# Patient Record
Sex: MALE | Race: BLACK OR AFRICAN AMERICAN | NOT HISPANIC OR LATINO | Employment: OTHER | URBAN - NONMETROPOLITAN AREA
[De-identification: names, ages, dates, MRNs, and addresses within clinical notes are randomized per-mention and may not be internally consistent; named-entity substitution may affect disease eponyms.]

---

## 2018-02-01 ENCOUNTER — TELEPHONE (OUTPATIENT)
Dept: NEUROLOGY | Facility: CLINIC | Age: 63
End: 2018-02-01

## 2018-02-01 NOTE — TELEPHONE ENCOUNTER
----- Message from Moises Estevez LPN sent at 2/1/2018 12:50 PM CST -----  Regarding: RE: MRI Denied  No, no phone number.   ----- Message -----     From: Cydney Hahn MA     Sent: 2/1/2018  12:40 PM       To: Moises Estevez LPN  Subject: RE: MRI Denied                                   DID HE LEAVE A PHONE NUMBER? ONES IN HIS CHART ARE NOT WORKING AND HE HAS NOT BEEN SEEN HERE BEFORE.  ----- Message -----     From: Moises Estevez LPN     Sent: 2/1/2018  11:44 AM       To: Cydney Hahn MA  Subject: MRI Denied                                       His insurance denied his MRI. What does he do next? Please call him.

## 2020-09-01 ENCOUNTER — TRANSCRIBE ORDERS (OUTPATIENT)
Dept: ORTHOPEDIC SURGERY | Facility: CLINIC | Age: 65
End: 2020-09-01

## 2020-09-01 DIAGNOSIS — M25.562 ACUTE BILATERAL KNEE PAIN: Primary | ICD-10-CM

## 2020-09-01 DIAGNOSIS — M25.561 ACUTE BILATERAL KNEE PAIN: Primary | ICD-10-CM

## 2020-09-09 DIAGNOSIS — M25.562 ACUTE PAIN OF BOTH KNEES: Primary | ICD-10-CM

## 2020-09-09 DIAGNOSIS — M25.561 ACUTE PAIN OF BOTH KNEES: Primary | ICD-10-CM

## 2020-09-10 ENCOUNTER — OFFICE VISIT (OUTPATIENT)
Dept: ORTHOPEDIC SURGERY | Facility: CLINIC | Age: 65
End: 2020-09-10

## 2020-09-10 VITALS — HEIGHT: 72 IN | BODY MASS INDEX: 35.76 KG/M2 | WEIGHT: 264 LBS

## 2020-09-10 DIAGNOSIS — E11.9 TYPE 2 DIABETES MELLITUS WITHOUT COMPLICATION, WITHOUT LONG-TERM CURRENT USE OF INSULIN (HCC): ICD-10-CM

## 2020-09-10 DIAGNOSIS — M23.92 INTERNAL DERANGEMENT OF LEFT KNEE: Primary | ICD-10-CM

## 2020-09-10 DIAGNOSIS — M25.561 ACUTE PAIN OF BOTH KNEES: ICD-10-CM

## 2020-09-10 DIAGNOSIS — Z96.651 S/P TKR (TOTAL KNEE REPLACEMENT), RIGHT: ICD-10-CM

## 2020-09-10 DIAGNOSIS — M25.562 ACUTE PAIN OF BOTH KNEES: ICD-10-CM

## 2020-09-10 PROCEDURE — 99203 OFFICE O/P NEW LOW 30 MIN: CPT | Performed by: ORTHOPAEDIC SURGERY

## 2020-09-10 RX ORDER — TAMSULOSIN HYDROCHLORIDE 0.4 MG/1
0.4 CAPSULE ORAL DAILY
Status: ON HOLD | COMMUNITY
Start: 2020-08-27 | End: 2022-01-29 | Stop reason: SDUPTHER

## 2020-09-10 RX ORDER — FUROSEMIDE 20 MG/1
20 TABLET ORAL EVERY OTHER DAY
COMMUNITY
Start: 2020-08-27 | End: 2022-01-29 | Stop reason: HOSPADM

## 2020-09-10 RX ORDER — LOVASTATIN 10 MG/1
TABLET ORAL
COMMUNITY
Start: 2020-08-27 | End: 2021-08-10 | Stop reason: DRUGHIGH

## 2020-09-10 RX ORDER — GUAIFENESIN 600 MG/1
1200 TABLET, EXTENDED RELEASE ORAL 2 TIMES DAILY
COMMUNITY
End: 2021-08-10 | Stop reason: DRUGHIGH

## 2020-09-10 RX ORDER — ASPIRIN 81 MG/1
81 TABLET, CHEWABLE ORAL DAILY
Status: ON HOLD | COMMUNITY
End: 2022-01-29 | Stop reason: SDUPTHER

## 2020-09-10 NOTE — PROGRESS NOTES
Scott Scanlon is a 64 y.o. male   Primary provider:  Shefali Rowland MD       Chief Complaint   Patient presents with   • Right Knee - Pain   • Left Knee - Pain       HISTORY OF PRESENT ILLNESS: Patient presents here today for bilateral knee pain.   No new injury in either knee.  Falling regularly due to pain in both knees.  Has gotten a hand hold to use when getting out of bed and going to the bathroom.  Describes severe pain in right knee at times.  No giving way.  No numbness or tingling  No fever or chills.    Occasional sharp pain in left, occasional popping.   Cannot pivot or twist due to pain.  Difficulty getting up from seated position.    He had arthroscopy of the left knee in 2014 and a total knee arthroplasty on the right knee in 2013.    Knee Pain    The incident occurred more than 1 week ago (3 years). The pain is present in the left knee and right knee. The quality of the pain is described as aching. The pain is severe. The pain has been constant since onset. The symptoms are aggravated by movement and weight bearing. The treatment provided no relief.        CONCURRENT MEDICAL HISTORY:    History reviewed. No pertinent past medical history.    Allergies   Allergen Reactions   • Celecoxib Confusion   • Penicillins Confusion         Current Outpatient Medications:   •  aspirin 81 MG chewable tablet, Chew 81 mg Daily., Disp: , Rfl:   •  Cholecalciferol (Dialyvite Vitamin D3 Max) 1.25 MG (65401 UT) tablet, Take 1 tablet by mouth., Disp: , Rfl:   •  furosemide (LASIX) 20 MG tablet, Take 1 tablet every other day., Disp: , Rfl:   •  guaiFENesin (MUCINEX) 600 MG 12 hr tablet, Take 1,200 mg by mouth 2 (Two) Times a Day., Disp: , Rfl:   •  lovastatin (MEVACOR) 10 MG tablet, , Disp: , Rfl:   •  metFORMIN (GLUCOPHAGE) 500 MG tablet, , Disp: , Rfl:   •  tamsulosin (FLOMAX) 0.4 MG capsule 24 hr capsule, Take 0.4 mg by mouth Daily., Disp: , Rfl:     Past Surgical History:   Procedure Laterality Date  "  • KNEE SURGERY         Family History   Problem Relation Age of Onset   • Cancer Mother    • Hypertension Mother    • Hypertension Father        Social History     Socioeconomic History   • Marital status:      Spouse name: Not on file   • Number of children: Not on file   • Years of education: Not on file   • Highest education level: Not on file   Tobacco Use   • Smoking status: Current Some Day Smoker   • Smokeless tobacco: Never Used   Substance and Sexual Activity   • Alcohol use: Yes   • Drug use: Never   • Sexual activity: Defer        Review of Systems   Constitutional: Negative for chills and fever.   Respiratory: Negative.    Cardiovascular: Negative.    Musculoskeletal: Positive for joint swelling.        Joint pain   Stiffness    All other systems reviewed and are negative.      PHYSICAL EXAMINATION:       Ht 182.9 cm (72\")   Wt 120 kg (264 lb)   BMI 35.80 kg/m²     Physical Exam   Constitutional: He is oriented to person, place, and time. He appears well-developed and well-nourished.   Neurological: He is alert and oriented to person, place, and time.   Psychiatric: He has a normal mood and affect. His behavior is normal. Judgment and thought content normal.       GAIT:     []  Normal  [x]  Antalgic    Assistive device: [x]  None  []  Walker     []  Crutches  []  Cane     []  Wheelchair  []  Stretcher    Right Knee Exam     Muscle Strength   The patient has normal right knee strength.    Tenderness   Right knee tenderness location: mild diffuse tenderness.    Range of Motion   Extension: 0   Flexion: 110     Tests   Varus: negative Valgus: negative  Drawer:  Anterior - negative        Other   Erythema: absent  Scars: present  Sensation: normal  Pulse: present  Swelling: mild      Left Knee Exam     Muscle Strength   The patient has normal left knee strength.    Tenderness   The patient is experiencing tenderness in the medial joint line and medial retinaculum.    Range of Motion   Extension: " 0   Flexion: 120     Tests   Ezra:  Medial - positive   Varus: negative Valgus: negative  Drawer:  Anterior - negative         Other   Erythema: absent  Sensation: normal  Pulse: present  Swelling: mild                  Xr Knee Bilateral Ap Standing    Result Date: 9/10/2020  Narrative: Ordering Provider:  Ben Cooper MD Ordering Diagnosis/Indication:  Acute pain of both knees Procedure:  XR KNEE BILATERAL AP STANDING Exam Date:  9/10/20 COMPARISON:  Todays X-rays were compared to previous images dated October 15, 2013.     Impression:  AP bilateral standing of the knees with lateral of both knees show acceptable position and alignment of a right total knee arthroplasty with no sign of implant loosening or failure.  Appropriate alignment is noted on both views.  Propria sizing is noted on both views as well.  There are some calcific changes in the patella, around the patella, and mild posterior osteophyte noted in comparison to prior x-ray.  The left knee shows mild to moderate arthritic changes with irregularity of the joint surface but maintenance of joint spacing.  The lateral view shows moderate to severe arthritic change in the patellofemoral compartment.  No acute findings noted. Ben Cooper MD 9/10/20     Xr Knee 1 Or 2 View Bilateral    Result Date: 9/10/2020  Narrative: Ordering Provider:  Ben Cooper MD Ordering Diagnosis/Indication:  Acute pain of both knees Procedure:  XR KNEE 1 OR 2 VW BILATERAL Exam Date:  9/10/20 COMPARISON:  Todays X-rays were compared to previous images dated October 15, 2013.     Impression:  AP bilateral standing of the knees with lateral of both knees show acceptable position and alignment of a right total knee arthroplasty with no sign of implant loosening or failure.  Appropriate alignment is noted on both views.  Propria sizing is noted on both views as well.  There are some calcific changes in the patella, around the patella, and mild  posterior osteophyte noted in comparison to prior x-ray.  The left knee shows mild to moderate arthritic changes with irregularity of the joint surface but maintenance of joint spacing.  The lateral view shows moderate to severe arthritic change in the patellofemoral compartment.  No acute findings noted. Ben Cooper MD 9/10/20           ASSESSMENT:    Diagnoses and all orders for this visit:    Internal derangement of left knee  -     Ambulatory Referral to Physical Therapy    Acute pain of both knees  -     Ambulatory Referral to Physical Therapy    S/P TKR (total knee replacement), right  -     Ambulatory Referral to Physical Therapy    Type 2 diabetes mellitus without complication, without long-term current use of insulin (CMS/[facility])  -     Ambulatory Referral to Physical Therapy    Other orders  -     metFORMIN (GLUCOPHAGE) 500 MG tablet  -     tamsulosin (FLOMAX) 0.4 MG capsule 24 hr capsule; Take 0.4 mg by mouth Daily.  -     Cholecalciferol (Dialyvite Vitamin D3 Max) 1.25 MG (50641 UT) tablet; Take 1 tablet by mouth.  -     lovastatin (MEVACOR) 10 MG tablet  -     furosemide (LASIX) 20 MG tablet; Take 1 tablet every other day.  -     aspirin 81 MG chewable tablet; Chew 81 mg Daily.  -     guaiFENesin (MUCINEX) 600 MG 12 hr tablet; Take 1,200 mg by mouth 2 (Two) Times a Day.          PLAN    PT:  Rom,STR bilateral knees  S/p TKA on right  Pain in both knees  Advance as tolerated.  Further workup on either or both depending on changes.    We discussed possible CT or bone scan on his right knee depending on how he responds to physical therapy.    We also discussed possible MRI of his left knee depending on how he responds to physical therapy and how his symptoms change.    Patient's Body mass index is 35.8 kg/m². BMI is above normal parameters. Recommendations include: exercise counseling and nutrition counseling.    Return in about 6 weeks (around 10/22/2020) for recheck.    Ben Cooper  MD

## 2020-09-16 ENCOUNTER — APPOINTMENT (OUTPATIENT)
Dept: PHYSICAL THERAPY | Facility: HOSPITAL | Age: 65
End: 2020-09-16

## 2020-10-29 ENCOUNTER — OFFICE VISIT (OUTPATIENT)
Dept: ORTHOPEDIC SURGERY | Facility: CLINIC | Age: 65
End: 2020-10-29

## 2020-10-29 VITALS — WEIGHT: 268.8 LBS | HEIGHT: 72 IN | BODY MASS INDEX: 36.41 KG/M2

## 2020-10-29 DIAGNOSIS — M25.562 ACUTE PAIN OF BOTH KNEES: Primary | ICD-10-CM

## 2020-10-29 DIAGNOSIS — M23.92 INTERNAL DERANGEMENT OF LEFT KNEE: ICD-10-CM

## 2020-10-29 DIAGNOSIS — E11.9 TYPE 2 DIABETES MELLITUS WITHOUT COMPLICATION, WITHOUT LONG-TERM CURRENT USE OF INSULIN (HCC): ICD-10-CM

## 2020-10-29 DIAGNOSIS — Z96.651 HISTORY OF ARTHROPLASTY OF RIGHT KNEE: ICD-10-CM

## 2020-10-29 DIAGNOSIS — Z96.651 S/P TKR (TOTAL KNEE REPLACEMENT), RIGHT: ICD-10-CM

## 2020-10-29 DIAGNOSIS — M25.561 ACUTE PAIN OF BOTH KNEES: Primary | ICD-10-CM

## 2020-10-29 PROCEDURE — 99213 OFFICE O/P EST LOW 20 MIN: CPT | Performed by: ORTHOPAEDIC SURGERY

## 2021-08-10 ENCOUNTER — OFFICE VISIT (OUTPATIENT)
Dept: SURGERY | Facility: CLINIC | Age: 66
End: 2021-08-10

## 2021-08-10 VITALS
TEMPERATURE: 96.9 F | BODY MASS INDEX: 34.67 KG/M2 | HEIGHT: 72 IN | SYSTOLIC BLOOD PRESSURE: 142 MMHG | DIASTOLIC BLOOD PRESSURE: 80 MMHG | WEIGHT: 256 LBS | HEART RATE: 75 BPM

## 2021-08-10 DIAGNOSIS — K80.10 CALCULUS OF GALLBLADDER WITH CHOLECYSTITIS WITHOUT BILIARY OBSTRUCTION, UNSPECIFIED CHOLECYSTITIS ACUITY: Primary | ICD-10-CM

## 2021-08-10 PROBLEM — M19.90 ARTHRITIS: Status: ACTIVE | Noted: 2021-08-10

## 2021-08-10 PROBLEM — M17.12 PRIMARY OSTEOARTHRITIS OF LEFT KNEE: Status: ACTIVE | Noted: 2017-07-17

## 2021-08-10 PROBLEM — T78.40XA ALLERGY: Status: ACTIVE | Noted: 2021-08-10

## 2021-08-10 PROBLEM — Z96.651 STATUS POST TOTAL RIGHT KNEE REPLACEMENT: Status: ACTIVE | Noted: 2017-07-17

## 2021-08-10 PROBLEM — E78.00 HYPERCHOLESTEROLEMIA: Status: ACTIVE | Noted: 2021-08-10

## 2021-08-10 PROBLEM — R33.9 URINARY RETENTION: Status: ACTIVE | Noted: 2021-08-10

## 2021-08-10 PROCEDURE — 99204 OFFICE O/P NEW MOD 45 MIN: CPT | Performed by: SURGERY

## 2021-08-10 RX ORDER — LOVASTATIN 40 MG/1
40 TABLET ORAL NIGHTLY
COMMUNITY
Start: 2021-07-16 | End: 2022-10-11 | Stop reason: SDUPTHER

## 2021-08-10 NOTE — PROGRESS NOTES
Subjective   Scott Scanlon is a 65 y.o. male     Chief Complaint: Cholelithiasis on CT scan    History of Present Illness recently underwent a screening CAT scan of his lungs and was noted to have small gallstones on study done at [facility].  Disc was provided but unable to really appreciate stones myself.  Limited views the upper abdomen were appreciated.  Patient has pain in his left lower back.  This pain seems to be associated with certain types of movement.  Pain is pretty much constant but can be improved with certain movements or can be made worse with certain movements.  No GI complaints no history of nausea or vomiting no history of jaundice.  LFTs do suggest that he is got a mildly elevated alkaline phosphatase and transaminases with normal bilirubin he specifically denies any abdominal pain or any problems with eating.  No foods that he avoids.    Review of Systems   Constitutional: Negative.    HENT: Negative.    Eyes: Negative.    Respiratory: Positive for cough.    Cardiovascular: Negative.    Gastrointestinal: Negative.    Endocrine: Negative.    Genitourinary: Positive for frequency.   Musculoskeletal: Positive for arthralgias and back pain.        Lt.Flank Pain   Skin: Negative.    Allergic/Immunologic: Negative.    Neurological: Negative.    Hematological: Negative.    Psychiatric/Behavioral: Negative.      History reviewed. No pertinent past medical history.  Past Surgical History:   Procedure Laterality Date   • KNEE SURGERY       Family History   Problem Relation Age of Onset   • Cancer Mother    • Hypertension Mother    • Hypertension Father      Social History     Socioeconomic History   • Marital status:      Spouse name: Not on file   • Number of children: Not on file   • Years of education: Not on file   • Highest education level: Not on file   Tobacco Use   • Smoking status: Current Some Day Smoker     Types: Cigarettes   • Smokeless tobacco: Never Used   • Tobacco comment:  one pack last 1.5 weeks   Substance and Sexual Activity   • Alcohol use: Yes     Comment: occassional    • Drug use: Never   • Sexual activity: Defer     Allergies   Allergen Reactions   • Celecoxib Confusion   • Corticosteroids Other (See Comments)     Pt states caused convulsions   • Penicillins Confusion     Vitals:    08/10/21 0831   BP: 142/80   Pulse: 75   Temp: 96.9 °F (36.1 °C)       Home Medications:  Prior to Admission medications    Medication Sig Start Date End Date Taking? Authorizing Provider   aspirin 81 MG chewable tablet Chew 81 mg Daily.   Yes Glory Ramos MD   Cholecalciferol (Dialyvite Vitamin D3 Max) 1.25 MG (00732 UT) tablet Take 1 tablet by mouth. 8/27/20  Yes Glory Ramos MD   furosemide (LASIX) 20 MG tablet Take 1 tablet every other day. 8/27/20  Yes Glory Ramos MD   lovastatin (MEVACOR) 40 MG tablet  7/16/21  Yes Glory Ramos MD   metFORMIN (GLUCOPHAGE) 1000 MG tablet Take 1,000 mg by mouth. 7/29/21  Yes Glory Ramos MD   tamsulosin (FLOMAX) 0.4 MG capsule 24 hr capsule Take 0.4 mg by mouth Daily. 8/27/20  Yes Glory Ramos MD   guaiFENesin (MUCINEX) 600 MG 12 hr tablet Take 1,200 mg by mouth 2 (Two) Times a Day.  8/10/21  Glory Ramos MD   lovastatin (MEVACOR) 10 MG tablet  8/27/20 8/10/21  Glory Ramos MD   metFORMIN (GLUCOPHAGE) 500 MG tablet  8/27/20 8/10/21  Glory Ramos MD       Objective   Physical Exam  Constitutional:       General: He is not in acute distress.     Appearance: He is well-developed.   HENT:      Head: Normocephalic and atraumatic.   Eyes:      General: No scleral icterus.     Conjunctiva/sclera: Conjunctivae normal.   Neck:      Thyroid: No thyromegaly.      Trachea: No tracheal deviation.   Cardiovascular:      Rate and Rhythm: Normal rate and regular rhythm.      Heart sounds: Normal heart sounds. No murmur heard.   No friction rub. No gallop.    Pulmonary:      Effort: Pulmonary  effort is normal. No respiratory distress.      Breath sounds: Normal breath sounds. No stridor. No wheezing or rales.   Chest:      Chest wall: No tenderness.   Abdominal:      General: Bowel sounds are normal. There is no distension.      Palpations: Abdomen is soft. There is no mass.      Tenderness: There is no abdominal tenderness. There is no guarding or rebound.      Hernia: No hernia is present.   Musculoskeletal:         General: No deformity. Normal range of motion.      Cervical back: Normal range of motion and neck supple.   Lymphadenopathy:      Cervical: No cervical adenopathy.   Skin:     General: Skin is warm and dry.      Coloration: Skin is not pale.      Findings: No erythema or rash.      Nails: There is no clubbing.   Neurological:      Mental Status: He is alert and oriented to person, place, and time.   Psychiatric:         Behavior: Behavior normal.         Thought Content: Thought content normal.         Assessment/Plan Cholelithiasis noted on screening study with elevated LFTs with a normal total bilirubin.  May be passing small stones causing LFTs to be elevated.  Discussed that with him.  Symptoms clearly do not correlate with any symptoms related to his biliary tree.  Am concerned about the elevated LFTs however will recommend that he undergo an ultrasound to better characterize his gallbladder and biliary tree.  Patient is tentatively set up to see Dr. Nicolas from GI but does not see him to the end of September.  Recommend patient go get and get ultrasound and follow-up with me after that and we can discuss further whether to do cholecystectomy on him or not.  He understands to follow-up sooner if he has any other concerns or questions      The encounter diagnosis was Calculus of gallbladder with cholecystitis without biliary obstruction, unspecified cholecystitis acuity.                     This document has been electronically signed by Campbell Flood MD on August 10, 2021 09:33  CDT

## 2021-08-16 ENCOUNTER — TELEPHONE (OUTPATIENT)
Dept: SURGERY | Facility: CLINIC | Age: 66
End: 2021-08-16

## 2021-08-16 ENCOUNTER — HOSPITAL ENCOUNTER (OUTPATIENT)
Dept: ULTRASOUND IMAGING | Facility: HOSPITAL | Age: 66
Discharge: HOME | End: 2021-08-16
Admitting: SURGERY

## 2021-08-16 DIAGNOSIS — K80.10 CALCULUS OF GALLBLADDER WITH CHOLECYSTITIS WITHOUT BILIARY OBSTRUCTION, UNSPECIFIED CHOLECYSTITIS ACUITY: ICD-10-CM

## 2021-08-16 PROCEDURE — 76705 ECHO EXAM OF ABDOMEN: CPT

## 2021-08-16 NOTE — TELEPHONE ENCOUNTER
Hospital lab called needing to see if patient is needing labs. He arrived there this morning.  did not see any lab orders.

## 2021-08-18 ENCOUNTER — OFFICE VISIT (OUTPATIENT)
Dept: SURGERY | Facility: CLINIC | Age: 66
End: 2021-08-18

## 2021-08-18 VITALS
SYSTOLIC BLOOD PRESSURE: 118 MMHG | HEART RATE: 71 BPM | HEIGHT: 72 IN | BODY MASS INDEX: 35.16 KG/M2 | TEMPERATURE: 97.5 F | DIASTOLIC BLOOD PRESSURE: 78 MMHG | WEIGHT: 259.6 LBS

## 2021-08-18 DIAGNOSIS — K80.20 CALCULUS OF GALLBLADDER WITHOUT CHOLECYSTITIS WITHOUT OBSTRUCTION: Primary | ICD-10-CM

## 2021-08-18 PROCEDURE — 99213 OFFICE O/P EST LOW 20 MIN: CPT | Performed by: SURGERY

## 2021-08-18 NOTE — PATIENT INSTRUCTIONS
"BMI for Adults  What is BMI?  Body mass index (BMI) is a number that is calculated from a person's weight and height. BMI can help estimate how much of a person's weight is composed of fat. BMI does not measure body fat directly. Rather, it is an alternative to procedures that directly measure body fat, which can be difficult and expensive.  BMI can help identify people who may be at higher risk for certain medical problems.  What are BMI measurements used for?  BMI is used as a screening tool to identify possible weight problems. It helps determine whether a person is obese, overweight, a healthy weight, or underweight.  BMI is useful for:  · Identifying a weight problem that may be related to a medical condition or may increase the risk for medical problems.  · Promoting changes, such as changes in diet and exercise, to help reach a healthy weight. BMI screening can be repeated to see if these changes are working.  How is BMI calculated?  BMI involves measuring your weight in relation to your height. Both height and weight are measured, and the BMI is calculated from those numbers. This can be done either in English ([place] or metric measurements. Note that charts and online BMI calculators are available to help you find your BMI quickly and easily without having to do these calculations yourself.  To calculate your BMI in English ([place] measurements:    1. Measure your weight in pounds (lb).  2. Multiply the number of pounds by 703.  ? For example, for a person who weighs 180 lb, multiply that number by 703, which equals 126,540.  3. Measure your height in inches. Then multiply that number by itself to get a measurement called \"inches squared.\"  ? For example, for a person who is 70 inches tall, the \"inches squared\" measurement is 70 inches x 70 inches, which equals 4,900 inches squared.  4. Divide the total from step 2 (number of lb x 703) by the total from step 3 (inches squared): 126,540 ÷ 4,900 = 25.8. This is " "your BMI.  To calculate your BMI in metric measurements:  1. Measure your weight in kilograms (kg).  2. Measure your height in meters (m). Then multiply that number by itself to get a measurement called \"meters squared.\"  ? For example, for a person who is 1.75 m tall, the \"meters squared\" measurement is 1.75 m x 1.75 m, which is equal to 3.1 meters squared.  3. Divide the number of kilograms (your weight) by the meters squared number. In this example: 70 ÷ 3.1 = 22.6. This is your BMI.  What do the results mean?  BMI charts are used to identify whether you are underweight, normal weight, overweight, or obese. The following guidelines will be used:  · Underweight: BMI less than 18.5.  · Normal weight: BMI between 18.5 and 24.9.  · Overweight: BMI between 25 and 29.9.  · Obese: BMI of 30 or above.  Keep these notes in mind:  · Weight includes both fat and muscle, so someone with a muscular build, such as an athlete, may have a BMI that is higher than 24.9. In cases like these, BMI is not an accurate measure of body fat.  · To determine if excess body fat is the cause of a BMI of 25 or higher, further assessments may need to be done by a health care provider.  · BMI is usually interpreted in the same way for men and women.  Where to find more information  For more information about BMI, including tools to quickly calculate your BMI, go to these websites:  · Centers for Disease Control and Prevention: www.cdc.gov  · American Heart Association: www.heart.org  · National Heart, Lung, and [employer] [place]: www.nhlbi.nih.gov  Summary  · Body mass index (BMI) is a number that is calculated from a person's weight and height.  · BMI may help estimate how much of a person's weight is composed of fat. BMI can help identify those who may be at higher risk for certain medical problems.  · BMI can be measured using English measurements or metric measurements.  · BMI charts are used to identify whether you are underweight, normal " weight, overweight, or obese.  This information is not intended to replace advice given to you by your health care provider. Make sure you discuss any questions you have with your health care provider.  Document Revised: 09/09/2020 Document Reviewed: 07/17/2020  Elsevier Patient Education © 2021 [employer]

## 2021-08-18 NOTE — PROGRESS NOTES
See previous notes.     Alert appropriate nontoxic  Abdomen soft  Nonicteric    Reviewed gallbladder ultrasound films     patient returns after having gallbladder ultrasound which confirms he does have gallstones..  We will thickness the gallbladder wall.  Patient continues to have left lower back pain and is mainly with certain movements.  Continues to not have any GI complaints and clearly no right upper quadrant or epigastric abdominal pain.  And no symptoms with eating.  I reviewed his previous labs and turns out he had a mildly elevated alkaline phosphatase and mildly elevated transaminases going back at least 2 maybe 3 years.  Hepatitis panel done in 2018 because of this was negative.  Talk to him about this suspect that he is actually asymptomatic from his gallstones.  As this was noted on a screening CT scan of his lungs.  I have gone over with him gallbladder disease and gave him a pamphlet and he has been reading that.  He understands if he has any symptoms related to his gallbladder he will follow-up with us at that time and we can talk about cholecystectomy.  He does not wish to proceed with surgery at this time and I think that is a reasonable choice.  He will follow-up with us sooner if he has any other concerns or questions

## 2021-11-29 ENCOUNTER — PREP FOR SURGERY (OUTPATIENT)
Dept: OTHER | Facility: HOSPITAL | Age: 66
End: 2021-11-29

## 2021-11-29 DIAGNOSIS — Z86.010 PERSONAL HISTORY OF COLONIC POLYPS: Primary | ICD-10-CM

## 2021-11-29 RX ORDER — DEXTROSE AND SODIUM CHLORIDE 5; .45 G/100ML; G/100ML
30 INJECTION, SOLUTION INTRAVENOUS CONTINUOUS PRN
Status: CANCELLED | OUTPATIENT
Start: 2022-01-10

## 2022-01-10 ENCOUNTER — ANESTHESIA EVENT (OUTPATIENT)
Dept: GASTROENTEROLOGY | Facility: HOSPITAL | Age: 67
End: 2022-01-10

## 2022-01-10 ENCOUNTER — HOSPITAL ENCOUNTER (OUTPATIENT)
Facility: HOSPITAL | Age: 67
Setting detail: HOSPITAL OUTPATIENT SURGERY
Discharge: HOME | End: 2022-01-10
Attending: INTERNAL MEDICINE | Admitting: INTERNAL MEDICINE

## 2022-01-10 ENCOUNTER — ANESTHESIA (OUTPATIENT)
Dept: GASTROENTEROLOGY | Facility: HOSPITAL | Age: 67
End: 2022-01-10

## 2022-01-10 VITALS
HEIGHT: 72 IN | HEART RATE: 60 BPM | WEIGHT: 257 LBS | SYSTOLIC BLOOD PRESSURE: 139 MMHG | DIASTOLIC BLOOD PRESSURE: 78 MMHG | BODY MASS INDEX: 34.81 KG/M2 | TEMPERATURE: 97.5 F | OXYGEN SATURATION: 98 % | RESPIRATION RATE: 20 BRPM

## 2022-01-10 DIAGNOSIS — Z86.010 PERSONAL HISTORY OF COLONIC POLYPS: ICD-10-CM

## 2022-01-10 PROCEDURE — 25010000002 PROPOFOL 10 MG/ML EMULSION: Performed by: NURSE ANESTHETIST, CERTIFIED REGISTERED

## 2022-01-10 PROCEDURE — 88305 TISSUE EXAM BY PATHOLOGIST: CPT

## 2022-01-10 PROCEDURE — C1889 IMPLANT/INSERT DEVICE, NOC: HCPCS | Performed by: INTERNAL MEDICINE

## 2022-01-10 DEVICE — DEV CLIP ENDO RESOLUTION360 CONTRL ROT 235CM: Type: IMPLANTABLE DEVICE | Site: COLON | Status: FUNCTIONAL

## 2022-01-10 RX ORDER — PROPOFOL 10 MG/ML
VIAL (ML) INTRAVENOUS AS NEEDED
Status: DISCONTINUED | OUTPATIENT
Start: 2022-01-10 | End: 2022-01-10 | Stop reason: SURG

## 2022-01-10 RX ORDER — DEXTROSE AND SODIUM CHLORIDE 5; .45 G/100ML; G/100ML
30 INJECTION, SOLUTION INTRAVENOUS CONTINUOUS PRN
Status: DISCONTINUED | OUTPATIENT
Start: 2022-01-10 | End: 2022-01-10 | Stop reason: HOSPADM

## 2022-01-10 RX ORDER — ONDANSETRON 2 MG/ML
4 INJECTION INTRAMUSCULAR; INTRAVENOUS ONCE AS NEEDED
Status: DISCONTINUED | OUTPATIENT
Start: 2022-01-10 | End: 2022-01-10 | Stop reason: HOSPADM

## 2022-01-10 RX ORDER — LIDOCAINE HYDROCHLORIDE 20 MG/ML
INJECTION, SOLUTION INTRAVENOUS AS NEEDED
Status: DISCONTINUED | OUTPATIENT
Start: 2022-01-10 | End: 2022-01-10 | Stop reason: SURG

## 2022-01-10 RX ADMIN — PROPOFOL 100 MG: 10 INJECTION, EMULSION INTRAVENOUS at 15:00

## 2022-01-10 RX ADMIN — DEXTROSE AND SODIUM CHLORIDE 30 ML/HR: 5; 450 INJECTION, SOLUTION INTRAVENOUS at 14:33

## 2022-01-10 RX ADMIN — PROPOFOL 20 MG: 10 INJECTION, EMULSION INTRAVENOUS at 15:03

## 2022-01-10 RX ADMIN — PROPOFOL 20 MG: 10 INJECTION, EMULSION INTRAVENOUS at 15:02

## 2022-01-10 RX ADMIN — PROPOFOL 20 MG: 10 INJECTION, EMULSION INTRAVENOUS at 15:10

## 2022-01-10 RX ADMIN — LIDOCAINE HYDROCHLORIDE 50 MG: 20 INJECTION, SOLUTION INTRAVENOUS at 15:00

## 2022-01-10 RX ADMIN — PROPOFOL 20 MG: 10 INJECTION, EMULSION INTRAVENOUS at 15:01

## 2022-01-10 RX ADMIN — PROPOFOL 20 MG: 10 INJECTION, EMULSION INTRAVENOUS at 15:08

## 2022-01-10 RX ADMIN — PROPOFOL 20 MG: 10 INJECTION, EMULSION INTRAVENOUS at 15:06

## 2022-01-10 RX ADMIN — PROPOFOL 20 MG: 10 INJECTION, EMULSION INTRAVENOUS at 15:04

## 2022-01-10 NOTE — ANESTHESIA POSTPROCEDURE EVALUATION
Patient: Scott Scanlon    Procedure Summary     Date: 01/10/22 Room / Location: [facility] ENDOSCOPY 2 / [facility] ENDOSCOPY    Anesthesia Start: 1459 Anesthesia Stop: 1520    Procedure: COLONOSCOPY (N/A ) Diagnosis:       Personal history of colonic polyps      (Personal history of colonic polyps [Z86.010])    Surgeons: Jose Alfredo Nicolas DO Provider: Bryan Long CRNA    Anesthesia Type: MAC ASA Status: 3          Anesthesia Type: MAC    Vitals  No vitals data found for the desired time range.          Post Anesthesia Care and Evaluation    Patient location during evaluation: PACU  Level of consciousness: sleepy but conscious  Pain score: 0  Pain management: adequate  Airway patency: patent  Anesthetic complications: No anesthetic complications  PONV Status: none  Cardiovascular status: acceptable and hemodynamically stable  Respiratory status: acceptable and spontaneous ventilation  Hydration status: acceptable

## 2022-01-10 NOTE — H&P
Dax Otoole DO,[facility]  Gastroenterology  Hepatology  Endoscopy  Board Certified in Internal Medicine and gastroenterology  [address], [address]  [place]. [address]  - [phone]   F - [phone]     GASTROENTEROLOGY HISTORY AND PHYSICAL  NOTE   DAX OTOOLE DO.         SUBJECTIVE:   1/10/2022    Name: Scott Scanlon  DOD: 1955      Chief Complaint:     Subjective : Personal history of colon polyps    Patient is 66 y.o. male presents with desire for elective colonoscopy.      ROS/HISTORY/ CURRENT MEDICATIONS/OBJECTIVE/VS/PE:   Review of Systems:  All systems unremarkable unless specified below.  Constitutional   HENT  Eyes   Respiratory    Cardiovascular  Gastrointestinal   Endocrine  Genitourinary    Musculoskeletal   Skin  Allergic/Immunologic    Neurological    Hematological  Psychiatric/Behavioral    History:     Past Medical History:   Diagnosis Date   • Diabetes mellitus (HCC)    • Hyperlipidemia      Past Surgical History:   Procedure Laterality Date   • JOINT REPLACEMENT     • KNEE ARTHROSCOPY Left    • TOTAL KNEE ARTHROPLASTY Right      Family History   Problem Relation Age of Onset   • Cancer Mother    • Hypertension Mother    • Hypertension Father      Social History     Tobacco Use   • Smoking status: Light Tobacco Smoker     Types: Cigarettes   • Smokeless tobacco: Never Used   Vaping Use   • Vaping Use: Never used   Substance Use Topics   • Alcohol use: Yes     Comment: socially   • Drug use: Never     Prior to Admission medications    Medication Sig Start Date End Date Taking? Authorizing Provider   Cholecalciferol (Dialyvite Vitamin D3 Max) 1.25 MG (44288 UT) tablet Take 1 tablet by mouth 1 (One) Time Per Week. 8/27/20  Yes ProviderGlory MD   furosemide (LASIX) 20 MG tablet Take 20 mg by mouth Every Other Day. 8/27/20  Yes Glory Ramos MD   lovastatin (MEVACOR) 40 MG tablet Take 40 mg by mouth Every Night. 7/16/21  Yes ProviderGlory,  MD   metFORMIN (GLUCOPHAGE) 1000 MG tablet Take 1,000 mg by mouth Daily With Breakfast. 7/29/21  Yes Provider, MD Glory   tamsulosin (FLOMAX) 0.4 MG capsule 24 hr capsule Take 0.4 mg by mouth Daily. 8/27/20  Yes Provider, MD Glory   aspirin 81 MG chewable tablet Chew 81 mg Daily.    Provider, Glory, MD     Allergies:  Celecoxib, Corticosteroids, and Penicillins    I have reviewed the patients medical history, surgical history and family history in the available medical record system.     Current Medications:     Current Facility-Administered Medications   Medication Dose Route Frequency Provider Last Rate Last Admin   • dextrose 5 % and sodium chloride 0.45 % infusion  30 mL/hr Intravenous Continuous PRN oJse Alfredo Nicolas DO 30 mL/hr at 01/10/22 1433 30 mL/hr at 01/10/22 1433   • ondansetron (ZOFRAN) injection 4 mg  4 mg Intravenous Once PRN Bryan Long CRNA           Objective     Physical Exam:   Temp:  [97.3 °F (36.3 °C)] 97.3 °F (36.3 °C)  Heart Rate:  [67] 67  Resp:  [18] 18  BP: (161)/(78) 161/78    Physical Exam:  General Appearance:    Alert, cooperative, in no acute distress   Head:    Normocephalic, without obvious abnormality, atraumatic   Eyes:            Lids and lashes normal, conjunctivae and sclerae normal, no icterus, no pallor, corneas clear, PERRLA   Ears:    Ears appear intact with no abnormalities noted   Throat:   No oral lesions, no thrush, oral mucosa moist   Neck:   No adenopathy, supple, trachea midline, no thyromegaly, no  carotid bruit, no JVD   Back:     No kyphosis present, no scoliosis present, no skin lesions,   erythema or scars, no tenderness to percussion or                 palpation,  range of motion normal   Lungs:     Clear to auscultation,respirations regular, even and         unlabored    Heart:    Regular rhythm and normal rate, normal S1 and S2, no  murmur, no gallop, no rub, no click   Breast Exam:    Deferred   Abdomen:     Normal bowel sounds, no  masses, no organomegaly, soft  nontender, nondistended, no guarding, no rebound                 tenderness   Genitalia:    Deferred   Extremities:   Moves all extremities well, no edema, no cyanosis, no          redness   Pulses:   Pulses palpable and equal bilaterally   Skin:   No bleeding, bruising or rash   Lymph nodes:   No palpable adenopathy   Neurologic:   Cranial nerves 2 - 12 grossly intact, sensation intact, DTR     present and equal bilaterally      Results Review:     Lab Results   Component Value Date    WBC 5.5 10/17/2019    WBC 6.1 11/12/2018    WBC 7.0 10/30/2016    HGB 15.3 10/17/2019    HGB 16.1 11/12/2018    HGB 15.2 10/30/2016    HCT 48.1 10/17/2019    HCT 49.5 (H) 11/12/2018    HCT 44.6 10/30/2016     10/17/2019     11/12/2018     10/30/2016             No results found for: LIPASE  No results found for: INR  No results found for: CULTURE    Radiology Review:  Imaging Results (Last 72 Hours)     ** No results found for the last 72 hours. **           I reviewed the patient's new clinical results.  I reviewed the patient's new imaging results and agree with the interpretation.     ASSESSMENT/PLAN:   ASSESSMENT:  1.  Personal history of colon polyps    PLAN:  1.  Colonoscopy    Risk and benefits associated with the procedure are reviewed with the patient.  The patient wished to proceed     Jose Alfredo Nicolas DO  01/10/22  14:55 CST

## 2022-01-10 NOTE — ANESTHESIA PREPROCEDURE EVALUATION
Anesthesia Evaluation     NPO Solid Status: > 8 hours  NPO Liquid Status: > 2 hours           Airway   Mallampati: II  TM distance: >3 FB  Neck ROM: full  Dental      Pulmonary - normal exam   (+) a smoker Current,   Cardiovascular - normal exam    (+) hypertension (161/78), hyperlipidemia,       Neuro/Psych  GI/Hepatic/Renal/Endo    (+) obesity,   diabetes mellitus,     Musculoskeletal     Abdominal    Substance History   (+) alcohol use,      OB/GYN          Other                        Anesthesia Plan    ASA 3     MAC   total IV anesthesia  intravenous induction     Anesthetic plan, all risks, benefits, and alternatives have been provided, discussed and informed consent has been obtained with: patient.

## 2022-01-13 LAB
LAB AP CASE REPORT: NORMAL
PATH REPORT.FINAL DX SPEC: NORMAL

## 2022-01-24 ENCOUNTER — APPOINTMENT (OUTPATIENT)
Dept: GENERAL RADIOLOGY | Facility: HOSPITAL | Age: 67
End: 2022-01-24

## 2022-01-24 ENCOUNTER — HOSPITAL ENCOUNTER (INPATIENT)
Facility: HOSPITAL | Age: 67
LOS: 5 days | Discharge: HOME | End: 2022-01-29
Attending: FAMILY MEDICINE | Admitting: INTERNAL MEDICINE

## 2022-01-24 ENCOUNTER — APPOINTMENT (OUTPATIENT)
Dept: CT IMAGING | Facility: HOSPITAL | Age: 67
End: 2022-01-24

## 2022-01-24 DIAGNOSIS — I26.02 ACUTE SADDLE PULMONARY EMBOLISM WITH ACUTE COR PULMONALE: ICD-10-CM

## 2022-01-24 DIAGNOSIS — Z78.9 IMPAIRED MOBILITY AND ADLS: ICD-10-CM

## 2022-01-24 DIAGNOSIS — R07.2 PRECORDIAL PAIN: Primary | ICD-10-CM

## 2022-01-24 DIAGNOSIS — Z74.09 IMPAIRED FUNCTIONAL MOBILITY, BALANCE, GAIT, AND ENDURANCE: ICD-10-CM

## 2022-01-24 DIAGNOSIS — Z74.09 IMPAIRED MOBILITY AND ADLS: ICD-10-CM

## 2022-01-24 DIAGNOSIS — U07.1 COVID: ICD-10-CM

## 2022-01-24 PROBLEM — I26.99 ACUTE PULMONARY EMBOLISM (HCC): Status: ACTIVE | Noted: 2022-01-24

## 2022-01-24 PROBLEM — J12.82 PNEUMONIA DUE TO COVID-19 VIRUS: Status: ACTIVE | Noted: 2022-01-24

## 2022-01-24 PROBLEM — R07.9 CHEST PAIN: Status: ACTIVE | Noted: 2022-01-24

## 2022-01-24 PROBLEM — I26.92 ACUTE SADDLE PULMONARY EMBOLISM: Status: ACTIVE | Noted: 2022-01-24

## 2022-01-24 LAB
ALBUMIN SERPL-MCNC: 3.6 G/DL (ref 3.5–5.2)
ALBUMIN/GLOB SERPL: 0.8 G/DL
ALP SERPL-CCNC: 152 U/L (ref 39–117)
ALT SERPL W P-5'-P-CCNC: 90 U/L (ref 1–41)
ANION GAP SERPL CALCULATED.3IONS-SCNC: 11 MMOL/L (ref 5–15)
APTT PPP: 32.1 SECONDS (ref 20–40.3)
AST SERPL-CCNC: 117 U/L (ref 1–40)
BASOPHILS # BLD AUTO: 0.05 10*3/MM3 (ref 0–0.2)
BASOPHILS # BLD AUTO: 0.07 10*3/MM3 (ref 0–0.2)
BASOPHILS NFR BLD AUTO: 0.6 % (ref 0–1.5)
BASOPHILS NFR BLD AUTO: 1 % (ref 0–1.5)
BILIRUB SERPL-MCNC: 1.4 MG/DL (ref 0–1.2)
BUN SERPL-MCNC: 13 MG/DL (ref 8–23)
BUN/CREAT SERPL: 11.7 (ref 7–25)
CALCIUM SPEC-SCNC: 9.5 MG/DL (ref 8.6–10.5)
CHLORIDE SERPL-SCNC: 104 MMOL/L (ref 98–107)
CK SERPL-CCNC: 110 U/L (ref 20–200)
CO2 SERPL-SCNC: 27 MMOL/L (ref 22–29)
CREAT SERPL-MCNC: 1.11 MG/DL (ref 0.76–1.27)
D-DIMER, QUANTITATIVE (MAD,POW, STR): >4000 NG/ML (FEU) (ref 0–470)
DEPRECATED RDW RBC AUTO: 43.7 FL (ref 37–54)
DEPRECATED RDW RBC AUTO: 44.5 FL (ref 37–54)
EOSINOPHIL # BLD AUTO: 0.08 10*3/MM3 (ref 0–0.4)
EOSINOPHIL # BLD AUTO: 0.11 10*3/MM3 (ref 0–0.4)
EOSINOPHIL NFR BLD AUTO: 0.9 % (ref 0.3–6.2)
EOSINOPHIL NFR BLD AUTO: 1.5 % (ref 0.3–6.2)
ERYTHROCYTE [DISTWIDTH] IN BLOOD BY AUTOMATED COUNT: 12.1 % (ref 12.3–15.4)
ERYTHROCYTE [DISTWIDTH] IN BLOOD BY AUTOMATED COUNT: 12.2 % (ref 12.3–15.4)
FLUAV SUBTYP SPEC NAA+PROBE: NOT DETECTED
FLUBV RNA ISLT QL NAA+PROBE: NOT DETECTED
GFR SERPL CREATININE-BSD FRML MDRD: 81 ML/MIN/1.73
GLOBULIN UR ELPH-MCNC: 4.4 GM/DL
GLUCOSE BLDC GLUCOMTR-MCNC: 95 MG/DL (ref 70–130)
GLUCOSE SERPL-MCNC: 116 MG/DL (ref 65–99)
HCT VFR BLD AUTO: 42.6 % (ref 37.5–51)
HCT VFR BLD AUTO: 48.5 % (ref 37.5–51)
HGB BLD-MCNC: 14.1 G/DL (ref 13–17.7)
HGB BLD-MCNC: 15.8 G/DL (ref 13–17.7)
HOLD SPECIMEN: NORMAL
HOLD SPECIMEN: NORMAL
IMM GRANULOCYTES # BLD AUTO: 0.03 10*3/MM3 (ref 0–0.05)
IMM GRANULOCYTES # BLD AUTO: 0.04 10*3/MM3 (ref 0–0.05)
IMM GRANULOCYTES NFR BLD AUTO: 0.4 % (ref 0–0.5)
IMM GRANULOCYTES NFR BLD AUTO: 0.5 % (ref 0–0.5)
INR PPP: 1.23 (ref 0.8–1.2)
LYMPHOCYTES # BLD AUTO: 1.66 10*3/MM3 (ref 0.7–3.1)
LYMPHOCYTES # BLD AUTO: 1.8 10*3/MM3 (ref 0.7–3.1)
LYMPHOCYTES NFR BLD AUTO: 21 % (ref 19.6–45.3)
LYMPHOCYTES NFR BLD AUTO: 23.1 % (ref 19.6–45.3)
MAGNESIUM SERPL-MCNC: 2.1 MG/DL (ref 1.6–2.4)
MCH RBC QN AUTO: 32 PG (ref 26.6–33)
MCH RBC QN AUTO: 32.6 PG (ref 26.6–33)
MCHC RBC AUTO-ENTMCNC: 32.6 G/DL (ref 31.5–35.7)
MCHC RBC AUTO-ENTMCNC: 33.1 G/DL (ref 31.5–35.7)
MCV RBC AUTO: 98.4 FL (ref 79–97)
MCV RBC AUTO: 98.6 FL (ref 79–97)
MONOCYTES # BLD AUTO: 0.95 10*3/MM3 (ref 0.1–0.9)
MONOCYTES # BLD AUTO: 1.22 10*3/MM3 (ref 0.1–0.9)
MONOCYTES NFR BLD AUTO: 13.2 % (ref 5–12)
MONOCYTES NFR BLD AUTO: 14.2 % (ref 5–12)
NEUTROPHILS NFR BLD AUTO: 4.37 10*3/MM3 (ref 1.7–7)
NEUTROPHILS NFR BLD AUTO: 5.38 10*3/MM3 (ref 1.7–7)
NEUTROPHILS NFR BLD AUTO: 60.8 % (ref 42.7–76)
NEUTROPHILS NFR BLD AUTO: 62.8 % (ref 42.7–76)
NRBC BLD AUTO-RTO: 0 /100 WBC (ref 0–0.2)
NRBC BLD AUTO-RTO: 0 /100 WBC (ref 0–0.2)
NT-PROBNP SERPL-MCNC: 193.3 PG/ML (ref 0–900)
PLATELET # BLD AUTO: 235 10*3/MM3 (ref 140–450)
PLATELET # BLD AUTO: 249 10*3/MM3 (ref 140–450)
PMV BLD AUTO: 10 FL (ref 6–12)
PMV BLD AUTO: 9.7 FL (ref 6–12)
POTASSIUM SERPL-SCNC: 3.8 MMOL/L (ref 3.5–5.2)
PROCALCITONIN SERPL-MCNC: 0.24 NG/ML (ref 0–0.25)
PROT SERPL-MCNC: 8 G/DL (ref 6–8.5)
PROTHROMBIN TIME: 15.3 SECONDS (ref 11.1–15.3)
RBC # BLD AUTO: 4.32 10*6/MM3 (ref 4.14–5.8)
RBC # BLD AUTO: 4.93 10*6/MM3 (ref 4.14–5.8)
SARS-COV-2 RNA PNL SPEC NAA+PROBE: DETECTED
SODIUM SERPL-SCNC: 142 MMOL/L (ref 136–145)
TROPONIN T SERPL-MCNC: 0.07 NG/ML (ref 0–0.03)
WBC NRBC COR # BLD: 7.19 10*3/MM3 (ref 3.4–10.8)
WBC NRBC COR # BLD: 8.57 10*3/MM3 (ref 3.4–10.8)
WHOLE BLOOD HOLD SPECIMEN: NORMAL

## 2022-01-24 PROCEDURE — 85025 COMPLETE CBC W/AUTO DIFF WBC: CPT

## 2022-01-24 PROCEDURE — 80053 COMPREHEN METABOLIC PANEL: CPT

## 2022-01-24 PROCEDURE — 85610 PROTHROMBIN TIME: CPT | Performed by: FAMILY MEDICINE

## 2022-01-24 PROCEDURE — 83735 ASSAY OF MAGNESIUM: CPT | Performed by: FAMILY MEDICINE

## 2022-01-24 PROCEDURE — 85730 THROMBOPLASTIN TIME PARTIAL: CPT | Performed by: FAMILY MEDICINE

## 2022-01-24 PROCEDURE — 99285 EMERGENCY DEPT VISIT HI MDM: CPT

## 2022-01-24 PROCEDURE — 93010 ELECTROCARDIOGRAM REPORT: CPT | Performed by: INTERNAL MEDICINE

## 2022-01-24 PROCEDURE — 94760 N-INVAS EAR/PLS OXIMETRY 1: CPT

## 2022-01-24 PROCEDURE — 85025 COMPLETE CBC W/AUTO DIFF WBC: CPT | Performed by: FAMILY MEDICINE

## 2022-01-24 PROCEDURE — 85379 FIBRIN DEGRADATION QUANT: CPT | Performed by: FAMILY MEDICINE

## 2022-01-24 PROCEDURE — 36415 COLL VENOUS BLD VENIPUNCTURE: CPT | Performed by: INTERNAL MEDICINE

## 2022-01-24 PROCEDURE — 93005 ELECTROCARDIOGRAM TRACING: CPT | Performed by: FAMILY MEDICINE

## 2022-01-24 PROCEDURE — 82962 GLUCOSE BLOOD TEST: CPT

## 2022-01-24 PROCEDURE — 84484 ASSAY OF TROPONIN QUANT: CPT

## 2022-01-24 PROCEDURE — 87040 BLOOD CULTURE FOR BACTERIA: CPT | Performed by: INTERNAL MEDICINE

## 2022-01-24 PROCEDURE — 94799 UNLISTED PULMONARY SVC/PX: CPT

## 2022-01-24 PROCEDURE — 25010000002 DEXAMETHASONE PER 1 MG: Performed by: INTERNAL MEDICINE

## 2022-01-24 PROCEDURE — 84145 PROCALCITONIN (PCT): CPT | Performed by: FAMILY MEDICINE

## 2022-01-24 PROCEDURE — 85730 THROMBOPLASTIN TIME PARTIAL: CPT | Performed by: INTERNAL MEDICINE

## 2022-01-24 PROCEDURE — 82550 ASSAY OF CK (CPK): CPT | Performed by: FAMILY MEDICINE

## 2022-01-24 PROCEDURE — 71046 X-RAY EXAM CHEST 2 VIEWS: CPT

## 2022-01-24 PROCEDURE — 25010000002 HEPARIN (PORCINE) PER 1000 UNITS: Performed by: FAMILY MEDICINE

## 2022-01-24 PROCEDURE — 0 IOPAMIDOL PER 1 ML: Performed by: FAMILY MEDICINE

## 2022-01-24 PROCEDURE — XW033E5 INTRODUCTION OF REMDESIVIR ANTI-INFECTIVE INTO PERIPHERAL VEIN, PERCUTANEOUS APPROACH, NEW TECHNOLOGY GROUP 5: ICD-10-PCS | Performed by: INTERNAL MEDICINE

## 2022-01-24 PROCEDURE — 87636 SARSCOV2 & INF A&B AMP PRB: CPT | Performed by: FAMILY MEDICINE

## 2022-01-24 PROCEDURE — 83880 ASSAY OF NATRIURETIC PEPTIDE: CPT

## 2022-01-24 PROCEDURE — 71275 CT ANGIOGRAPHY CHEST: CPT

## 2022-01-24 RX ORDER — HEPARIN SODIUM 5000 [USP'U]/ML
80 INJECTION, SOLUTION INTRAVENOUS; SUBCUTANEOUS ONCE
Status: COMPLETED | OUTPATIENT
Start: 2022-01-24 | End: 2022-01-24

## 2022-01-24 RX ORDER — DEXTROSE MONOHYDRATE 25 G/50ML
25 INJECTION, SOLUTION INTRAVENOUS
Status: DISCONTINUED | OUTPATIENT
Start: 2022-01-24 | End: 2022-01-29 | Stop reason: HOSPADM

## 2022-01-24 RX ORDER — ASPIRIN 81 MG/1
81 TABLET, CHEWABLE ORAL DAILY
Status: DISCONTINUED | OUTPATIENT
Start: 2022-01-24 | End: 2022-01-29 | Stop reason: HOSPADM

## 2022-01-24 RX ORDER — HEPARIN SODIUM 5000 [USP'U]/ML
40 INJECTION, SOLUTION INTRAVENOUS; SUBCUTANEOUS AS NEEDED
Status: DISCONTINUED | OUTPATIENT
Start: 2022-01-24 | End: 2022-01-26

## 2022-01-24 RX ORDER — SODIUM CHLORIDE 0.9 % (FLUSH) 0.9 %
10 SYRINGE (ML) INJECTION EVERY 12 HOURS SCHEDULED
Status: DISCONTINUED | OUTPATIENT
Start: 2022-01-24 | End: 2022-01-29 | Stop reason: HOSPADM

## 2022-01-24 RX ORDER — SODIUM CHLORIDE 0.9 % (FLUSH) 0.9 %
10 SYRINGE (ML) INJECTION AS NEEDED
Status: DISCONTINUED | OUTPATIENT
Start: 2022-01-24 | End: 2022-01-29 | Stop reason: HOSPADM

## 2022-01-24 RX ORDER — NICOTINE POLACRILEX 4 MG
15 LOZENGE BUCCAL
Status: DISCONTINUED | OUTPATIENT
Start: 2022-01-24 | End: 2022-01-29 | Stop reason: HOSPADM

## 2022-01-24 RX ORDER — ALBUTEROL SULFATE 90 UG/1
2 AEROSOL, METERED RESPIRATORY (INHALATION)
Status: DISCONTINUED | OUTPATIENT
Start: 2022-01-24 | End: 2022-01-29 | Stop reason: HOSPADM

## 2022-01-24 RX ORDER — HEPARIN SODIUM 5000 [USP'U]/ML
80 INJECTION, SOLUTION INTRAVENOUS; SUBCUTANEOUS AS NEEDED
Status: DISCONTINUED | OUTPATIENT
Start: 2022-01-24 | End: 2022-01-26

## 2022-01-24 RX ORDER — HEPARIN SOD,PORCINE/0.9 % NACL 25000/250
15.4 INTRAVENOUS SOLUTION INTRAVENOUS
Status: DISCONTINUED | OUTPATIENT
Start: 2022-01-24 | End: 2022-01-26

## 2022-01-24 RX ORDER — TAMSULOSIN HYDROCHLORIDE 0.4 MG/1
0.4 CAPSULE ORAL DAILY
Status: DISCONTINUED | OUTPATIENT
Start: 2022-01-24 | End: 2022-01-29 | Stop reason: HOSPADM

## 2022-01-24 RX ORDER — DEXAMETHASONE SODIUM PHOSPHATE 4 MG/ML
6 INJECTION, SOLUTION INTRA-ARTICULAR; INTRALESIONAL; INTRAMUSCULAR; INTRAVENOUS; SOFT TISSUE EVERY 12 HOURS
Status: DISCONTINUED | OUTPATIENT
Start: 2022-01-24 | End: 2022-01-29 | Stop reason: HOSPADM

## 2022-01-24 RX ADMIN — HEPARIN SODIUM 13.4 UNITS/KG/HR: 10000 INJECTION, SOLUTION INTRAVENOUS; SUBCUTANEOUS at 17:18

## 2022-01-24 RX ADMIN — SODIUM CHLORIDE, PRESERVATIVE FREE 10 ML: 5 INJECTION INTRAVENOUS at 21:13

## 2022-01-24 RX ADMIN — ALBUTEROL SULFATE 2 PUFF: 90 AEROSOL, METERED RESPIRATORY (INHALATION) at 20:34

## 2022-01-24 RX ADMIN — DEXAMETHASONE SODIUM PHOSPHATE 6 MG: 4 INJECTION, SOLUTION INTRA-ARTICULAR; INTRALESIONAL; INTRAMUSCULAR; INTRAVENOUS; SOFT TISSUE at 22:50

## 2022-01-24 RX ADMIN — ASPIRIN 81 MG: 81 TABLET, CHEWABLE ORAL at 18:46

## 2022-01-24 RX ADMIN — HEPARIN SODIUM 9000 UNITS: 5000 INJECTION, SOLUTION INTRAVENOUS; SUBCUTANEOUS at 17:18

## 2022-01-24 RX ADMIN — TAMSULOSIN HYDROCHLORIDE 0.4 MG: 0.4 CAPSULE ORAL at 18:46

## 2022-01-24 RX ADMIN — IOPAMIDOL 70 ML: 755 INJECTION, SOLUTION INTRAVENOUS at 15:40

## 2022-01-24 RX ADMIN — IPRATROPIUM BROMIDE 2 PUFF: 17 AEROSOL, METERED RESPIRATORY (INHALATION) at 20:34

## 2022-01-25 ENCOUNTER — APPOINTMENT (OUTPATIENT)
Dept: ULTRASOUND IMAGING | Facility: HOSPITAL | Age: 67
End: 2022-01-25

## 2022-01-25 ENCOUNTER — APPOINTMENT (OUTPATIENT)
Dept: CARDIOLOGY | Facility: HOSPITAL | Age: 67
End: 2022-01-25

## 2022-01-25 LAB
ANION GAP SERPL CALCULATED.3IONS-SCNC: 12 MMOL/L (ref 5–15)
APTT PPP: 119.5 SECONDS (ref 20–40.3)
APTT PPP: 42 SECONDS (ref 20–40.3)
APTT PPP: 55.9 SECONDS (ref 20–40.3)
APTT PPP: 58.9 SECONDS (ref 20–40.3)
APTT PPP: 92.2 SECONDS (ref 20–40.3)
BASOPHILS # BLD AUTO: 0.02 10*3/MM3 (ref 0–0.2)
BASOPHILS NFR BLD AUTO: 0.3 % (ref 0–1.5)
BH CV ECHO MEAS - AO MAX PG (FULL): 2.7 MMHG
BH CV ECHO MEAS - AO MAX PG: 8.4 MMHG
BH CV ECHO MEAS - AO MEAN PG (FULL): 2 MMHG
BH CV ECHO MEAS - AO MEAN PG: 5 MMHG
BH CV ECHO MEAS - AO V2 MAX: 145 CM/SEC
BH CV ECHO MEAS - AO V2 MEAN: 100 CM/SEC
BH CV ECHO MEAS - AO V2 VTI: 25.9 CM
BH CV ECHO MEAS - AVA(I,A): 3.8 CM^2
BH CV ECHO MEAS - AVA(I,D): 3.8 CM^2
BH CV ECHO MEAS - AVA(V,A): 3.4 CM^2
BH CV ECHO MEAS - AVA(V,D): 3.4 CM^2
BH CV ECHO MEAS - BSA(HAYCOCK): 2.4 M^2
BH CV ECHO MEAS - BSA: 2.3 M^2
BH CV ECHO MEAS - BZI_BMI: 33.4 KILOGRAMS/M^2
BH CV ECHO MEAS - BZI_METRIC_HEIGHT: 182.9 CM
BH CV ECHO MEAS - BZI_METRIC_WEIGHT: 111.6 KG
BH CV ECHO MEAS - EDV(CUBED): 101.2 ML
BH CV ECHO MEAS - EDV(MOD-SP2): 46.3 ML
BH CV ECHO MEAS - EDV(MOD-SP4): 79.4 ML
BH CV ECHO MEAS - EDV(TEICH): 100.3 ML
BH CV ECHO MEAS - EF(CUBED): 70.3 %
BH CV ECHO MEAS - EF(MOD-SP2): 68.5 %
BH CV ECHO MEAS - EF(MOD-SP4): 52.6 %
BH CV ECHO MEAS - EF(TEICH): 61.9 %
BH CV ECHO MEAS - ESV(CUBED): 30.1 ML
BH CV ECHO MEAS - ESV(MOD-SP2): 14.6 ML
BH CV ECHO MEAS - ESV(MOD-SP4): 37.6 ML
BH CV ECHO MEAS - ESV(TEICH): 38.2 ML
BH CV ECHO MEAS - FS: 33.3 %
BH CV ECHO MEAS - IVS/LVPW: 0.89
BH CV ECHO MEAS - IVSD: 1.1 CM
BH CV ECHO MEAS - LA DIMENSION: 4.2 CM
BH CV ECHO MEAS - LV DIASTOLIC VOL/BSA (35-75): 34.1 ML/M^2
BH CV ECHO MEAS - LV MASS(C)D: 200.6 GRAMS
BH CV ECHO MEAS - LV MASS(C)DI: 86.2 GRAMS/M^2
BH CV ECHO MEAS - LV MAX PG: 5.8 MMHG
BH CV ECHO MEAS - LV MEAN PG: 3 MMHG
BH CV ECHO MEAS - LV SYSTOLIC VOL/BSA (12-30): 16.2 ML/M^2
BH CV ECHO MEAS - LV V1 MAX: 120 CM/SEC
BH CV ECHO MEAS - LV V1 MEAN: 76.4 CM/SEC
BH CV ECHO MEAS - LV V1 VTI: 23.5 CM
BH CV ECHO MEAS - LVIDD: 4.7 CM
BH CV ECHO MEAS - LVIDS: 3.1 CM
BH CV ECHO MEAS - LVLD AP2: 7.9 CM
BH CV ECHO MEAS - LVLD AP4: 7.9 CM
BH CV ECHO MEAS - LVLS AP2: 6.5 CM
BH CV ECHO MEAS - LVLS AP4: 6.6 CM
BH CV ECHO MEAS - LVOT AREA (M): 4.2 CM^2
BH CV ECHO MEAS - LVOT AREA: 4.2 CM^2
BH CV ECHO MEAS - LVOT DIAM: 2.3 CM
BH CV ECHO MEAS - LVPWD: 1.2 CM
BH CV ECHO MEAS - MV A MAX VEL: 55.2 CM/SEC
BH CV ECHO MEAS - MV DEC SLOPE: 233 CM/SEC^2
BH CV ECHO MEAS - MV E MAX VEL: 67.6 CM/SEC
BH CV ECHO MEAS - MV E/A: 1.2
BH CV ECHO MEAS - MV P1/2T MAX VEL: 74 CM/SEC
BH CV ECHO MEAS - MV P1/2T: 93 MSEC
BH CV ECHO MEAS - MVA P1/2T LCG: 3 CM^2
BH CV ECHO MEAS - MVA(P1/2T): 2.4 CM^2
BH CV ECHO MEAS - PA MAX PG: 2.7 MMHG
BH CV ECHO MEAS - PA V2 MAX: 81.4 CM/SEC
BH CV ECHO MEAS - RAP SYSTOLE: 5 MMHG
BH CV ECHO MEAS - RVDD: 3.8 CM
BH CV ECHO MEAS - RVSP: 36.6 MMHG
BH CV ECHO MEAS - SI(CUBED): 30.6 ML/M^2
BH CV ECHO MEAS - SI(LVOT): 42 ML/M^2
BH CV ECHO MEAS - SI(MOD-SP2): 13.6 ML/M^2
BH CV ECHO MEAS - SI(MOD-SP4): 18 ML/M^2
BH CV ECHO MEAS - SI(TEICH): 26.7 ML/M^2
BH CV ECHO MEAS - SV(CUBED): 71.1 ML
BH CV ECHO MEAS - SV(LVOT): 97.6 ML
BH CV ECHO MEAS - SV(MOD-SP2): 31.7 ML
BH CV ECHO MEAS - SV(MOD-SP4): 41.8 ML
BH CV ECHO MEAS - SV(TEICH): 62.1 ML
BH CV ECHO MEAS - TR MAX VEL: 281 CM/SEC
BUN SERPL-MCNC: 13 MG/DL (ref 8–23)
BUN/CREAT SERPL: 14.8 (ref 7–25)
CALCIUM SPEC-SCNC: 8.9 MG/DL (ref 8.6–10.5)
CHLORIDE SERPL-SCNC: 103 MMOL/L (ref 98–107)
CO2 SERPL-SCNC: 23 MMOL/L (ref 22–29)
CREAT SERPL-MCNC: 0.88 MG/DL (ref 0.76–1.27)
DEPRECATED RDW RBC AUTO: 44 FL (ref 37–54)
EOSINOPHIL # BLD AUTO: 0 10*3/MM3 (ref 0–0.4)
EOSINOPHIL NFR BLD AUTO: 0 % (ref 0.3–6.2)
ERYTHROCYTE [DISTWIDTH] IN BLOOD BY AUTOMATED COUNT: 12.1 % (ref 12.3–15.4)
GFR SERPL CREATININE-BSD FRML MDRD: 105 ML/MIN/1.73
GLUCOSE BLDC GLUCOMTR-MCNC: 195 MG/DL (ref 70–130)
GLUCOSE BLDC GLUCOMTR-MCNC: 235 MG/DL (ref 70–130)
GLUCOSE SERPL-MCNC: 183 MG/DL (ref 65–99)
HCT VFR BLD AUTO: 43.1 % (ref 37.5–51)
HCYS SERPL-MCNC: 4.5 UMOL/L (ref 0–15)
HGB BLD-MCNC: 14.6 G/DL (ref 13–17.7)
IMM GRANULOCYTES # BLD AUTO: 0.02 10*3/MM3 (ref 0–0.05)
IMM GRANULOCYTES NFR BLD AUTO: 0.3 % (ref 0–0.5)
LYMPHOCYTES # BLD AUTO: 1.2 10*3/MM3 (ref 0.7–3.1)
LYMPHOCYTES NFR BLD AUTO: 17.3 % (ref 19.6–45.3)
MCH RBC QN AUTO: 33.5 PG (ref 26.6–33)
MCHC RBC AUTO-ENTMCNC: 33.9 G/DL (ref 31.5–35.7)
MCV RBC AUTO: 98.9 FL (ref 79–97)
MONOCYTES # BLD AUTO: 0.42 10*3/MM3 (ref 0.1–0.9)
MONOCYTES NFR BLD AUTO: 6.1 % (ref 5–12)
NEUTROPHILS NFR BLD AUTO: 5.26 10*3/MM3 (ref 1.7–7)
NEUTROPHILS NFR BLD AUTO: 76 % (ref 42.7–76)
NRBC BLD AUTO-RTO: 0 /100 WBC (ref 0–0.2)
PLATELET # BLD AUTO: 229 10*3/MM3 (ref 140–450)
PMV BLD AUTO: 10.1 FL (ref 6–12)
POTASSIUM SERPL-SCNC: 4.8 MMOL/L (ref 3.5–5.2)
RBC # BLD AUTO: 4.36 10*6/MM3 (ref 4.14–5.8)
SODIUM SERPL-SCNC: 138 MMOL/L (ref 136–145)
WBC NRBC COR # BLD: 6.92 10*3/MM3 (ref 3.4–10.8)

## 2022-01-25 PROCEDURE — 25010000002 HEPARIN (PORCINE) PER 1000 UNITS

## 2022-01-25 PROCEDURE — 36415 COLL VENOUS BLD VENIPUNCTURE: CPT | Performed by: INTERNAL MEDICINE

## 2022-01-25 PROCEDURE — 93306 TTE W/DOPPLER COMPLETE: CPT

## 2022-01-25 PROCEDURE — 94799 UNLISTED PULMONARY SVC/PX: CPT

## 2022-01-25 PROCEDURE — 93970 EXTREMITY STUDY: CPT

## 2022-01-25 PROCEDURE — 25010000002 PERFLUTREN (DEFINITY) 8.476 MG IN SODIUM CHLORIDE (PF) 0.9 % 10 ML INJECTION: Performed by: INTERNAL MEDICINE

## 2022-01-25 PROCEDURE — 85025 COMPLETE CBC W/AUTO DIFF WBC: CPT | Performed by: INTERNAL MEDICINE

## 2022-01-25 PROCEDURE — 93010 ELECTROCARDIOGRAM REPORT: CPT | Performed by: INTERNAL MEDICINE

## 2022-01-25 PROCEDURE — 93005 ELECTROCARDIOGRAM TRACING: CPT | Performed by: INTERNAL MEDICINE

## 2022-01-25 PROCEDURE — 25010000002 REMDESIVIR 100 MG/20ML SOLUTION 1 EACH VIAL: Performed by: INTERNAL MEDICINE

## 2022-01-25 PROCEDURE — 81240 F2 GENE: CPT | Performed by: NURSE PRACTITIONER

## 2022-01-25 PROCEDURE — 94760 N-INVAS EAR/PLS OXIMETRY 1: CPT

## 2022-01-25 PROCEDURE — 81241 F5 GENE: CPT | Performed by: NURSE PRACTITIONER

## 2022-01-25 PROCEDURE — 25010000002 DEXAMETHASONE PER 1 MG: Performed by: INTERNAL MEDICINE

## 2022-01-25 PROCEDURE — 82962 GLUCOSE BLOOD TEST: CPT

## 2022-01-25 PROCEDURE — 85730 THROMBOPLASTIN TIME PARTIAL: CPT | Performed by: INTERNAL MEDICINE

## 2022-01-25 PROCEDURE — 25010000002 HEPARIN (PORCINE) PER 1000 UNITS: Performed by: INTERNAL MEDICINE

## 2022-01-25 PROCEDURE — 83090 ASSAY OF HOMOCYSTEINE: CPT | Performed by: NURSE PRACTITIONER

## 2022-01-25 PROCEDURE — 94664 DEMO&/EVAL PT USE INHALER: CPT

## 2022-01-25 PROCEDURE — 80048 BASIC METABOLIC PNL TOTAL CA: CPT | Performed by: INTERNAL MEDICINE

## 2022-01-25 PROCEDURE — 93306 TTE W/DOPPLER COMPLETE: CPT | Performed by: INTERNAL MEDICINE

## 2022-01-25 PROCEDURE — 99223 1ST HOSP IP/OBS HIGH 75: CPT | Performed by: NURSE PRACTITIONER

## 2022-01-25 PROCEDURE — 63710000001 INSULIN ASPART PER 5 UNITS: Performed by: INTERNAL MEDICINE

## 2022-01-25 RX ADMIN — ALBUTEROL SULFATE 2 PUFF: 90 AEROSOL, METERED RESPIRATORY (INHALATION) at 08:05

## 2022-01-25 RX ADMIN — SODIUM CHLORIDE, PRESERVATIVE FREE 10 ML: 5 INJECTION INTRAVENOUS at 08:11

## 2022-01-25 RX ADMIN — SODIUM CHLORIDE, PRESERVATIVE FREE 10 ML: 5 INJECTION INTRAVENOUS at 21:50

## 2022-01-25 RX ADMIN — DEXAMETHASONE SODIUM PHOSPHATE 6 MG: 4 INJECTION, SOLUTION INTRA-ARTICULAR; INTRALESIONAL; INTRAMUSCULAR; INTRAVENOUS; SOFT TISSUE at 22:29

## 2022-01-25 RX ADMIN — PERFLUTREN 1.5 ML: 6.52 INJECTION, SUSPENSION INTRAVENOUS at 12:08

## 2022-01-25 RX ADMIN — INSULIN ASPART 4 UNITS: 100 INJECTION, SOLUTION INTRAVENOUS; SUBCUTANEOUS at 09:10

## 2022-01-25 RX ADMIN — HEPARIN SODIUM 4500 UNITS: 5000 INJECTION, SOLUTION INTRAVENOUS; SUBCUTANEOUS at 08:09

## 2022-01-25 RX ADMIN — ALBUTEROL SULFATE 2 PUFF: 90 AEROSOL, METERED RESPIRATORY (INHALATION) at 20:23

## 2022-01-25 RX ADMIN — ALBUTEROL SULFATE 2 PUFF: 90 AEROSOL, METERED RESPIRATORY (INHALATION) at 15:19

## 2022-01-25 RX ADMIN — Medication: at 02:45

## 2022-01-25 RX ADMIN — SODIUM CHLORIDE, PRESERVATIVE FREE 10 ML: 5 INJECTION INTRAVENOUS at 21:49

## 2022-01-25 RX ADMIN — INSULIN ASPART 8 UNITS: 100 INJECTION, SOLUTION INTRAVENOUS; SUBCUTANEOUS at 16:49

## 2022-01-25 RX ADMIN — DEXAMETHASONE SODIUM PHOSPHATE 6 MG: 4 INJECTION, SOLUTION INTRA-ARTICULAR; INTRALESIONAL; INTRAMUSCULAR; INTRAVENOUS; SOFT TISSUE at 12:41

## 2022-01-25 RX ADMIN — TAMSULOSIN HYDROCHLORIDE 0.4 MG: 0.4 CAPSULE ORAL at 08:11

## 2022-01-25 RX ADMIN — IPRATROPIUM BROMIDE 2 PUFF: 17 AEROSOL, METERED RESPIRATORY (INHALATION) at 08:05

## 2022-01-25 RX ADMIN — IPRATROPIUM BROMIDE 2 PUFF: 17 AEROSOL, METERED RESPIRATORY (INHALATION) at 15:19

## 2022-01-25 RX ADMIN — ASPIRIN 81 MG: 81 TABLET, CHEWABLE ORAL at 08:11

## 2022-01-25 RX ADMIN — REMDESIVIR 200 MG: 100 INJECTION, POWDER, LYOPHILIZED, FOR SOLUTION INTRAVENOUS at 05:25

## 2022-01-25 RX ADMIN — IPRATROPIUM BROMIDE 2 PUFF: 17 AEROSOL, METERED RESPIRATORY (INHALATION) at 20:23

## 2022-01-25 RX ADMIN — HEPARIN SODIUM 15.4 UNITS/KG/HR: 10000 INJECTION, SOLUTION INTRAVENOUS; SUBCUTANEOUS at 11:19

## 2022-01-26 PROBLEM — I82.409 ACUTE DEEP VEIN THROMBOSIS (DVT) OF LOWER EXTREMITY: Status: ACTIVE | Noted: 2022-01-26

## 2022-01-26 LAB
APTT PPP: 154.8 SECONDS (ref 20–40.3)
APTT PPP: 56.4 SECONDS (ref 20–40.3)
BACTERIA UR QL AUTO: ABNORMAL /HPF
BILIRUB UR QL STRIP: ABNORMAL
CLARITY UR: CLEAR
COLOR UR: ABNORMAL
F5 GENE MUT ANL BLD/T: NORMAL
FACTOR II, DNA ANALYSIS: NORMAL
GLUCOSE BLDC GLUCOMTR-MCNC: 154 MG/DL (ref 70–130)
GLUCOSE BLDC GLUCOMTR-MCNC: 171 MG/DL (ref 70–130)
GLUCOSE BLDC GLUCOMTR-MCNC: 174 MG/DL (ref 70–130)
GLUCOSE BLDC GLUCOMTR-MCNC: 187 MG/DL (ref 70–130)
GLUCOSE UR STRIP-MCNC: NEGATIVE MG/DL
HGB UR QL STRIP.AUTO: ABNORMAL
HYALINE CASTS UR QL AUTO: ABNORMAL /LPF
KETONES UR QL STRIP: NEGATIVE
LEUKOCYTE ESTERASE UR QL STRIP.AUTO: NEGATIVE
MUCOUS THREADS URNS QL MICRO: ABNORMAL /HPF
NITRITE UR QL STRIP: POSITIVE
PH UR STRIP.AUTO: 5.5 [PH] (ref 5–9)
PROT UR QL STRIP: NEGATIVE
QT INTERVAL: 470 MS
QTC INTERVAL: 470 MS
RBC # UR STRIP: ABNORMAL /HPF
REF LAB TEST METHOD: ABNORMAL
SP GR UR STRIP: 1.02 (ref 1–1.03)
SQUAMOUS #/AREA URNS HPF: ABNORMAL /HPF
UROBILINOGEN UR QL STRIP: ABNORMAL
WBC # UR STRIP: ABNORMAL /HPF

## 2022-01-26 PROCEDURE — 81001 URINALYSIS AUTO W/SCOPE: CPT | Performed by: INTERNAL MEDICINE

## 2022-01-26 PROCEDURE — 85730 THROMBOPLASTIN TIME PARTIAL: CPT | Performed by: INTERNAL MEDICINE

## 2022-01-26 PROCEDURE — 25010000002 HEPARIN (PORCINE) PER 1000 UNITS: Performed by: INTERNAL MEDICINE

## 2022-01-26 PROCEDURE — 94799 UNLISTED PULMONARY SVC/PX: CPT

## 2022-01-26 PROCEDURE — 99232 SBSQ HOSP IP/OBS MODERATE 35: CPT | Performed by: NURSE PRACTITIONER

## 2022-01-26 PROCEDURE — 63710000001 INSULIN ASPART PER 5 UNITS: Performed by: INTERNAL MEDICINE

## 2022-01-26 PROCEDURE — 25010000002 REMDESIVIR 100 MG/20ML SOLUTION 1 EACH VIAL: Performed by: INTERNAL MEDICINE

## 2022-01-26 PROCEDURE — 82962 GLUCOSE BLOOD TEST: CPT

## 2022-01-26 PROCEDURE — 25010000002 DEXAMETHASONE PER 1 MG: Performed by: INTERNAL MEDICINE

## 2022-01-26 PROCEDURE — 94760 N-INVAS EAR/PLS OXIMETRY 1: CPT

## 2022-01-26 RX ADMIN — SODIUM CHLORIDE, PRESERVATIVE FREE 10 ML: 5 INJECTION INTRAVENOUS at 09:51

## 2022-01-26 RX ADMIN — INSULIN ASPART 4 UNITS: 100 INJECTION, SOLUTION INTRAVENOUS; SUBCUTANEOUS at 10:50

## 2022-01-26 RX ADMIN — DEXAMETHASONE SODIUM PHOSPHATE 6 MG: 4 INJECTION, SOLUTION INTRA-ARTICULAR; INTRALESIONAL; INTRAMUSCULAR; INTRAVENOUS; SOFT TISSUE at 12:15

## 2022-01-26 RX ADMIN — IPRATROPIUM BROMIDE 2 PUFF: 17 AEROSOL, METERED RESPIRATORY (INHALATION) at 18:53

## 2022-01-26 RX ADMIN — RIVAROXABAN 15 MG: 15 TABLET, FILM COATED ORAL at 16:35

## 2022-01-26 RX ADMIN — ASPIRIN 81 MG: 81 TABLET, CHEWABLE ORAL at 09:49

## 2022-01-26 RX ADMIN — TAMSULOSIN HYDROCHLORIDE 0.4 MG: 0.4 CAPSULE ORAL at 09:49

## 2022-01-26 RX ADMIN — INSULIN ASPART 4 UNITS: 100 INJECTION, SOLUTION INTRAVENOUS; SUBCUTANEOUS at 09:49

## 2022-01-26 RX ADMIN — RIVAROXABAN 15 MG: 15 TABLET, FILM COATED ORAL at 10:49

## 2022-01-26 RX ADMIN — IPRATROPIUM BROMIDE 2 PUFF: 17 AEROSOL, METERED RESPIRATORY (INHALATION) at 08:08

## 2022-01-26 RX ADMIN — HEPARIN SODIUM 15.4 UNITS/KG/HR: 10000 INJECTION, SOLUTION INTRAVENOUS; SUBCUTANEOUS at 05:48

## 2022-01-26 RX ADMIN — DEXAMETHASONE SODIUM PHOSPHATE 6 MG: 4 INJECTION, SOLUTION INTRA-ARTICULAR; INTRALESIONAL; INTRAMUSCULAR; INTRAVENOUS; SOFT TISSUE at 22:35

## 2022-01-26 RX ADMIN — REMDESIVIR 100 MG: 100 INJECTION, POWDER, LYOPHILIZED, FOR SOLUTION INTRAVENOUS at 12:05

## 2022-01-26 RX ADMIN — IPRATROPIUM BROMIDE 2 PUFF: 17 AEROSOL, METERED RESPIRATORY (INHALATION) at 11:07

## 2022-01-26 RX ADMIN — ALBUTEROL SULFATE 2 PUFF: 90 AEROSOL, METERED RESPIRATORY (INHALATION) at 11:07

## 2022-01-26 RX ADMIN — ALBUTEROL SULFATE 2 PUFF: 90 AEROSOL, METERED RESPIRATORY (INHALATION) at 18:53

## 2022-01-26 RX ADMIN — HEPARIN SODIUM 4500 UNITS: 5000 INJECTION, SOLUTION INTRAVENOUS; SUBCUTANEOUS at 06:20

## 2022-01-26 RX ADMIN — INSULIN ASPART 4 UNITS: 100 INJECTION, SOLUTION INTRAVENOUS; SUBCUTANEOUS at 16:36

## 2022-01-26 RX ADMIN — ALBUTEROL SULFATE 2 PUFF: 90 AEROSOL, METERED RESPIRATORY (INHALATION) at 08:08

## 2022-01-26 RX ADMIN — SODIUM CHLORIDE, PRESERVATIVE FREE 10 ML: 5 INJECTION INTRAVENOUS at 22:35

## 2022-01-27 LAB
ANION GAP SERPL CALCULATED.3IONS-SCNC: 7 MMOL/L (ref 5–15)
BASOPHILS # BLD AUTO: 0.03 10*3/MM3 (ref 0–0.2)
BASOPHILS NFR BLD AUTO: 0.2 % (ref 0–1.5)
BUN SERPL-MCNC: 16 MG/DL (ref 8–23)
BUN/CREAT SERPL: 22.5 (ref 7–25)
CALCIUM SPEC-SCNC: 8.8 MG/DL (ref 8.6–10.5)
CHLORIDE SERPL-SCNC: 103 MMOL/L (ref 98–107)
CO2 SERPL-SCNC: 26 MMOL/L (ref 22–29)
CREAT SERPL-MCNC: 0.71 MG/DL (ref 0.76–1.27)
DEPRECATED RDW RBC AUTO: 42.5 FL (ref 37–54)
EOSINOPHIL # BLD AUTO: 0 10*3/MM3 (ref 0–0.4)
EOSINOPHIL NFR BLD AUTO: 0 % (ref 0.3–6.2)
ERYTHROCYTE [DISTWIDTH] IN BLOOD BY AUTOMATED COUNT: 11.9 % (ref 12.3–15.4)
GFR SERPL CREATININE-BSD FRML MDRD: 135 ML/MIN/1.73
GLUCOSE BLDC GLUCOMTR-MCNC: 122 MG/DL (ref 70–130)
GLUCOSE BLDC GLUCOMTR-MCNC: 141 MG/DL (ref 70–130)
GLUCOSE BLDC GLUCOMTR-MCNC: 193 MG/DL (ref 70–130)
GLUCOSE BLDC GLUCOMTR-MCNC: 200 MG/DL (ref 70–130)
GLUCOSE BLDC GLUCOMTR-MCNC: 211 MG/DL (ref 70–130)
GLUCOSE SERPL-MCNC: 133 MG/DL (ref 65–99)
HCT VFR BLD AUTO: 40.6 % (ref 37.5–51)
HGB BLD-MCNC: 14 G/DL (ref 13–17.7)
IMM GRANULOCYTES # BLD AUTO: 0.06 10*3/MM3 (ref 0–0.05)
IMM GRANULOCYTES NFR BLD AUTO: 0.4 % (ref 0–0.5)
LYMPHOCYTES # BLD AUTO: 1.42 10*3/MM3 (ref 0.7–3.1)
LYMPHOCYTES NFR BLD AUTO: 10.5 % (ref 19.6–45.3)
MCH RBC QN AUTO: 33.9 PG (ref 26.6–33)
MCHC RBC AUTO-ENTMCNC: 34.5 G/DL (ref 31.5–35.7)
MCV RBC AUTO: 98.3 FL (ref 79–97)
MONOCYTES # BLD AUTO: 0.84 10*3/MM3 (ref 0.1–0.9)
MONOCYTES NFR BLD AUTO: 6.2 % (ref 5–12)
NEUTROPHILS NFR BLD AUTO: 11.18 10*3/MM3 (ref 1.7–7)
NEUTROPHILS NFR BLD AUTO: 82.7 % (ref 42.7–76)
NRBC BLD AUTO-RTO: 0 /100 WBC (ref 0–0.2)
PLATELET # BLD AUTO: 257 10*3/MM3 (ref 140–450)
PMV BLD AUTO: 10.5 FL (ref 6–12)
POTASSIUM SERPL-SCNC: 4.6 MMOL/L (ref 3.5–5.2)
RBC # BLD AUTO: 4.13 10*6/MM3 (ref 4.14–5.8)
SODIUM SERPL-SCNC: 136 MMOL/L (ref 136–145)
WBC NRBC COR # BLD: 13.53 10*3/MM3 (ref 3.4–10.8)

## 2022-01-27 PROCEDURE — 94799 UNLISTED PULMONARY SVC/PX: CPT

## 2022-01-27 PROCEDURE — 97162 PT EVAL MOD COMPLEX 30 MIN: CPT

## 2022-01-27 PROCEDURE — 97165 OT EVAL LOW COMPLEX 30 MIN: CPT

## 2022-01-27 PROCEDURE — 25010000002 REMDESIVIR 100 MG/20ML SOLUTION 1 EACH VIAL: Performed by: INTERNAL MEDICINE

## 2022-01-27 PROCEDURE — 94760 N-INVAS EAR/PLS OXIMETRY 1: CPT

## 2022-01-27 PROCEDURE — 80048 BASIC METABOLIC PNL TOTAL CA: CPT | Performed by: INTERNAL MEDICINE

## 2022-01-27 PROCEDURE — 63710000001 INSULIN ASPART PER 5 UNITS: Performed by: INTERNAL MEDICINE

## 2022-01-27 PROCEDURE — 85025 COMPLETE CBC W/AUTO DIFF WBC: CPT | Performed by: INTERNAL MEDICINE

## 2022-01-27 PROCEDURE — 82962 GLUCOSE BLOOD TEST: CPT

## 2022-01-27 PROCEDURE — 25010000002 DEXAMETHASONE PER 1 MG: Performed by: INTERNAL MEDICINE

## 2022-01-27 RX ADMIN — INSULIN ASPART 8 UNITS: 100 INJECTION, SOLUTION INTRAVENOUS; SUBCUTANEOUS at 12:05

## 2022-01-27 RX ADMIN — IPRATROPIUM BROMIDE 2 PUFF: 17 AEROSOL, METERED RESPIRATORY (INHALATION) at 15:04

## 2022-01-27 RX ADMIN — RIVAROXABAN 15 MG: 15 TABLET, FILM COATED ORAL at 17:19

## 2022-01-27 RX ADMIN — ALBUTEROL SULFATE 2 PUFF: 90 AEROSOL, METERED RESPIRATORY (INHALATION) at 20:11

## 2022-01-27 RX ADMIN — RIVAROXABAN 15 MG: 15 TABLET, FILM COATED ORAL at 09:44

## 2022-01-27 RX ADMIN — ALBUTEROL SULFATE 2 PUFF: 90 AEROSOL, METERED RESPIRATORY (INHALATION) at 07:16

## 2022-01-27 RX ADMIN — TAMSULOSIN HYDROCHLORIDE 0.4 MG: 0.4 CAPSULE ORAL at 09:44

## 2022-01-27 RX ADMIN — SODIUM CHLORIDE, PRESERVATIVE FREE 10 ML: 5 INJECTION INTRAVENOUS at 09:46

## 2022-01-27 RX ADMIN — ALBUTEROL SULFATE 2 PUFF: 90 AEROSOL, METERED RESPIRATORY (INHALATION) at 10:30

## 2022-01-27 RX ADMIN — DEXAMETHASONE SODIUM PHOSPHATE 6 MG: 4 INJECTION, SOLUTION INTRA-ARTICULAR; INTRALESIONAL; INTRAMUSCULAR; INTRAVENOUS; SOFT TISSUE at 12:05

## 2022-01-27 RX ADMIN — REMDESIVIR 100 MG: 100 INJECTION, POWDER, LYOPHILIZED, FOR SOLUTION INTRAVENOUS at 09:48

## 2022-01-27 RX ADMIN — SODIUM CHLORIDE, PRESERVATIVE FREE 10 ML: 5 INJECTION INTRAVENOUS at 20:33

## 2022-01-27 RX ADMIN — IPRATROPIUM BROMIDE 2 PUFF: 17 AEROSOL, METERED RESPIRATORY (INHALATION) at 10:30

## 2022-01-27 RX ADMIN — ASPIRIN 81 MG: 81 TABLET, CHEWABLE ORAL at 09:44

## 2022-01-27 RX ADMIN — ALBUTEROL SULFATE 2 PUFF: 90 AEROSOL, METERED RESPIRATORY (INHALATION) at 15:04

## 2022-01-27 RX ADMIN — IPRATROPIUM BROMIDE 2 PUFF: 17 AEROSOL, METERED RESPIRATORY (INHALATION) at 07:16

## 2022-01-27 RX ADMIN — DEXAMETHASONE SODIUM PHOSPHATE 6 MG: 4 INJECTION, SOLUTION INTRA-ARTICULAR; INTRALESIONAL; INTRAMUSCULAR; INTRAVENOUS; SOFT TISSUE at 20:33

## 2022-01-27 RX ADMIN — IPRATROPIUM BROMIDE 2 PUFF: 17 AEROSOL, METERED RESPIRATORY (INHALATION) at 20:11

## 2022-01-28 LAB
ALBUMIN SERPL-MCNC: 3 G/DL (ref 3.5–5.2)
ALBUMIN/GLOB SERPL: 0.9 G/DL
ALP SERPL-CCNC: 133 U/L (ref 39–117)
ALT SERPL W P-5'-P-CCNC: 63 U/L (ref 1–41)
ANION GAP SERPL CALCULATED.3IONS-SCNC: 5 MMOL/L (ref 5–15)
AST SERPL-CCNC: 58 U/L (ref 1–40)
BILIRUB CONJ SERPL-MCNC: 0.2 MG/DL (ref 0–0.3)
BILIRUB SERPL-MCNC: 0.7 MG/DL (ref 0–1.2)
BUN SERPL-MCNC: 15 MG/DL (ref 8–23)
BUN/CREAT SERPL: 20.3 (ref 7–25)
CALCIUM SPEC-SCNC: 9 MG/DL (ref 8.6–10.5)
CHLORIDE SERPL-SCNC: 103 MMOL/L (ref 98–107)
CO2 SERPL-SCNC: 30 MMOL/L (ref 22–29)
CREAT SERPL-MCNC: 0.74 MG/DL (ref 0.76–1.27)
GFR SERPL CREATININE-BSD FRML MDRD: 129 ML/MIN/1.73
GLOBULIN UR ELPH-MCNC: 3.4 GM/DL
GLUCOSE BLDC GLUCOMTR-MCNC: 126 MG/DL (ref 70–130)
GLUCOSE BLDC GLUCOMTR-MCNC: 147 MG/DL (ref 70–130)
GLUCOSE BLDC GLUCOMTR-MCNC: 198 MG/DL (ref 70–130)
GLUCOSE BLDC GLUCOMTR-MCNC: 313 MG/DL (ref 70–130)
GLUCOSE SERPL-MCNC: 144 MG/DL (ref 65–99)
POTASSIUM SERPL-SCNC: 5 MMOL/L (ref 3.5–5.2)
PROT SERPL-MCNC: 6.4 G/DL (ref 6–8.5)
SODIUM SERPL-SCNC: 138 MMOL/L (ref 136–145)
WHOLE BLOOD HOLD SPECIMEN: NORMAL

## 2022-01-28 PROCEDURE — 94760 N-INVAS EAR/PLS OXIMETRY 1: CPT

## 2022-01-28 PROCEDURE — 97535 SELF CARE MNGMENT TRAINING: CPT

## 2022-01-28 PROCEDURE — 82248 BILIRUBIN DIRECT: CPT | Performed by: INTERNAL MEDICINE

## 2022-01-28 PROCEDURE — 25010000002 REMDESIVIR 100 MG/20ML SOLUTION 1 EACH VIAL: Performed by: INTERNAL MEDICINE

## 2022-01-28 PROCEDURE — 63710000001 INSULIN ASPART PER 5 UNITS: Performed by: INTERNAL MEDICINE

## 2022-01-28 PROCEDURE — 94799 UNLISTED PULMONARY SVC/PX: CPT

## 2022-01-28 PROCEDURE — 25010000002 DEXAMETHASONE PER 1 MG: Performed by: INTERNAL MEDICINE

## 2022-01-28 PROCEDURE — 80053 COMPREHEN METABOLIC PANEL: CPT | Performed by: INTERNAL MEDICINE

## 2022-01-28 PROCEDURE — 82962 GLUCOSE BLOOD TEST: CPT

## 2022-01-28 RX ADMIN — IPRATROPIUM BROMIDE 2 PUFF: 17 AEROSOL, METERED RESPIRATORY (INHALATION) at 08:11

## 2022-01-28 RX ADMIN — SODIUM CHLORIDE, PRESERVATIVE FREE 10 ML: 5 INJECTION INTRAVENOUS at 23:42

## 2022-01-28 RX ADMIN — ALBUTEROL SULFATE 2 PUFF: 90 AEROSOL, METERED RESPIRATORY (INHALATION) at 20:32

## 2022-01-28 RX ADMIN — REMDESIVIR 100 MG: 100 INJECTION, POWDER, LYOPHILIZED, FOR SOLUTION INTRAVENOUS at 08:57

## 2022-01-28 RX ADMIN — DEXAMETHASONE SODIUM PHOSPHATE 6 MG: 4 INJECTION, SOLUTION INTRA-ARTICULAR; INTRALESIONAL; INTRAMUSCULAR; INTRAVENOUS; SOFT TISSUE at 12:19

## 2022-01-28 RX ADMIN — RIVAROXABAN 15 MG: 15 TABLET, FILM COATED ORAL at 08:57

## 2022-01-28 RX ADMIN — INSULIN ASPART 4 UNITS: 100 INJECTION, SOLUTION INTRAVENOUS; SUBCUTANEOUS at 17:44

## 2022-01-28 RX ADMIN — SODIUM CHLORIDE, PRESERVATIVE FREE 10 ML: 5 INJECTION INTRAVENOUS at 08:57

## 2022-01-28 RX ADMIN — RIVAROXABAN 15 MG: 15 TABLET, FILM COATED ORAL at 17:43

## 2022-01-28 RX ADMIN — ASPIRIN 81 MG: 81 TABLET, CHEWABLE ORAL at 08:56

## 2022-01-28 RX ADMIN — ALBUTEROL SULFATE 2 PUFF: 90 AEROSOL, METERED RESPIRATORY (INHALATION) at 11:33

## 2022-01-28 RX ADMIN — DEXAMETHASONE SODIUM PHOSPHATE 6 MG: 4 INJECTION, SOLUTION INTRA-ARTICULAR; INTRALESIONAL; INTRAMUSCULAR; INTRAVENOUS; SOFT TISSUE at 23:42

## 2022-01-28 RX ADMIN — IPRATROPIUM BROMIDE 2 PUFF: 17 AEROSOL, METERED RESPIRATORY (INHALATION) at 20:32

## 2022-01-28 RX ADMIN — TAMSULOSIN HYDROCHLORIDE 0.4 MG: 0.4 CAPSULE ORAL at 08:57

## 2022-01-28 RX ADMIN — IPRATROPIUM BROMIDE 2 PUFF: 17 AEROSOL, METERED RESPIRATORY (INHALATION) at 15:33

## 2022-01-28 RX ADMIN — IPRATROPIUM BROMIDE 2 PUFF: 17 AEROSOL, METERED RESPIRATORY (INHALATION) at 11:33

## 2022-01-28 RX ADMIN — ALBUTEROL SULFATE 2 PUFF: 90 AEROSOL, METERED RESPIRATORY (INHALATION) at 08:10

## 2022-01-28 RX ADMIN — ALBUTEROL SULFATE 2 PUFF: 90 AEROSOL, METERED RESPIRATORY (INHALATION) at 15:33

## 2022-01-29 ENCOUNTER — READMISSION MANAGEMENT (OUTPATIENT)
Dept: CALL CENTER | Facility: HOSPITAL | Age: 67
End: 2022-01-29

## 2022-01-29 VITALS
BODY MASS INDEX: 33.23 KG/M2 | OXYGEN SATURATION: 93 % | WEIGHT: 245.3 LBS | HEART RATE: 58 BPM | SYSTOLIC BLOOD PRESSURE: 125 MMHG | HEIGHT: 72 IN | DIASTOLIC BLOOD PRESSURE: 81 MMHG | TEMPERATURE: 96.5 F | RESPIRATION RATE: 16 BRPM

## 2022-01-29 LAB
ALBUMIN SERPL-MCNC: 2.9 G/DL (ref 3.5–5.2)
ALP SERPL-CCNC: 139 U/L (ref 39–117)
ALT SERPL W P-5'-P-CCNC: 74 U/L (ref 1–41)
AST SERPL-CCNC: 66 U/L (ref 1–40)
BACTERIA SPEC AEROBE CULT: NORMAL
BACTERIA SPEC AEROBE CULT: NORMAL
BILIRUB CONJ SERPL-MCNC: 0.2 MG/DL (ref 0–0.3)
BILIRUB INDIRECT SERPL-MCNC: 0.4 MG/DL
BILIRUB SERPL-MCNC: 0.6 MG/DL (ref 0–1.2)
GLUCOSE BLDC GLUCOMTR-MCNC: 137 MG/DL (ref 70–130)
GLUCOSE BLDC GLUCOMTR-MCNC: 204 MG/DL (ref 70–130)
PROT SERPL-MCNC: 6.1 G/DL (ref 6–8.5)
WHOLE BLOOD HOLD SPECIMEN: NORMAL

## 2022-01-29 PROCEDURE — 80076 HEPATIC FUNCTION PANEL: CPT | Performed by: INTERNAL MEDICINE

## 2022-01-29 PROCEDURE — 94799 UNLISTED PULMONARY SVC/PX: CPT

## 2022-01-29 PROCEDURE — 94760 N-INVAS EAR/PLS OXIMETRY 1: CPT

## 2022-01-29 PROCEDURE — 63710000001 INSULIN ASPART PER 5 UNITS: Performed by: INTERNAL MEDICINE

## 2022-01-29 PROCEDURE — 25010000002 REMDESIVIR 100 MG/20ML SOLUTION 1 EACH VIAL: Performed by: INTERNAL MEDICINE

## 2022-01-29 PROCEDURE — 25010000002 DEXAMETHASONE PER 1 MG: Performed by: INTERNAL MEDICINE

## 2022-01-29 PROCEDURE — 82962 GLUCOSE BLOOD TEST: CPT

## 2022-01-29 PROCEDURE — 94664 DEMO&/EVAL PT USE INHALER: CPT

## 2022-01-29 RX ORDER — DEXAMETHASONE 6 MG/1
6 TABLET ORAL 2 TIMES DAILY WITH MEALS
Qty: 10 TABLET | Refills: 0 | Status: SHIPPED | OUTPATIENT
Start: 2022-01-29 | End: 2022-02-15

## 2022-01-29 RX ORDER — CHOLECALCIFEROL (VITAMIN D3) 1250 MCG
1 TABLET ORAL WEEKLY
Start: 2022-01-29 | End: 2022-10-11 | Stop reason: SDUPTHER

## 2022-01-29 RX ORDER — ALBUTEROL SULFATE 90 UG/1
2 AEROSOL, METERED RESPIRATORY (INHALATION)
Qty: 18 G | Refills: 1 | Status: SHIPPED | OUTPATIENT
Start: 2022-01-29 | End: 2023-03-06

## 2022-01-29 RX ORDER — ASPIRIN 81 MG/1
81 TABLET, CHEWABLE ORAL DAILY
Start: 2022-01-29

## 2022-01-29 RX ORDER — TAMSULOSIN HYDROCHLORIDE 0.4 MG/1
0.4 CAPSULE ORAL DAILY
Start: 2022-01-29

## 2022-01-29 RX ADMIN — REMDESIVIR 100 MG: 100 INJECTION, POWDER, LYOPHILIZED, FOR SOLUTION INTRAVENOUS at 10:05

## 2022-01-29 RX ADMIN — INSULIN ASPART 8 UNITS: 100 INJECTION, SOLUTION INTRAVENOUS; SUBCUTANEOUS at 08:40

## 2022-01-29 RX ADMIN — IPRATROPIUM BROMIDE 2 PUFF: 17 AEROSOL, METERED RESPIRATORY (INHALATION) at 11:20

## 2022-01-29 RX ADMIN — ALBUTEROL SULFATE 2 PUFF: 90 AEROSOL, METERED RESPIRATORY (INHALATION) at 08:30

## 2022-01-29 RX ADMIN — IPRATROPIUM BROMIDE 2 PUFF: 17 AEROSOL, METERED RESPIRATORY (INHALATION) at 08:30

## 2022-01-29 RX ADMIN — ALBUTEROL SULFATE 2 PUFF: 90 AEROSOL, METERED RESPIRATORY (INHALATION) at 11:20

## 2022-01-29 RX ADMIN — DEXAMETHASONE SODIUM PHOSPHATE 6 MG: 4 INJECTION, SOLUTION INTRA-ARTICULAR; INTRALESIONAL; INTRAMUSCULAR; INTRAVENOUS; SOFT TISSUE at 10:05

## 2022-01-29 RX ADMIN — ASPIRIN 81 MG: 81 TABLET, CHEWABLE ORAL at 08:39

## 2022-01-29 RX ADMIN — SODIUM CHLORIDE, PRESERVATIVE FREE 10 ML: 5 INJECTION INTRAVENOUS at 08:40

## 2022-01-29 RX ADMIN — TAMSULOSIN HYDROCHLORIDE 0.4 MG: 0.4 CAPSULE ORAL at 08:39

## 2022-01-29 RX ADMIN — RIVAROXABAN 15 MG: 15 TABLET, FILM COATED ORAL at 08:42

## 2022-01-29 NOTE — OUTREACH NOTE
Prep Survey      Responses   [facility] facility patient discharged from? [place]   Is LACE score < 7 ? No   Emergency Room discharge w/ pulse ox? No   Eligibility Readm Mgmt   Discharge diagnosis Pneumonia due to COVID-19 virus   Does the patient have one of the following disease processes/diagnoses(primary or secondary)? COVID-19   Does the patient have Home health ordered? No   Is there a DME ordered? No   Prep survey completed? Yes          Talia Montoya RN

## 2022-01-30 ENCOUNTER — READMISSION MANAGEMENT (OUTPATIENT)
Dept: CALL CENTER | Facility: HOSPITAL | Age: 67
End: 2022-01-30

## 2022-01-30 NOTE — OUTREACH NOTE
COVID-19 Week 1 Survey      Responses   [facility] [place] patient discharged from? [place]   Does the patient have one of the following disease processes/diagnoses(primary or secondary)? COVID-19   COVID-19 underlying condition? None   Call Number Call 1   Call end time 1059   Discharge diagnosis Pneumonia due to COVID-19 virus   Meds reviewed with patient/caregiver? Yes   Is the patient having any side effects they believe may be caused by any medication additions or changes? No   Does the patient have all medications ordered at discharge? Yes   Is the patient taking all medications as directed (includes completed medication regime)? Yes   Does the patient have a primary care provider?  Yes   Does the patient have an appointment with their PCP or specialist within 7 days of discharge? No   What is preventing the patient from scheduling follow up appointments within 7 days of discharge? Haven't had time   Has home health visited the patient within 72 hours of discharge? N/A   Psychosocial issues? No   Did the patient receive a copy of their discharge instructions? Yes   Did the patient receive a copy of COVID-19 specific instructions? Yes   What is the patient's perception of their health status since discharge? Improving   Does the patient have any of the following symptoms? Cough   Pulse Ox monitoring None   Is the patient/caregiver able to teach back steps to recovery at home? Rest and rebuild strength, gradually increase activity,  Set small, achievable goals for return to baseline health   COVID-19 call completed? Yes   Wrap up additional comments Denies needs.          Courtney Paredes, RN

## 2022-01-31 ENCOUNTER — READMISSION MANAGEMENT (OUTPATIENT)
Dept: CALL CENTER | Facility: HOSPITAL | Age: 67
End: 2022-01-31

## 2022-01-31 NOTE — OUTREACH NOTE
COVID-19 Week 1 Survey      Responses   [facility] patient discharged from? [place]   Does the patient have one of the following disease processes/diagnoses(primary or secondary)? COVID-19   COVID-19 underlying condition? None   Call Number Call 2   Week 1 Call successful? Yes   Call start time 1724   Call end time 1729   Discharge diagnosis Pneumonia due to COVID-19 virus   Meds reviewed with patient/caregiver? Yes   Is the patient having any side effects they believe may be caused by any medication additions or changes? No   Does the patient have all medications ordered at discharge? Yes   Is the patient taking all medications as directed (includes completed medication regime)? Yes   Does the patient have a primary care provider?  Yes   Does the patient have an appointment with their PCP or specialist within 7 days of discharge? Yes  [2/4/22]   Has the patient kept scheduled appointments due by today? N/A   Has home health visited the patient within 72 hours of discharge? N/A   Psychosocial issues? No   Did the patient receive a copy of their discharge instructions? Yes   Did the patient receive a copy of COVID-19 specific instructions? Yes   Nursing interventions Reviewed instructions with patient   What is the patient's perception of their health status since discharge? Improving   Does the patient have any of the following symptoms? Shortness of breath,  Cough   Nursing Interventions Nurse provided patient education   Pulse Ox monitoring None  [trying to get pulse oximeter]   Is the patient/caregiver able to teach back steps to recovery at home? Set small, achievable goals for return to baseline health,  Rest and rebuild strength, gradually increase activity,  Eat a well-balance diet,  Make a list of questions for provider's appointment   If the patient is a current smoker, are they able to teach back resources for cessation? --  [just stopped smoking in last 2 weeks]   Is the patient/caregiver able  to teach back the hierarchy of who to call/visit for symptoms/problems? PCP, Specialist, Home health nurse, Urgent Care, ED, 911 Yes   COVID-19 call completed? Yes   Wrap up additional comments states felt like the hospital was like [facility], everyone was so nice , and caring          Billie Costa RN

## 2022-02-01 ENCOUNTER — READMISSION MANAGEMENT (OUTPATIENT)
Dept: CALL CENTER | Facility: HOSPITAL | Age: 67
End: 2022-02-01

## 2022-02-01 NOTE — OUTREACH NOTE
COVID-19 Week 1 Survey      Responses   [facility] patient discharged from? [place]   Does the patient have one of the following disease processes/diagnoses(primary or secondary)? COVID-19   COVID-19 underlying condition? None   Call Number Call 3   Week 1 Call successful? Yes   Call start time 1307   Call end time 1309   Discharge diagnosis Pneumonia due to COVID-19 virus   Has home health visited the patient within 72 hours of discharge? N/A   Psychosocial issues? No   What is the patient's perception of their health status since discharge? Improving   Does the patient have any of the following symptoms? Cough,  Shortness of breath   Nursing Interventions Nurse provided patient education   Pulse Ox monitoring None   Is the patient/caregiver able to teach back steps to recovery at home? Set small, achievable goals for return to baseline health,  Rest and rebuild strength, gradually increase activity,  Eat a well-balance diet   Is the patient/caregiver able to teach back the hierarchy of who to call/visit for symptoms/problems? PCP, Specialist, Home health nurse, Urgent Care, ED, 911 Yes   COVID-19 call completed? Yes          Teetee Sylvester RN

## 2022-02-02 LAB
QT INTERVAL: 370 MS
QTC INTERVAL: 442 MS

## 2022-02-15 ENCOUNTER — OFFICE VISIT (OUTPATIENT)
Dept: CARDIAC SURGERY | Facility: CLINIC | Age: 67
End: 2022-02-15

## 2022-02-15 VITALS
SYSTOLIC BLOOD PRESSURE: 140 MMHG | OXYGEN SATURATION: 98 % | WEIGHT: 252 LBS | DIASTOLIC BLOOD PRESSURE: 80 MMHG | BODY MASS INDEX: 34.13 KG/M2 | HEART RATE: 71 BPM | HEIGHT: 72 IN

## 2022-02-15 DIAGNOSIS — I82.432 ACUTE DEEP VEIN THROMBOSIS (DVT) OF POPLITEAL VEIN OF LEFT LOWER EXTREMITY: ICD-10-CM

## 2022-02-15 DIAGNOSIS — I26.92 ACUTE SADDLE PULMONARY EMBOLISM WITHOUT ACUTE COR PULMONALE: Primary | ICD-10-CM

## 2022-02-15 DIAGNOSIS — Z71.89 ENCOUNTER FOR ANTICOAGULATION DISCUSSION AND COUNSELING: ICD-10-CM

## 2022-02-15 PROCEDURE — 99214 OFFICE O/P EST MOD 30 MIN: CPT | Performed by: NURSE PRACTITIONER

## 2022-02-15 NOTE — PROGRESS NOTES
CVTS Office Progress Note     2/15/2022    Scott Scanlon  1955    Chief Complaint:    Chief Complaint   Patient presents with   • Pulmonary Embolism       HPI:      PCP:  Gita Barnard APRN    66 y.o. male with HTN(stable, increased risk stroke, rupture), Hyperlipidemia(stable, increased risk cardiovascular events) and Diabetes Mellitus(stable, increased risk cardiovascular events)  BPH, prior surgical history includes R TKA, L knee arthroscopy. smokes 0.25 PPD.  Admit 1/24/22 with new onset shortness of breath <24hrs.  Symptoms were associated with pre-syncope with minimal level of exertion, reported to ED for evaluation.  Found to have large saddle pulmonary embolism, vascular surgery consulted for evaluation. Preliminary hypercoag work up negative.  Breathing has much improved, returns today in follow up.  No other associated symptoms or modifying factors.     1/2022 CTA Chest: Saddle pulmonary embolism  1/2022 Bilateral venous: Right peroneal DVT, Left DVT popliteal through tibials  1/2022 Echocardiogram: EF 60%, RVSP 36, trace TR, trace MR      The following portions of the patient's history were reviewed and updated as appropriate: allergies, current medications, past family history, past medical history, past social history, past surgical history and problem list.  Recent images independently reviewed.  Available laboratory values reviewed.    PMH:  Past Medical History:   Diagnosis Date   • BPH (benign prostatic hyperplasia)    • Diabetes mellitus (HCC)    • Hypercholesterolemia    • Hyperlipidemia      Past Surgical History:   Procedure Laterality Date   • COLONOSCOPY N/A 1/10/2022   • JOINT REPLACEMENT     • KNEE ARTHROSCOPY Left    • TOTAL KNEE ARTHROPLASTY Right      Family History   Problem Relation Age of Onset   • Hypertension Mother    • Hypertension Father      Social History     Tobacco Use   • Smoking status: Former Smoker     Types: Cigarettes   • Smokeless tobacco: Never Used    Vaping Use   • Vaping Use: Never used   Substance Use Topics   • Alcohol use: Yes   • Drug use: Never       ALLERGIES:  Allergies   Allergen Reactions   • Celecoxib Confusion   • Corticosteroids Other (See Comments)     Pt states caused convulsions   • Penicillins Confusion         MEDICATIONS:    Current Outpatient Medications:   •  albuterol sulfate  (90 Base) MCG/ACT inhaler, Inhale 2 puffs 4 (Four) Times a Day., Disp: 18 g, Rfl: 1  •  aspirin 81 MG chewable tablet, Chew 1 tablet Daily., Disp: , Rfl:   •  Cholecalciferol (Dialyvite Vitamin D3 Max) 1.25 MG (30588 UT) tablet, Take 1 tablet by mouth 1 (One) Time Per Week., Disp: , Rfl:   •  dexamethasone (DECADRON) 6 MG tablet, Take 1 tablet by mouth 2 (Two) Times a Day With Meals., Disp: 10 tablet, Rfl: 0  •  lovastatin (MEVACOR) 40 MG tablet, Take 40 mg by mouth Every Night., Disp: , Rfl:   •  metFORMIN (GLUCOPHAGE) 1000 MG tablet, Take 1 tablet by mouth Daily With Breakfast., Disp: , Rfl:   •  rivaroxaban (XARELTO) 15 MG tablet, Take 1 tablet by mouth 2 (Two) Times a Day With Meals for 18 days. Indications: DVT/PE (active thrombosis), Disp: 36 tablet, Rfl: 0  •  rivaroxaban (Xarelto) 20 MG tablet, Take 1 tablet by mouth Daily., Disp: 30 tablet, Rfl: 5  •  tamsulosin (FLOMAX) 0.4 MG capsule 24 hr capsule, Take 1 capsule by mouth Daily., Disp: , Rfl:       Review of Systems   Constitutional: Negative for chills, decreased appetite, fever and weight loss.   HENT: Negative for congestion, nosebleeds and sore throat.    Eyes: Negative for blurred vision, visual disturbance and visual halos.   Cardiovascular: Positive for dyspnea on exertion. Negative for chest pain and leg swelling.   Respiratory: Positive for cough. Negative for shortness of breath, sputum production and wheezing.    Endocrine: Negative for cold intolerance and polyuria.   Hematologic/Lymphatic: Negative for bleeding problem. Does not bruise/bleed easily.   Skin: Positive for unusual hair  "distribution. Negative for flushing and nail changes.   Musculoskeletal: Positive for arthritis, back pain and joint pain.   Gastrointestinal: Negative for bloating, abdominal pain, hematemesis, melena, nausea and vomiting.   Genitourinary: Negative for flank pain and hematuria.   Neurological: Negative for brief paralysis, difficulty with concentration, focal weakness, light-headedness, loss of balance, numbness, paresthesias and weakness.   Psychiatric/Behavioral: Negative for altered mental status, depression, substance abuse and suicidal ideas.   Allergic/Immunologic: Negative for hives and persistent infections.         Vitals:    02/15/22 1123   BP: 140/80   BP Location: Left arm   Patient Position: Sitting   Cuff Size: Adult   Pulse: 71   SpO2: 98%   Weight: 114 kg (252 lb)   Height: 182.9 cm (72\")     Physical Exam  Vitals and nursing note reviewed.   Constitutional:       Appearance: Normal appearance.   HENT:      Head: Normocephalic.      Nose: Nose normal.      Mouth/Throat:      Mouth: Mucous membranes are moist.   Eyes:      Pupils: Pupils are equal, round, and reactive to light.   Cardiovascular:      Rate and Rhythm: Normal rate.      Pulses: Normal pulses.   Pulmonary:      Effort: Pulmonary effort is normal.   Abdominal:      General: Abdomen is flat.      Palpations: Abdomen is soft.   Musculoskeletal:         General: Normal range of motion.      Cervical back: Normal range of motion.      Right lower leg: No edema.      Left lower leg: No edema.   Skin:     General: Skin is warm and dry.      Capillary Refill: Capillary refill takes less than 2 seconds.   Neurological:      General: No focal deficit present.      Mental Status: He is alert and oriented to person, place, and time.   Psychiatric:         Mood and Affect: Mood normal.         Assessment & Plan     Independent Review of Studies    1. Acute saddle pulmonary embolism without acute cor pulmonale (HCC)  1st event provoked in the setting " of COVID pneumonia    Preliminary hypercoagulable evaluation with factor V, factor II, homocysteine negative.  Completion of lupus anticoagulant, antiphospholipid syndrome, antithrombin III, and protein C/S studies to be performed after discontinuation of anticoagulation.     Duration of anticoagulation 6 months, near finished with acute dosing.  RX for 20mg tablets sent to pharmacy, medicaid Hospital echo without evidence of RV strain    - CBC (No Diff); Future    2. Acute deep vein thrombosis (DVT) of popliteal vein of left lower extremity (HCC)  1st event provoked    Repeat duplex in 3 months, asymptomatic right now    - Duplex Venous Lower Extremity - Left CAR; Future    3. Encounter for anticoagulation discussion and counseling  Adverse bleeding events that require evaluation includes blood in the urine, stool, gums, and nose.  If you are to experience these symptoms you should present to the heart and vascular center for evaluation.  Avoid NSAID's such as ibuprofen and naproxen for pain relief as these medications increase your risk of gastrointestinal bleeding.  Over the counter supplements such as garlic, gingko biloba, and other herbs may increase bleeding risks.     Check CBC in 3 months      Detailed discussion regarding risks, benefits, and treatment plan. Images independently reviewed. Patient understands, agrees, and wishes to proceed with plan.       This document has been electronically signed by SHOAIB CamargoP-BC @  On February 15, 2022 14:22 CST

## 2022-02-15 NOTE — PATIENT INSTRUCTIONS
Adverse bleeding events that require evaluation includes blood in the urine, stool, gums, and nose.  If you are to experience these symptoms you should present to the heart and vascular center for evaluation.  Avoid NSAID's such as ibuprofen and naproxen for pain relief as these medications increase your risk of gastrointestinal bleeding.  Over the counter supplements such as garlic, gingko biloba, and other herbs may increase bleeding risks. Do not stop your medication unless directed by a provider, take care in making sure you have adequate supply so that you are not without medication. If this medicine becomes unaffordable please contact heart and vascular center for evaluation before discontinuing.    Return 3 months recheck venous ultrasound and CBC

## 2022-07-06 ENCOUNTER — OFFICE VISIT (OUTPATIENT)
Dept: CARDIAC SURGERY | Facility: CLINIC | Age: 67
End: 2022-07-06

## 2022-07-06 ENCOUNTER — LAB (OUTPATIENT)
Dept: LAB | Facility: HOSPITAL | Age: 67
End: 2022-07-06

## 2022-07-06 VITALS
SYSTOLIC BLOOD PRESSURE: 144 MMHG | HEART RATE: 61 BPM | DIASTOLIC BLOOD PRESSURE: 82 MMHG | HEIGHT: 72 IN | WEIGHT: 258 LBS | OXYGEN SATURATION: 99 % | BODY MASS INDEX: 34.95 KG/M2

## 2022-07-06 DIAGNOSIS — I26.92 ACUTE SADDLE PULMONARY EMBOLISM WITHOUT ACUTE COR PULMONALE: Primary | ICD-10-CM

## 2022-07-06 DIAGNOSIS — Z71.89 ENCOUNTER FOR ANTICOAGULATION DISCUSSION AND COUNSELING: ICD-10-CM

## 2022-07-06 DIAGNOSIS — I26.92 ACUTE SADDLE PULMONARY EMBOLISM WITHOUT ACUTE COR PULMONALE: ICD-10-CM

## 2022-07-06 DIAGNOSIS — I82.402 ACUTE DEEP VEIN THROMBOSIS (DVT) OF LEFT LOWER EXTREMITY, UNSPECIFIED VEIN: ICD-10-CM

## 2022-07-06 LAB
DEPRECATED RDW RBC AUTO: 45.5 FL (ref 37–54)
ERYTHROCYTE [DISTWIDTH] IN BLOOD BY AUTOMATED COUNT: 12.6 % (ref 12.3–15.4)
HCT VFR BLD AUTO: 45.9 % (ref 37.5–51)
HGB BLD-MCNC: 15.3 G/DL (ref 13–17.7)
MCH RBC QN AUTO: 32.8 PG (ref 26.6–33)
MCHC RBC AUTO-ENTMCNC: 33.3 G/DL (ref 31.5–35.7)
MCV RBC AUTO: 98.5 FL (ref 79–97)
PLATELET # BLD AUTO: 168 10*3/MM3 (ref 140–450)
PMV BLD AUTO: 10.9 FL (ref 6–12)
RBC # BLD AUTO: 4.66 10*6/MM3 (ref 4.14–5.8)
WBC NRBC COR # BLD: 6.67 10*3/MM3 (ref 3.4–10.8)

## 2022-07-06 PROCEDURE — 85027 COMPLETE CBC AUTOMATED: CPT

## 2022-07-06 PROCEDURE — 36415 COLL VENOUS BLD VENIPUNCTURE: CPT

## 2022-07-06 PROCEDURE — 99214 OFFICE O/P EST MOD 30 MIN: CPT | Performed by: NURSE PRACTITIONER

## 2022-07-06 NOTE — PROGRESS NOTES
CVTS Office Progress Note     7/6/2022    Scott Scanlon  1955    Chief Complaint:    Chief Complaint   Patient presents with   • saddle pulmonary embolism       HPI:      PCP:  Gita Barnard APRN    66 y.o. male with HTN(stable, increased risk stroke, rupture), Hyperlipidemia(stable, increased risk cardiovascular events) and Diabetes Mellitus(stable, increased risk cardiovascular events)  BPH, prior surgical history includes R TKA, L knee arthroscopy. smokes 0.25 PPD.  Admit 1/24/22 with new onset shortness of breath <24hrs.  Symptoms were associated with pre-syncope with minimal level of exertion, reported to ED for evaluation.  Found to have large saddle pulmonary embolism, vascular surgery consulted for evaluation. Preliminary hypercoag work up negative.  Breathing has much improved, returns today in follow up.  No other associated symptoms or modifying factors.     1/2022 CTA Chest: Saddle pulmonary embolism  1/2022 Bilateral venous: Right peroneal DVT, Left DVT popliteal through tibials  1/2022 Echocardiogram: EF 60%, RVSP 36, trace TR, trace MR  7/2022 LLE Venous: Negative for DVT    The following portions of the patient's history were reviewed and updated as appropriate: allergies, current medications, past family history, past medical history, past social history, past surgical history and problem list.  Recent images independently reviewed.  Available laboratory values reviewed.    PMH:  Past Medical History:   Diagnosis Date   • BPH (benign prostatic hyperplasia)    • Diabetes mellitus (HCC)    • Hypercholesterolemia    • Hyperlipidemia      Past Surgical History:   Procedure Laterality Date   • COLONOSCOPY N/A 1/10/2022   • JOINT REPLACEMENT     • KNEE ARTHROSCOPY Left    • TOTAL KNEE ARTHROPLASTY Right      Family History   Problem Relation Age of Onset   • Hypertension Mother    • Hypertension Father      Social History     Tobacco Use   • Smoking status: Former Smoker     Types:  Cigarettes   • Smokeless tobacco: Never Used   Vaping Use   • Vaping Use: Never used   Substance Use Topics   • Alcohol use: Yes   • Drug use: Never       ALLERGIES:  Allergies   Allergen Reactions   • Celecoxib Confusion   • Corticosteroids Other (See Comments)     Pt states caused convulsions   • Penicillins Confusion         MEDICATIONS:    Current Outpatient Medications:   •  albuterol sulfate  (90 Base) MCG/ACT inhaler, Inhale 2 puffs 4 (Four) Times a Day., Disp: 18 g, Rfl: 1  •  aspirin 81 MG chewable tablet, Chew 1 tablet Daily., Disp: , Rfl:   •  Cholecalciferol (Dialyvite Vitamin D3 Max) 1.25 MG (46162 UT) tablet, Take 1 tablet by mouth 1 (One) Time Per Week., Disp: , Rfl:   •  lovastatin (MEVACOR) 40 MG tablet, Take 40 mg by mouth Every Night., Disp: , Rfl:   •  metFORMIN (GLUCOPHAGE) 1000 MG tablet, Take 1 tablet by mouth Daily With Breakfast., Disp: , Rfl:   •  rivaroxaban (Xarelto) 20 MG tablet, Take 1 tablet by mouth Daily., Disp: 30 tablet, Rfl: 5  •  tamsulosin (FLOMAX) 0.4 MG capsule 24 hr capsule, Take 1 capsule by mouth Daily., Disp: , Rfl:   •  rivaroxaban (XARELTO) 15 MG tablet, Take 1 tablet by mouth 2 (Two) Times a Day With Meals for 18 days. Indications: DVT/PE (active thrombosis), Disp: 36 tablet, Rfl: 0      Review of Systems   Constitutional: Negative for chills, decreased appetite, fever and weight loss.   HENT: Negative for congestion, nosebleeds and sore throat.    Eyes: Negative for blurred vision, visual disturbance and visual halos.   Cardiovascular: Positive for dyspnea on exertion. Negative for chest pain and leg swelling.   Respiratory: Positive for cough. Negative for shortness of breath, sputum production and wheezing.    Endocrine: Negative for cold intolerance and polyuria.   Hematologic/Lymphatic: Negative for bleeding problem. Does not bruise/bleed easily.   Skin: Positive for unusual hair distribution. Negative for flushing and nail changes.   Musculoskeletal:  "Positive for arthritis, back pain and joint pain.   Gastrointestinal: Negative for bloating, abdominal pain, hematemesis, melena, nausea and vomiting.   Genitourinary: Negative for flank pain and hematuria.   Neurological: Negative for brief paralysis, difficulty with concentration, focal weakness, light-headedness, loss of balance, numbness, paresthesias and weakness.   Psychiatric/Behavioral: Negative for altered mental status, depression, substance abuse and suicidal ideas.   Allergic/Immunologic: Negative for hives and persistent infections.         Vitals:    07/06/22 0851   BP: 144/82   BP Location: Left arm   Patient Position: Sitting   Cuff Size: Adult   Pulse: 61   SpO2: 99%   Weight: 117 kg (258 lb)   Height: 182.9 cm (72\")     Physical Exam  Vitals and nursing note reviewed.   Constitutional:       Appearance: Normal appearance.   HENT:      Head: Normocephalic.      Nose: Nose normal.      Mouth/Throat:      Mouth: Mucous membranes are moist.   Eyes:      Pupils: Pupils are equal, round, and reactive to light.   Cardiovascular:      Rate and Rhythm: Normal rate.      Pulses: Normal pulses.   Pulmonary:      Effort: Pulmonary effort is normal.   Abdominal:      General: Abdomen is flat.      Palpations: Abdomen is soft.   Musculoskeletal:         General: Normal range of motion.      Cervical back: Normal range of motion.      Right lower leg: No edema.      Left lower leg: No edema.   Skin:     General: Skin is warm and dry.      Capillary Refill: Capillary refill takes less than 2 seconds.   Neurological:      General: No focal deficit present.      Mental Status: He is alert and oriented to person, place, and time.   Psychiatric:         Mood and Affect: Mood normal.         Assessment & Plan     Independent Review of Studies  7/2022 LLE Venous: Negative for DVT    1. Acute saddle pulmonary embolism without acute cor pulmonale (HCC)  1st event provoked in the setting of COVID pneumonia    Preliminary " hypercoagulable evaluation with factor V, factor II, homocysteine negative.  Completion of lupus anticoagulant, antiphospholipid syndrome, antithrombin III, and protein C/S studies to be performed after discontinuation of anticoagulation.     Completed 6 months anticoagulation repeat CTA chest will call with results.     2. Acute deep vein thrombosis (DVT) of popliteal vein of left lower extremity (HCC)  Resolved    3. Encounter for anticoagulation discussion and counseling  Adverse bleeding events that require evaluation includes blood in the urine, stool, gums, and nose.  If you are to experience these symptoms you should present to the heart and vascular center for evaluation.  Avoid NSAID's such as ibuprofen and naproxen for pain relief as these medications increase your risk of gastrointestinal bleeding.  Over the counter supplements such as garlic, gingko biloba, and other herbs may increase bleeding risks.         Detailed discussion regarding risks, benefits, and treatment plan. Images independently reviewed. Patient understands, agrees, and wishes to proceed with plan.       This document has been electronically signed by Juan Lebron AGACNP-BC [place]  On July 6, 2022 14:08 TICO

## 2022-07-06 NOTE — PATIENT INSTRUCTIONS
Obtain CTA chest to check for residual pulmonary emboli, will call with results.     Continue xarelto until called.

## 2022-07-08 ENCOUNTER — TRANSCRIBE ORDERS (OUTPATIENT)
Dept: GENERAL RADIOLOGY | Facility: HOSPITAL | Age: 67
End: 2022-07-08

## 2022-07-08 DIAGNOSIS — I26.92 ACUTE SADDLE PULMONARY EMBOLISM WITHOUT ACUTE COR PULMONALE: Primary | ICD-10-CM

## 2022-07-14 ENCOUNTER — LAB (OUTPATIENT)
Dept: LAB | Facility: HOSPITAL | Age: 67
End: 2022-07-14

## 2022-07-14 ENCOUNTER — HOSPITAL ENCOUNTER (OUTPATIENT)
Dept: CT IMAGING | Facility: HOSPITAL | Age: 67
Discharge: HOME | End: 2022-07-14

## 2022-07-14 DIAGNOSIS — I26.92 ACUTE SADDLE PULMONARY EMBOLISM WITHOUT ACUTE COR PULMONALE: ICD-10-CM

## 2022-07-14 LAB
BUN SERPL-MCNC: 15 MG/DL (ref 8–23)
CREAT SERPL-MCNC: 0.93 MG/DL (ref 0.76–1.27)
EGFRCR SERPLBLD CKD-EPI 2021: 91.1 ML/MIN/1.73

## 2022-07-14 PROCEDURE — 71275 CT ANGIOGRAPHY CHEST: CPT

## 2022-07-14 PROCEDURE — 84520 ASSAY OF UREA NITROGEN: CPT

## 2022-07-14 PROCEDURE — 82565 ASSAY OF CREATININE: CPT

## 2022-07-14 PROCEDURE — 36415 COLL VENOUS BLD VENIPUNCTURE: CPT

## 2022-07-14 PROCEDURE — 0 IOPAMIDOL PER 1 ML: Performed by: NURSE PRACTITIONER

## 2022-07-14 RX ADMIN — IOPAMIDOL 77 ML: 755 INJECTION, SOLUTION INTRAVENOUS at 15:29

## 2022-07-18 DIAGNOSIS — I82.90 BLOOD CLOT IN VEIN: ICD-10-CM

## 2022-07-18 DIAGNOSIS — I26.92 ACUTE SADDLE PULMONARY EMBOLISM WITHOUT ACUTE COR PULMONALE: Primary | ICD-10-CM

## 2022-09-05 ENCOUNTER — APPOINTMENT (OUTPATIENT)
Dept: CT IMAGING | Facility: HOSPITAL | Age: 67
End: 2022-09-05

## 2022-09-05 ENCOUNTER — HOSPITAL ENCOUNTER (EMERGENCY)
Facility: HOSPITAL | Age: 67
Discharge: HOME | End: 2022-09-05
Attending: EMERGENCY MEDICINE | Admitting: EMERGENCY MEDICINE

## 2022-09-05 VITALS
HEIGHT: 72 IN | SYSTOLIC BLOOD PRESSURE: 172 MMHG | OXYGEN SATURATION: 99 % | BODY MASS INDEX: 33.05 KG/M2 | TEMPERATURE: 98.5 F | RESPIRATION RATE: 18 BRPM | HEART RATE: 54 BPM | WEIGHT: 244 LBS | DIASTOLIC BLOOD PRESSURE: 92 MMHG

## 2022-09-05 DIAGNOSIS — R06.02 SHORTNESS OF BREATH: ICD-10-CM

## 2022-09-05 DIAGNOSIS — G89.29 CHRONIC RIGHT SHOULDER PAIN: ICD-10-CM

## 2022-09-05 DIAGNOSIS — M25.511 CHRONIC RIGHT SHOULDER PAIN: ICD-10-CM

## 2022-09-05 DIAGNOSIS — K57.92 DIVERTICULITIS: Primary | ICD-10-CM

## 2022-09-05 DIAGNOSIS — R10.30 LOWER ABDOMINAL PAIN: ICD-10-CM

## 2022-09-05 LAB
ALBUMIN SERPL-MCNC: 4 G/DL (ref 3.5–5.2)
ALBUMIN/GLOB SERPL: 1.5 G/DL
ALP SERPL-CCNC: 129 U/L (ref 39–117)
ALT SERPL W P-5'-P-CCNC: 64 U/L (ref 1–41)
ANION GAP SERPL CALCULATED.3IONS-SCNC: 8 MMOL/L (ref 5–15)
AST SERPL-CCNC: 52 U/L (ref 1–40)
BASOPHILS # BLD AUTO: 0.04 10*3/MM3 (ref 0–0.2)
BASOPHILS NFR BLD AUTO: 0.7 % (ref 0–1.5)
BILIRUB SERPL-MCNC: 0.7 MG/DL (ref 0–1.2)
BILIRUB UR QL STRIP: NEGATIVE
BUN SERPL-MCNC: 11 MG/DL (ref 8–23)
BUN/CREAT SERPL: 13.9 (ref 7–25)
CALCIUM SPEC-SCNC: 8.8 MG/DL (ref 8.6–10.5)
CHLORIDE SERPL-SCNC: 109 MMOL/L (ref 98–107)
CLARITY UR: CLEAR
CO2 SERPL-SCNC: 30 MMOL/L (ref 22–29)
COLOR UR: YELLOW
CREAT SERPL-MCNC: 0.79 MG/DL (ref 0.76–1.27)
D-LACTATE SERPL-SCNC: 0.9 MMOL/L (ref 0.5–2)
DEPRECATED RDW RBC AUTO: 48.8 FL (ref 37–54)
EGFRCR SERPLBLD CKD-EPI 2021: 98.6 ML/MIN/1.73
EOSINOPHIL # BLD AUTO: 0.08 10*3/MM3 (ref 0–0.4)
EOSINOPHIL NFR BLD AUTO: 1.5 % (ref 0.3–6.2)
ERYTHROCYTE [DISTWIDTH] IN BLOOD BY AUTOMATED COUNT: 12.8 % (ref 12.3–15.4)
GLOBULIN UR ELPH-MCNC: 2.7 GM/DL
GLUCOSE SERPL-MCNC: 126 MG/DL (ref 65–99)
GLUCOSE UR STRIP-MCNC: NEGATIVE MG/DL
HCT VFR BLD AUTO: 46.4 % (ref 37.5–51)
HGB BLD-MCNC: 15 G/DL (ref 13–17.7)
HGB UR QL STRIP.AUTO: NEGATIVE
HOLD SPECIMEN: NORMAL
HOLD SPECIMEN: NORMAL
IMM GRANULOCYTES # BLD AUTO: 0.01 10*3/MM3 (ref 0–0.05)
IMM GRANULOCYTES NFR BLD AUTO: 0.2 % (ref 0–0.5)
KETONES UR QL STRIP: NEGATIVE
LEUKOCYTE ESTERASE UR QL STRIP.AUTO: NEGATIVE
LIPASE SERPL-CCNC: 38 U/L (ref 13–60)
LYMPHOCYTES # BLD AUTO: 1.95 10*3/MM3 (ref 0.7–3.1)
LYMPHOCYTES NFR BLD AUTO: 35.8 % (ref 19.6–45.3)
MCH RBC QN AUTO: 33.1 PG (ref 26.6–33)
MCHC RBC AUTO-ENTMCNC: 32.3 G/DL (ref 31.5–35.7)
MCV RBC AUTO: 102.4 FL (ref 79–97)
MONOCYTES # BLD AUTO: 0.56 10*3/MM3 (ref 0.1–0.9)
MONOCYTES NFR BLD AUTO: 10.3 % (ref 5–12)
NEUTROPHILS NFR BLD AUTO: 2.81 10*3/MM3 (ref 1.7–7)
NEUTROPHILS NFR BLD AUTO: 51.5 % (ref 42.7–76)
NITRITE UR QL STRIP: NEGATIVE
NRBC BLD AUTO-RTO: 0 /100 WBC (ref 0–0.2)
PH UR STRIP.AUTO: 7.5 [PH] (ref 5–9)
PLATELET # BLD AUTO: 168 10*3/MM3 (ref 140–450)
PMV BLD AUTO: 10.4 FL (ref 6–12)
POTASSIUM SERPL-SCNC: 4.3 MMOL/L (ref 3.5–5.2)
PROT SERPL-MCNC: 6.7 G/DL (ref 6–8.5)
PROT UR QL STRIP: NEGATIVE
RBC # BLD AUTO: 4.53 10*6/MM3 (ref 4.14–5.8)
SODIUM SERPL-SCNC: 147 MMOL/L (ref 136–145)
SP GR UR STRIP: 1.02 (ref 1–1.03)
UROBILINOGEN UR QL STRIP: NORMAL
WBC NRBC COR # BLD: 5.45 10*3/MM3 (ref 3.4–10.8)
WHOLE BLOOD HOLD COAG: NORMAL
WHOLE BLOOD HOLD SPECIMEN: NORMAL

## 2022-09-05 PROCEDURE — 85025 COMPLETE CBC W/AUTO DIFF WBC: CPT | Performed by: EMERGENCY MEDICINE

## 2022-09-05 PROCEDURE — 83690 ASSAY OF LIPASE: CPT | Performed by: EMERGENCY MEDICINE

## 2022-09-05 PROCEDURE — 96360 HYDRATION IV INFUSION INIT: CPT

## 2022-09-05 PROCEDURE — 80053 COMPREHEN METABOLIC PANEL: CPT | Performed by: EMERGENCY MEDICINE

## 2022-09-05 PROCEDURE — 83605 ASSAY OF LACTIC ACID: CPT | Performed by: EMERGENCY MEDICINE

## 2022-09-05 PROCEDURE — 99284 EMERGENCY DEPT VISIT MOD MDM: CPT

## 2022-09-05 PROCEDURE — 93005 ELECTROCARDIOGRAM TRACING: CPT

## 2022-09-05 PROCEDURE — 96361 HYDRATE IV INFUSION ADD-ON: CPT

## 2022-09-05 PROCEDURE — 71275 CT ANGIOGRAPHY CHEST: CPT

## 2022-09-05 PROCEDURE — 81003 URINALYSIS AUTO W/O SCOPE: CPT | Performed by: EMERGENCY MEDICINE

## 2022-09-05 PROCEDURE — 93010 ELECTROCARDIOGRAM REPORT: CPT | Performed by: INTERNAL MEDICINE

## 2022-09-05 PROCEDURE — 0 IOPAMIDOL PER 1 ML: Performed by: EMERGENCY MEDICINE

## 2022-09-05 PROCEDURE — 74177 CT ABD & PELVIS W/CONTRAST: CPT

## 2022-09-05 PROCEDURE — 93005 ELECTROCARDIOGRAM TRACING: CPT | Performed by: EMERGENCY MEDICINE

## 2022-09-05 RX ORDER — SODIUM CHLORIDE 0.9 % (FLUSH) 0.9 %
10 SYRINGE (ML) INJECTION AS NEEDED
Status: DISCONTINUED | OUTPATIENT
Start: 2022-09-05 | End: 2022-09-05 | Stop reason: HOSPADM

## 2022-09-05 RX ORDER — METRONIDAZOLE 500 MG/1
500 TABLET ORAL 2 TIMES DAILY
Qty: 14 TABLET | Refills: 0 | Status: SHIPPED | OUTPATIENT
Start: 2022-09-05 | End: 2022-10-11

## 2022-09-05 RX ORDER — METRONIDAZOLE 500 MG/1
500 TABLET ORAL ONCE
Status: COMPLETED | OUTPATIENT
Start: 2022-09-05 | End: 2022-09-05

## 2022-09-05 RX ORDER — HYDROCODONE BITARTRATE AND ACETAMINOPHEN 5; 325 MG/1; MG/1
1 TABLET ORAL EVERY 6 HOURS PRN
Qty: 15 TABLET | Refills: 0 | Status: SHIPPED | OUTPATIENT
Start: 2022-09-05 | End: 2022-10-11

## 2022-09-05 RX ORDER — LEVOFLOXACIN 500 MG/1
500 TABLET, FILM COATED ORAL ONCE
Status: COMPLETED | OUTPATIENT
Start: 2022-09-05 | End: 2022-09-05

## 2022-09-05 RX ORDER — SODIUM CHLORIDE 9 MG/ML
125 INJECTION, SOLUTION INTRAVENOUS CONTINUOUS
Status: DISCONTINUED | OUTPATIENT
Start: 2022-09-05 | End: 2022-09-05 | Stop reason: HOSPADM

## 2022-09-05 RX ORDER — CIPROFLOXACIN 500 MG/1
500 TABLET, FILM COATED ORAL 2 TIMES DAILY
Qty: 14 TABLET | Refills: 0 | Status: SHIPPED | OUTPATIENT
Start: 2022-09-05 | End: 2022-10-11

## 2022-09-05 RX ADMIN — SODIUM CHLORIDE 125 ML/HR: 9 INJECTION, SOLUTION INTRAVENOUS at 14:19

## 2022-09-05 RX ADMIN — LEVOFLOXACIN 500 MG: 500 TABLET, FILM COATED ORAL at 16:57

## 2022-09-05 RX ADMIN — SODIUM CHLORIDE 500 ML: 9 INJECTION, SOLUTION INTRAVENOUS at 14:19

## 2022-09-05 RX ADMIN — METRONIDAZOLE 500 MG: 500 TABLET ORAL at 16:57

## 2022-09-05 RX ADMIN — IOPAMIDOL 90 ML: 755 INJECTION, SOLUTION INTRAVENOUS at 14:52

## 2022-09-05 NOTE — ED PROVIDER NOTES
Subjective   Patient presents emergency department with complaints of left flank pain and shortness of breath.  Patient has significant history of PE in the last 6 months.  Patient is currently not on coagulation.  This was evidently a large saddle embolus.  This was evidently connected with his COVID-19 infection.  Patient has been off anticoagulation for some time.  Patient noted more wheezing and shortness of breath yesterday upon having a fall.  This is evidently a mechanical fall where he fell and hit his left back.  Patient states that he is had this for some time acute on chronic.  Patient states he has brought this up with his primary care physician several times but evidently it has not been addressed.  Patient arrives with normal-appearing vital signs save some minimal bradycardia.          Review of Systems   Constitutional: Positive for activity change and fatigue. Negative for appetite change, chills and fever.   HENT: Negative.  Negative for congestion.    Eyes: Negative.  Negative for photophobia and visual disturbance.   Respiratory: Positive for shortness of breath. Negative for cough and chest tightness.    Cardiovascular: Negative.  Negative for chest pain and palpitations.   Gastrointestinal: Negative.  Negative for abdominal pain, constipation, diarrhea, nausea and vomiting.   Endocrine: Negative.    Genitourinary: Negative.  Negative for decreased urine volume, dysuria, flank pain and hematuria.   Musculoskeletal: Positive for back pain. Negative for arthralgias, myalgias, neck pain and neck stiffness.   Skin: Negative.  Negative for pallor.   Neurological: Negative.  Negative for dizziness, syncope, weakness, light-headedness, numbness and headaches.   Psychiatric/Behavioral: Negative.  Negative for confusion and suicidal ideas. The patient is not nervous/anxious.    All other systems reviewed and are negative.      Past Medical History:   Diagnosis Date   • BPH (benign prostatic hyperplasia)     • Diabetes mellitus (HCC)    • Hypercholesterolemia    • Hyperlipidemia        Allergies   Allergen Reactions   • Celecoxib Confusion   • Corticosteroids Other (See Comments)     Pt states caused convulsions   • Penicillins Confusion       Past Surgical History:   Procedure Laterality Date   • COLONOSCOPY N/A 1/10/2022   • JOINT REPLACEMENT     • KNEE ARTHROSCOPY Left    • TOTAL KNEE ARTHROPLASTY Right        Family History   Problem Relation Age of Onset   • Hypertension Mother    • Hypertension Father        Social History     Socioeconomic History   • Marital status: Single   Tobacco Use   • Smoking status: Former Smoker     Types: Cigarettes   • Smokeless tobacco: Never Used   Vaping Use   • Vaping Use: Never used   Substance and Sexual Activity   • Alcohol use: Yes   • Drug use: Never   • Sexual activity: Not Currently           Objective   Physical Exam  Vitals and nursing note reviewed.   Constitutional:       Appearance: He is well-developed.   HENT:      Head: Normocephalic.      Mouth/Throat:      Mouth: Mucous membranes are moist.      Pharynx: Oropharynx is clear.   Eyes:      Extraocular Movements: Extraocular movements intact.      Pupils: Pupils are equal, round, and reactive to light.   Cardiovascular:      Rate and Rhythm: Normal rate.   Pulmonary:      Effort: Pulmonary effort is normal. No tachypnea, accessory muscle usage or respiratory distress.      Breath sounds: Normal breath sounds. No decreased breath sounds or wheezing.   Abdominal:      Palpations: Abdomen is soft.   Musculoskeletal:         General: Normal range of motion.      Cervical back: Neck supple.      Comments: Tenderness in the left flank area this is below the CVA on the left.  There is no bruising, no ecchymosis, no soft tissue swelling is noted.  No bony abnormalities in the region noted.  No crepitus or step-off.   Skin:     General: Skin is warm and dry.      Capillary Refill: Capillary refill takes less than 2 seconds.    Neurological:      General: No focal deficit present.      Mental Status: He is alert.   Psychiatric:         Mood and Affect: Mood normal.         Behavior: Behavior normal.         Procedures           ED Course  ED Course as of 09/05/22 1633   Mon Sep 05, 2022   1632 Patient with nonsurgical abdomen.  CT findings consistent with diverticulitis.  Patient informed of the same.  We will place the patient on antibiotics, pain control for the short-term with primary care follow-up.  Plan outpatient follow-up of his shoulder pain on the right.  No acute issues there. [PC]      ED Course User Index  [PC] Rony Newberry MD                                          Labs Reviewed   COMPREHENSIVE METABOLIC PANEL - Abnormal; Notable for the following components:       Result Value    Glucose 126 (*)     Sodium 147 (*)     Chloride 109 (*)     CO2 30.0 (*)     ALT (SGPT) 64 (*)     AST (SGOT) 52 (*)     Alkaline Phosphatase 129 (*)     All other components within normal limits    Narrative:     GFR Normal >60  Chronic Kidney Disease <60  Kidney Failure <15     CBC WITH AUTO DIFFERENTIAL - Abnormal; Notable for the following components:    .4 (*)     MCH 33.1 (*)     All other components within normal limits   LIPASE - Normal   URINALYSIS W/ MICROSCOPIC IF INDICATED (NO CULTURE) - Normal    Narrative:     Urine microscopic not indicated.   LACTIC ACID, PLASMA - Normal   RAINBOW DRAW    Narrative:     The following orders were created for panel order [place] Draw.  Procedure                               Abnormality         Status                     ---------                               -----------         ------                     Green Top (Gel)[[id number]]                                  Final result               Lavender Top[[id number]]                                     Final result               Gold Top - SST[[id number]]                                   Final result               Light Blue Top[[id number]]                                    Final result                 Please view results for these tests on the individual orders.   CBC AND DIFFERENTIAL    Narrative:     The following orders were created for panel order CBC & Differential.  Procedure                               Abnormality         Status                     ---------                               -----------         ------                     CBC Auto Differential[[id number]]        Abnormal            Final result                 Please view results for these tests on the individual orders.   GREEN TOP   LAVENDER TOP   GOLD TOP - SST   LIGHT BLUE TOP       CT Angiogram Chest   Final Result   No evidence of pulmonary embolism.   Mild infiltrate versus atelectasis in the left lower lobe   posteriorly.       Electronically signed by:  Stuart Mcclendon MD  9/5/2022 3:54 PM CDT   Workstation: [id number]      CT Abdomen Pelvis With Contrast   Final Result   Mild proximal sigmoid diverticulitis with no gross abscess.     Mild infiltrate versus atelectasis in the left lower lobe.    Small hiatal hernia.   Gallstones in the dependent portion of the gallbladder..    Appendix is normal.      Electronically signed by:  Stuart Mcclendon MD  9/5/2022 3:59 PM CDT   Workstation: [id number]            [facility]    Final diagnoses:   Diverticulitis   Lower abdominal pain   Shortness of breath   Chronic right shoulder pain       ED Disposition  ED Disposition     ED Disposition   Discharge    Condition   Stable    Comment   --             Gita Barnard, APRN  [address] [address]  [phone]      As needed, For further evaluation and management    [facility]  [address]  [place] [address]  [phone]  Schedule an appointment as soon as possible for a visit   For further evaluation and management of the shoulder problem         Medication List      New Prescriptions    ciprofloxacin 500 MG  tablet  Commonly known as: CIPRO  Take 1 tablet by mouth 2 (Two) Times a Day.     HYDROcodone-acetaminophen 5-325 MG per tablet  Commonly known as: NORCO  Take 1 tablet by mouth Every 6 (Six) Hours As Needed for Severe Pain.     metroNIDAZOLE 500 MG tablet  Commonly known as: FLAGYL  Take 1 tablet by mouth 2 (Two) Times a Day.           Where to Get Your Medications      These medications were sent to [place], [place] - [facility] [phone] [facility] [phone] [address] [address]    Phone: [phone]   · ciprofloxacin 500 MG tablet  · HYDROcodone-acetaminophen 5-325 MG per tablet  · metroNIDAZOLE 500 MG tablet          Rony Newberry MD  09/05/22 1156

## 2022-10-11 ENCOUNTER — OFFICE VISIT (OUTPATIENT)
Dept: ORTHOPEDIC SURGERY | Facility: CLINIC | Age: 67
End: 2022-10-11

## 2022-10-11 VITALS — HEIGHT: 72 IN | WEIGHT: 244 LBS | BODY MASS INDEX: 33.05 KG/M2

## 2022-10-11 DIAGNOSIS — G89.29 CHRONIC RIGHT SHOULDER PAIN: Primary | ICD-10-CM

## 2022-10-11 DIAGNOSIS — M25.511 CHRONIC RIGHT SHOULDER PAIN: Primary | ICD-10-CM

## 2022-10-11 DIAGNOSIS — G89.29 CHRONIC LEFT SHOULDER PAIN: ICD-10-CM

## 2022-10-11 DIAGNOSIS — M19.011 PRIMARY OSTEOARTHRITIS OF RIGHT SHOULDER: ICD-10-CM

## 2022-10-11 DIAGNOSIS — M19.012 PRIMARY OSTEOARTHRITIS OF LEFT SHOULDER: ICD-10-CM

## 2022-10-11 DIAGNOSIS — M25.511 ACUTE PAIN OF RIGHT SHOULDER: Primary | ICD-10-CM

## 2022-10-11 DIAGNOSIS — M25.512 CHRONIC LEFT SHOULDER PAIN: ICD-10-CM

## 2022-10-11 PROBLEM — K80.20 GALLSTONE: Status: ACTIVE | Noted: 2022-01-05

## 2022-10-11 PROBLEM — K76.0 STEATOSIS OF LIVER: Status: ACTIVE | Noted: 2022-01-05

## 2022-10-11 PROCEDURE — 99214 OFFICE O/P EST MOD 30 MIN: CPT | Performed by: NURSE PRACTITIONER

## 2022-10-11 RX ORDER — ALBUTEROL SULFATE 90 UG/1
2 AEROSOL, METERED RESPIRATORY (INHALATION)
COMMUNITY
Start: 2022-07-06

## 2022-10-11 RX ORDER — LOVASTATIN 40 MG/1
40 TABLET ORAL
COMMUNITY
Start: 2022-07-06

## 2022-10-11 RX ORDER — MELOXICAM 7.5 MG/1
7.5 TABLET ORAL DAILY
Qty: 30 TABLET | Refills: 1 | Status: SHIPPED | OUTPATIENT
Start: 2022-10-11 | End: 2022-10-11

## 2022-10-11 RX ORDER — DIPHENOXYLATE HYDROCHLORIDE AND ATROPINE SULFATE 2.5; .025 MG/1; MG/1
1 TABLET ORAL DAILY
COMMUNITY

## 2022-10-11 NOTE — PROGRESS NOTES
Scott Scanlon is a 66 y.o. male   Primary provider:  Gita Barnard APRN       Chief Complaint   Patient presents with   • Right Shoulder - Pain, Initial Evaluation   • Left Shoulder - Initial Evaluation, Pain       HISTORY OF PRESENT ILLNESS: This 66-year-old male patient presents today with complaints of bilateral shoulder pain.  The patient reports his pain has been ongoing for years.  The patient reports he has followed up with [facility] in the past when x-rays were performed.  The last x-ray performed was on the left in 2013 when the patient saw Dr. Morrissey.  The patient denies recent injury.  The patient denies numbness or tingling.  Patient reports his pain is severe, constant with grinding, popping and limited mobility.  Patient reports sleeping makes his pain worse and lifting overhead.  During the visit, the patient reports he saw Dr. Vang several years ago when he received a steroid injection and had convulsions.  Patient has tried modified activity and rest with minimal relief.  The patient reports he is a retired .  The patient is right-hand dominant.  Sent for new x-ray upon arrival.    Arm Pain   The pain is present in the right shoulder and left shoulder. Quality: grinding  The pain is at a severity of 9/10. The pain is severe. The pain has been constant since the incident. Associated symptoms comments: Clicking, popping, snapping   . Exacerbated by: sleeping         CONCURRENT MEDICAL HISTORY:    Past Medical History:   Diagnosis Date   • BPH (benign prostatic hyperplasia)    • Diabetes mellitus (HCC)    • Hypercholesterolemia    • Hyperlipidemia        Allergies   Allergen Reactions   • Celecoxib Confusion   • Corticosteroids Other (See Comments)     Pt states caused convulsions   • Penicillins Confusion         Current Outpatient Medications:   •  albuterol sulfate  (90 Base) MCG/ACT inhaler, Inhale 2 puffs 4 (Four) Times a Day., Disp: 18 g, Rfl: 1  •  albuterol sulfate HFA  "108 (90 Base) MCG/ACT inhaler, Inhale 2 puffs., Disp: , Rfl:   •  aspirin 81 MG chewable tablet, Chew 1 tablet Daily., Disp: , Rfl:   •  Blood Glucose Monitoring Suppl kit, See Admin Instructions., Disp: , Rfl:   •  cholecalciferol (VITAMIN D3) 1.25 MG (99343 UT) capsule, Take 1 capsule by mouth 1 (One) Time Per Week., Disp: , Rfl:   •  lovastatin (MEVACOR) 40 MG tablet, Take 1 tablet by mouth every night at bedtime., Disp: , Rfl:   •  metFORMIN (GLUCOPHAGE) 1000 MG tablet, Take 1 tablet by mouth., Disp: , Rfl:   •  multivitamin (THERAGRAN) tablet tablet, Take 1 tablet by mouth Daily., Disp: , Rfl:   •  tamsulosin (FLOMAX) 0.4 MG capsule 24 hr capsule, Take 1 capsule by mouth Daily., Disp: , Rfl:   •  rivaroxaban (XARELTO) 20 MG tablet, Take 1 tablet by mouth., Disp: , Rfl:     Past Surgical History:   Procedure Laterality Date   • COLONOSCOPY N/A 1/10/2022   • JOINT REPLACEMENT     • KNEE ARTHROSCOPY Left    • TOTAL KNEE ARTHROPLASTY Right        Family History   Problem Relation Age of Onset   • Hypertension Mother    • Hypertension Father         Social History     Socioeconomic History   • Marital status: Single   Tobacco Use   • Smoking status: Former     Years: 30.00     Types: Cigarettes   • Smokeless tobacco: Never   Vaping Use   • Vaping Use: Never used   Substance and Sexual Activity   • Alcohol use: Yes   • Drug use: Never   • Sexual activity: Not Currently        Review of Systems   HENT: Positive for postnasal drip.    Eyes: Positive for pain.   Respiratory: Positive for cough and wheezing.    Musculoskeletal:        Muscle pain            PHYSICAL EXAMINATION:       Ht 182.9 cm (72\")   Wt 111 kg (244 lb)   BMI 33.09 kg/m²     Physical Exam    GAIT:     [x]  Normal  []  Antalgic    Assistive device: [x]  None  []  Walker     []  Crutches  []  Cane     []  Wheelchair  []  Stretcher    Right Shoulder Exam     Tenderness   The patient is experiencing tenderness in the acromioclavicular joint and " acromion.    Range of Motion   Active abduction: abnormal   Passive abduction: abnormal   Extension: abnormal   External rotation: abnormal   Forward flexion: abnormal     Tests   Apprehension: positive  Cross arm: positive  Impingement: positive    Other   Erythema: absent  Scars: absent  Sensation: normal  Pulse: present    Comments:  Decreased strength and range of motion.  Pain elicited at approximately 90 degrees abduction and flexion.  Good elbow wrist and finger mobility.  No joint effusion.  Neurovascular intact.      Left Shoulder Exam     Tenderness   The patient is experiencing tenderness in the acromion.    Range of Motion   Active abduction: abnormal   Passive abduction: abnormal   Extension: abnormal   External rotation: abnormal   Forward flexion: abnormal     Tests   Apprehension: positive  Cross arm: positive  Impingement: positive    Other   Erythema: absent  Scars: absent  Sensation: normal  Pulse: present     Comments:  Decreased strength and range of motion.  Pain elicited at approximately 60 degrees abduction and flexion.  Good elbow wrist and finger mobility noted.  No joint effusion.  Neurovascular intact.                   XR Shoulder 2+ View Left    Result Date: 10/11/2022  Narrative: Three view left shoulder HISTORY: Left shoulder pain AP films with the humerus in internal and external rotation and scapular Y view were obtained. COMPARISON: May 10, 2013 FINDINGS: No fracture or dislocation. Hypertrophic change acromioclavicular joint. Degenerative changes cervical spine and thoracic spine. No other osseous or articular abnormality.     Impression: CONCLUSION: Hypertrophic change acromioclavicular joint. [id number] Electronically signed by:  Haseeb Lamas MD  10/11/2022 11:38 AM CDT Workstation: [id number]    XR Shoulder 2+ View Right    Result Date: 10/11/2022  Narrative: Three view right shoulder HISTORY: Right shoulder pain AP films with the humerus in internal and external rotation and scapular  Y view were obtained. COMPARISON: None FINDINGS: No fracture or dislocation. Hypertrophic change acromioclavicular joint. Minimal calcification coracoclavicular ligament adjacent to the coracoid process. Degenerative changes cervical spine and thoracic spine. No other osseous or articular abnormality.     Impression: CONCLUSION: Hypertrophic change acromioclavicular joint. Minimal calcification coracoclavicular ligament adjacent to the coracoid process. [id number] Electronically signed by:  Haseeb Lamas MD  10/11/2022 11:36 AM CDT Workstation: [id number]        ASSESSMENT:    Diagnoses and all orders for this visit:    Chronic right shoulder pain  -     Ambulatory Referral to Physical Therapy Evaluate and treat  -     Discontinue: meloxicam (Mobic) 7.5 MG tablet; Take 1 tablet by mouth Daily.    Chronic left shoulder pain  -     XR Shoulder 2+ View Left  -     Ambulatory Referral to Physical Therapy Evaluate and treat  -     Discontinue: meloxicam (Mobic) 7.5 MG tablet; Take 1 tablet by mouth Daily.    Primary osteoarthritis of right shoulder  -     Ambulatory Referral to Physical Therapy Evaluate and treat  -     Discontinue: meloxicam (Mobic) 7.5 MG tablet; Take 1 tablet by mouth Daily.    Primary osteoarthritis of left shoulder  -     Ambulatory Referral to Physical Therapy Evaluate and treat  -     Discontinue: meloxicam (Mobic) 7.5 MG tablet; Take 1 tablet by mouth Daily.      PLAN  Sent for x-ray of bilateral shoulders.  Reviewed with the patient.  The patient has hypertrophied AC joint with significant narrowing to bilateral shoulders.  There is also degenerative changes noted in the cervical and thoracic spine.  Discussed conservative measures with patient including intra-articular steroid injection, NSAIDs, modified activity and physical therapy.  Unfortunately, the patient is allergic to corticosteroids, NSAIDs are contraindicated because the patient has Xarelto which is a blood thinner listed on his medication  list.  Provided the patient with topical analgesics he may use that may help with his pain.  Advised the patient not to use simultaneously as this may cause skin irritation or rash.  Recommended physical therapy for range of motion and strengthening.  Referral sent to sports medicine.  Advised patient to follow-up in 4 to 6 weeks.  May follow-up sooner as needed if symptoms change or worsen.  Discussed with patient since his conservative treatment options are limited, he may need to follow-up with one of the surgeons.  Patient verbalized understanding.    All questions and concerns are addressed with understanding noted. They are aware and are in agreement to this plan.    Return in about 4 weeks (around 11/8/2022) for Recheck.    LIBBY López     This document has been electronically signed by LIBBY López on October 11, 2022 12:37 CDT      EMR Dragon/Transciption Disclaimer: Some of this note may be an electronic transcription/translation of spoken language to printed text using the Dragon Dictation System.     Time spent of a minimum of 30 minutes including the face to face evaluation, reviewing of medical history and prior medial records, reviewing of diagnostic studies,  documentation, patient education and coordination of care.

## 2022-11-13 LAB
QT INTERVAL: 454 MS
QTC INTERVAL: 445 MS

## 2022-12-28 ENCOUNTER — HOSPITAL ENCOUNTER (EMERGENCY)
Facility: HOSPITAL | Age: 67
Discharge: HOME | End: 2022-12-28
Attending: STUDENT IN AN ORGANIZED HEALTH CARE EDUCATION/TRAINING PROGRAM | Admitting: STUDENT IN AN ORGANIZED HEALTH CARE EDUCATION/TRAINING PROGRAM
Payer: MEDICARE

## 2022-12-28 VITALS
RESPIRATION RATE: 20 BRPM | BODY MASS INDEX: 34.4 KG/M2 | HEART RATE: 105 BPM | TEMPERATURE: 98.6 F | OXYGEN SATURATION: 94 % | WEIGHT: 254 LBS | SYSTOLIC BLOOD PRESSURE: 127 MMHG | HEIGHT: 72 IN | DIASTOLIC BLOOD PRESSURE: 77 MMHG

## 2022-12-28 DIAGNOSIS — K92.1 HEMATOCHEZIA: Primary | ICD-10-CM

## 2022-12-28 LAB
ABO GROUP BLD: NORMAL
ALBUMIN SERPL-MCNC: 3.7 G/DL (ref 3.5–5.2)
ALBUMIN/GLOB SERPL: 1.3 G/DL
ALP SERPL-CCNC: 114 U/L (ref 39–117)
ALT SERPL W P-5'-P-CCNC: 45 U/L (ref 1–41)
ANION GAP SERPL CALCULATED.3IONS-SCNC: 15 MMOL/L (ref 5–15)
AST SERPL-CCNC: 34 U/L (ref 1–40)
BACTERIA UR QL AUTO: ABNORMAL /HPF
BASOPHILS # BLD AUTO: 0.05 10*3/MM3 (ref 0–0.2)
BASOPHILS NFR BLD AUTO: 0.4 % (ref 0–1.5)
BILIRUB SERPL-MCNC: 0.7 MG/DL (ref 0–1.2)
BILIRUB UR QL STRIP: ABNORMAL
BLD GP AB SCN SERPL QL: NEGATIVE
BUN SERPL-MCNC: 17 MG/DL (ref 8–23)
BUN/CREAT SERPL: 12.6 (ref 7–25)
CALCIUM SPEC-SCNC: 9.2 MG/DL (ref 8.6–10.5)
CHLORIDE SERPL-SCNC: 100 MMOL/L (ref 98–107)
CLARITY UR: ABNORMAL
CO2 SERPL-SCNC: 22 MMOL/L (ref 22–29)
COLOR UR: ABNORMAL
CREAT SERPL-MCNC: 1.35 MG/DL (ref 0.76–1.27)
DEPRECATED RDW RBC AUTO: 44.9 FL (ref 37–54)
EGFRCR SERPLBLD CKD-EPI 2021: 57.9 ML/MIN/1.73
EOSINOPHIL # BLD AUTO: 0.01 10*3/MM3 (ref 0–0.4)
EOSINOPHIL NFR BLD AUTO: 0.1 % (ref 0.3–6.2)
ERYTHROCYTE [DISTWIDTH] IN BLOOD BY AUTOMATED COUNT: 12.5 % (ref 12.3–15.4)
GLOBULIN UR ELPH-MCNC: 2.8 GM/DL
GLUCOSE SERPL-MCNC: 235 MG/DL (ref 65–99)
GLUCOSE UR STRIP-MCNC: ABNORMAL MG/DL
HCT VFR BLD AUTO: 41.4 % (ref 37.5–51)
HGB BLD-MCNC: 13.5 G/DL (ref 13–17.7)
HGB UR QL STRIP.AUTO: NEGATIVE
HOLD SPECIMEN: NORMAL
HYALINE CASTS UR QL AUTO: ABNORMAL /LPF
IMM GRANULOCYTES # BLD AUTO: 0.04 10*3/MM3 (ref 0–0.05)
IMM GRANULOCYTES NFR BLD AUTO: 0.3 % (ref 0–0.5)
KETONES UR QL STRIP: ABNORMAL
LEUKOCYTE ESTERASE UR QL STRIP.AUTO: ABNORMAL
LIPASE SERPL-CCNC: 26 U/L (ref 13–60)
LYMPHOCYTES # BLD AUTO: 1.68 10*3/MM3 (ref 0.7–3.1)
LYMPHOCYTES NFR BLD AUTO: 14 % (ref 19.6–45.3)
Lab: NORMAL
MAGNESIUM SERPL-MCNC: 1.6 MG/DL (ref 1.6–2.4)
MCH RBC QN AUTO: 32.1 PG (ref 26.6–33)
MCHC RBC AUTO-ENTMCNC: 32.6 G/DL (ref 31.5–35.7)
MCV RBC AUTO: 98.6 FL (ref 79–97)
MONOCYTES # BLD AUTO: 0.68 10*3/MM3 (ref 0.1–0.9)
MONOCYTES NFR BLD AUTO: 5.7 % (ref 5–12)
NEUTROPHILS NFR BLD AUTO: 79.5 % (ref 42.7–76)
NEUTROPHILS NFR BLD AUTO: 9.53 10*3/MM3 (ref 1.7–7)
NITRITE UR QL STRIP: NEGATIVE
NRBC BLD AUTO-RTO: 0 /100 WBC (ref 0–0.2)
NT-PROBNP SERPL-MCNC: 109.4 PG/ML (ref 0–900)
PH UR STRIP.AUTO: <=5 [PH] (ref 5–9)
PLATELET # BLD AUTO: 153 10*3/MM3 (ref 140–450)
PMV BLD AUTO: 10.9 FL (ref 6–12)
POTASSIUM SERPL-SCNC: 4.4 MMOL/L (ref 3.5–5.2)
PROT SERPL-MCNC: 6.5 G/DL (ref 6–8.5)
PROT UR QL STRIP: ABNORMAL
RBC # BLD AUTO: 4.2 10*6/MM3 (ref 4.14–5.8)
RBC # UR STRIP: ABNORMAL /HPF
REF LAB TEST METHOD: ABNORMAL
RH BLD: POSITIVE
SODIUM SERPL-SCNC: 137 MMOL/L (ref 136–145)
SP GR UR STRIP: 1.03 (ref 1–1.03)
SQUAMOUS #/AREA URNS HPF: ABNORMAL /HPF
T&S EXPIRATION DATE: NORMAL
TROPONIN T SERPL-MCNC: <0.01 NG/ML (ref 0–0.03)
UROBILINOGEN UR QL STRIP: ABNORMAL
WBC # UR STRIP: ABNORMAL /HPF
WBC NRBC COR # BLD: 11.99 10*3/MM3 (ref 3.4–10.8)
WHOLE BLOOD HOLD COAG: NORMAL

## 2022-12-28 PROCEDURE — 86850 RBC ANTIBODY SCREEN: CPT | Performed by: STUDENT IN AN ORGANIZED HEALTH CARE EDUCATION/TRAINING PROGRAM

## 2022-12-28 PROCEDURE — 84484 ASSAY OF TROPONIN QUANT: CPT | Performed by: STUDENT IN AN ORGANIZED HEALTH CARE EDUCATION/TRAINING PROGRAM

## 2022-12-28 PROCEDURE — 81001 URINALYSIS AUTO W/SCOPE: CPT | Performed by: STUDENT IN AN ORGANIZED HEALTH CARE EDUCATION/TRAINING PROGRAM

## 2022-12-28 PROCEDURE — 86901 BLOOD TYPING SEROLOGIC RH(D): CPT | Performed by: STUDENT IN AN ORGANIZED HEALTH CARE EDUCATION/TRAINING PROGRAM

## 2022-12-28 PROCEDURE — 86900 BLOOD TYPING SEROLOGIC ABO: CPT | Performed by: STUDENT IN AN ORGANIZED HEALTH CARE EDUCATION/TRAINING PROGRAM

## 2022-12-28 PROCEDURE — 85025 COMPLETE CBC W/AUTO DIFF WBC: CPT | Performed by: STUDENT IN AN ORGANIZED HEALTH CARE EDUCATION/TRAINING PROGRAM

## 2022-12-28 PROCEDURE — 36415 COLL VENOUS BLD VENIPUNCTURE: CPT

## 2022-12-28 PROCEDURE — 93005 ELECTROCARDIOGRAM TRACING: CPT

## 2022-12-28 PROCEDURE — 93005 ELECTROCARDIOGRAM TRACING: CPT | Performed by: STUDENT IN AN ORGANIZED HEALTH CARE EDUCATION/TRAINING PROGRAM

## 2022-12-28 PROCEDURE — 93010 ELECTROCARDIOGRAM REPORT: CPT | Performed by: INTERNAL MEDICINE

## 2022-12-28 PROCEDURE — 99284 EMERGENCY DEPT VISIT MOD MDM: CPT

## 2022-12-28 PROCEDURE — 83690 ASSAY OF LIPASE: CPT | Performed by: STUDENT IN AN ORGANIZED HEALTH CARE EDUCATION/TRAINING PROGRAM

## 2022-12-28 PROCEDURE — 83880 ASSAY OF NATRIURETIC PEPTIDE: CPT | Performed by: STUDENT IN AN ORGANIZED HEALTH CARE EDUCATION/TRAINING PROGRAM

## 2022-12-28 PROCEDURE — 83735 ASSAY OF MAGNESIUM: CPT | Performed by: STUDENT IN AN ORGANIZED HEALTH CARE EDUCATION/TRAINING PROGRAM

## 2022-12-28 PROCEDURE — 80053 COMPREHEN METABOLIC PANEL: CPT | Performed by: STUDENT IN AN ORGANIZED HEALTH CARE EDUCATION/TRAINING PROGRAM

## 2022-12-28 RX ADMIN — SODIUM CHLORIDE, POTASSIUM CHLORIDE, SODIUM LACTATE AND CALCIUM CHLORIDE 1000 ML: 600; 310; 30; 20 INJECTION, SOLUTION INTRAVENOUS at 20:00

## 2022-12-29 LAB — HOLD SPECIMEN: NORMAL

## 2022-12-29 NOTE — ED PROVIDER NOTES
Subjective   History of Present Illness  67-year-old male comes to the ER with a 4-hour history of having bloody bowel movements.  He reports having 2 bowel movements that were red.  Patient is on Xarelto and aspirin.  Patient denies being constipated or having painful bowel movements.  He reports the stool was dark, but not black.  Denies having any pain.  Denies having other symptoms.    History provided by:  Patient and medical records   used: No        Review of Systems   Constitutional: Negative for chills and fever.   HENT: Negative for drooling.    Eyes: Negative for redness.   Respiratory: Negative for shortness of breath.    Cardiovascular: Negative for chest pain.   Gastrointestinal: Positive for blood in stool. Negative for abdominal pain, constipation, diarrhea, nausea and vomiting.   Genitourinary: Negative for flank pain.   Skin: Negative for color change.   Neurological: Negative for dizziness, seizures, weakness, light-headedness, numbness and headaches.   Psychiatric/Behavioral: Negative for confusion.       Past Medical History:   Diagnosis Date   • BPH (benign prostatic hyperplasia)    • Diabetes mellitus (HCC)    • Hypercholesterolemia    • Hyperlipidemia        Allergies   Allergen Reactions   • Celecoxib Confusion   • Corticosteroids Other (See Comments)     Pt states caused convulsions   • Penicillins Confusion       Past Surgical History:   Procedure Laterality Date   • COLONOSCOPY N/A 1/10/2022   • JOINT REPLACEMENT     • KNEE ARTHROSCOPY Left    • TOTAL KNEE ARTHROPLASTY Right        Family History   Problem Relation Age of Onset   • Hypertension Mother    • Hypertension Father        Social History     Socioeconomic History   • Marital status: Single   Tobacco Use   • Smoking status: Former     Years: 30.00     Types: Cigarettes   • Smokeless tobacco: Never   Vaping Use   • Vaping Use: Never used   Substance and Sexual Activity   • Alcohol use: Yes   • Drug use: Never    • Sexual activity: Defer           Objective    Vitals:    12/28/22 1920 12/28/22 1930 12/28/22 2026 12/28/22 2029   BP:  131/70 134/72 124/64   Pulse:  110 96 96   Resp:  20 20 20   Temp: 98.6 °F (37 °C)      TempSrc: Oral      SpO2:  97% 98% 98%   Weight:       Height:           Physical Exam  Vitals and nursing note reviewed.   Constitutional:       General: He is not in acute distress.     Appearance: He is well-developed. He is not ill-appearing, toxic-appearing or diaphoretic.   HENT:      Head: Normocephalic.      Right Ear: External ear normal.      Left Ear: External ear normal.      Nose: No congestion.   Eyes:      Conjunctiva/sclera: Conjunctivae normal.   Pulmonary:      Effort: Pulmonary effort is normal. No accessory muscle usage or respiratory distress.   Chest:      Chest wall: No tenderness.   Abdominal:      Palpations: Abdomen is soft.      Tenderness: There is no abdominal tenderness (deep palpation). There is no guarding or rebound.   Skin:     General: Skin is warm and dry.      Capillary Refill: Capillary refill takes less than 2 seconds.   Neurological:      Mental Status: He is alert and oriented to person, place, and time.   Psychiatric:         Behavior: Behavior normal.         Procedures           ED Course      Results for orders placed or performed during the hospital encounter of 12/28/22   Comprehensive Metabolic Panel    Specimen: Blood   Result Value Ref Range    Glucose 235 (H) 65 - 99 mg/dL    BUN 17 8 - 23 mg/dL    Creatinine 1.35 (H) 0.76 - 1.27 mg/dL    Sodium 137 136 - 145 mmol/L    Potassium 4.4 3.5 - 5.2 mmol/L    Chloride 100 98 - 107 mmol/L    CO2 22.0 22.0 - 29.0 mmol/L    Calcium 9.2 8.6 - 10.5 mg/dL    Total Protein 6.5 6.0 - 8.5 g/dL    Albumin 3.7 3.5 - 5.2 g/dL    ALT (SGPT) 45 (H) 1 - 41 U/L    AST (SGOT) 34 1 - 40 U/L    Alkaline Phosphatase 114 39 - 117 U/L    Total Bilirubin 0.7 0.0 - 1.2 mg/dL    Globulin 2.8 gm/dL    A/G Ratio 1.3 g/dL    BUN/Creatinine  Ratio 12.6 7.0 - 25.0    Anion Gap 15.0 5.0 - 15.0 mmol/L    eGFR 57.9 (L) >60.0 mL/min/1.73   Lipase    Specimen: Blood   Result Value Ref Range    Lipase 26 13 - 60 U/L   Urinalysis With Microscopic If Indicated (No Culture) - Urine, Clean Catch    Specimen: Urine, Clean Catch   Result Value Ref Range    Color, UA Orange (A) Yellow, Straw, Dark Yellow, Connie    Appearance, UA Cloudy (A) Clear    pH, UA <=5.0 5.0 - 9.0    Specific Gravity, UA 1.031 (H) 1.003 - 1.030    Glucose,  mg/dL (Trace) (A) Negative    Ketones, UA 15 mg/dL (1+) (A) Negative    Bilirubin, UA Small (1+) (A) Negative    Blood, UA Negative Negative    Protein, UA 30 mg/dL (1+) (A) Negative    Leuk Esterase, UA Trace (A) Negative    Nitrite, UA Negative Negative    Urobilinogen, UA 1.0 E.U./dL 0.2 - 1.0 E.U./dL   BNP    Specimen: Blood   Result Value Ref Range    proBNP 109.4 0.0 - 900.0 pg/mL   Troponin    Specimen: Blood   Result Value Ref Range    Troponin T <0.010 0.000 - 0.030 ng/mL   Magnesium    Specimen: Blood   Result Value Ref Range    Magnesium 1.6 1.6 - 2.4 mg/dL   CBC Auto Differential    Specimen: Blood   Result Value Ref Range    WBC 11.99 (H) 3.40 - 10.80 10*3/mm3    RBC 4.20 4.14 - 5.80 10*6/mm3    Hemoglobin 13.5 13.0 - 17.7 g/dL    Hematocrit 41.4 37.5 - 51.0 %    MCV 98.6 (H) 79.0 - 97.0 fL    MCH 32.1 26.6 - 33.0 pg    MCHC 32.6 31.5 - 35.7 g/dL    RDW 12.5 12.3 - 15.4 %    RDW-SD 44.9 37.0 - 54.0 fl    MPV 10.9 6.0 - 12.0 fL    Platelets 153 140 - 450 10*3/mm3    Neutrophil % 79.5 (H) 42.7 - 76.0 %    Lymphocyte % 14.0 (L) 19.6 - 45.3 %    Monocyte % 5.7 5.0 - 12.0 %    Eosinophil % 0.1 (L) 0.3 - 6.2 %    Basophil % 0.4 0.0 - 1.5 %    Immature Grans % 0.3 0.0 - 0.5 %    Neutrophils, Absolute 9.53 (H) 1.70 - 7.00 10*3/mm3    Lymphocytes, Absolute 1.68 0.70 - 3.10 10*3/mm3    Monocytes, Absolute 0.68 0.10 - 0.90 10*3/mm3    Eosinophils, Absolute 0.01 0.00 - 0.40 10*3/mm3    Basophils, Absolute 0.05 0.00 - 0.20 10*3/mm3     Immature Grans, Absolute 0.04 0.00 - 0.05 10*3/mm3    nRBC 0.0 0.0 - 0.2 /100 WBC   Urinalysis, Microscopic Only - Urine, Clean Catch    Specimen: Urine, Clean Catch   Result Value Ref Range    RBC, UA None Seen None Seen /HPF    WBC, UA 3-5 None Seen, 0-2, 3-5 /HPF    Bacteria, UA 1+ (A) None Seen /HPF    Squamous Epithelial Cells, UA 3-5 (A) None Seen, 0-2 /HPF    Hyaline Casts, UA Too Numerous to Count None Seen /LPF    Methodology Manual Light Microscopy    ECG 12 Lead Dyspnea   Result Value Ref Range    QT Interval 348 ms    QTC Interval 462 ms   Type & Screen    Specimen: Blood   Result Value Ref Range    ABO Type B     RH type Positive     Antibody Screen Negative     T&S Expiration Date 12/31/2022 11:59:59 PM    PREVIOUS HISTORY    Specimen: Blood   Result Value Ref Range    Previous History Previous Record on File              Medical Decision Making  Vital signs are stable, afebrile.  Labs significant for normal hemoglobin of 13.5.  Heart rate improved with IVF bolus.  No bloody bowel movements while in the ER.  Recommend follow-up with PCP and GI.  Strict return precautions given.  Patient states understanding and is agreeable to the plan.    Hematochezia: acute illness or injury  Amount and/or Complexity of Data Reviewed  Labs: ordered.  ECG/medicine tests: ordered.          Final diagnoses:   Hematochezia       ED Disposition  ED Disposition     ED Disposition   Discharge    Condition   Stable    Comment   --             Gita Barnard, APRN  [address] [address]  [phone]    Schedule an appointment as soon as possible for a visit in 2 days  ER follow up    [address] [address] [phone]  Schedule an appointment as soon as possible for a visit in 1 day  ER follow up         Medication List      No changes were made to your prescriptions during this visit.          Wiley Amin MD  12/28/22 [id number]

## 2022-12-29 NOTE — ED NOTES
Pt reports that around 1600 and 1630 he had two bowel movements. He reports his stool was dark and there was bright red blood present. He also reports he leaked bright red blood from his rectum onto the couch and did not realize he was leaking. He reports he has felt dizzy for a few days and began feeling weak yesterday. He appears SOA at this time.

## 2022-12-30 ENCOUNTER — OFFICE VISIT (OUTPATIENT)
Dept: GASTROENTEROLOGY | Facility: CLINIC | Age: 67
End: 2022-12-30
Payer: MEDICARE

## 2022-12-30 VITALS
DIASTOLIC BLOOD PRESSURE: 82 MMHG | HEART RATE: 84 BPM | HEIGHT: 72 IN | SYSTOLIC BLOOD PRESSURE: 120 MMHG | WEIGHT: 254.6 LBS | BODY MASS INDEX: 34.48 KG/M2

## 2022-12-30 DIAGNOSIS — K92.1 MELENA: ICD-10-CM

## 2022-12-30 DIAGNOSIS — R10.84 GENERALIZED ABDOMINAL PAIN: ICD-10-CM

## 2022-12-30 DIAGNOSIS — Z86.010 PERSONAL HISTORY OF COLONIC POLYPS: Primary | ICD-10-CM

## 2022-12-30 DIAGNOSIS — K62.5 RECTAL BLEEDING: ICD-10-CM

## 2022-12-30 PROCEDURE — 99204 OFFICE O/P NEW MOD 45 MIN: CPT | Performed by: INTERNAL MEDICINE

## 2022-12-30 RX ORDER — DEXTROSE AND SODIUM CHLORIDE 5; .45 G/100ML; G/100ML
30 INJECTION, SOLUTION INTRAVENOUS CONTINUOUS PRN
Status: CANCELLED | OUTPATIENT
Start: 2023-01-04

## 2022-12-30 RX ORDER — SODIUM CHLORIDE 9 MG/ML
40 INJECTION, SOLUTION INTRAVENOUS AS NEEDED
Status: CANCELLED | OUTPATIENT
Start: 2023-01-04

## 2023-01-01 LAB
QT INTERVAL: 348 MS
QTC INTERVAL: 462 MS

## 2023-01-03 PROBLEM — K92.1 MELENA: Status: ACTIVE | Noted: 2023-01-03

## 2023-01-03 PROBLEM — K62.5 RECTAL BLEEDING: Status: ACTIVE | Noted: 2023-01-03

## 2023-01-03 PROBLEM — R10.84 GENERALIZED ABDOMINAL PAIN: Status: ACTIVE | Noted: 2023-01-03

## 2023-01-03 RX ORDER — SODIUM, POTASSIUM,MAG SULFATES 17.5-3.13G
SOLUTION, RECONSTITUTED, ORAL ORAL
Qty: 354 ML | Refills: 0 | Status: SHIPPED | OUTPATIENT
Start: 2023-01-03 | End: 2023-02-21

## 2023-01-04 ENCOUNTER — ANESTHESIA EVENT (OUTPATIENT)
Dept: GASTROENTEROLOGY | Facility: HOSPITAL | Age: 68
End: 2023-01-04
Payer: MEDICARE

## 2023-01-04 ENCOUNTER — ANESTHESIA (OUTPATIENT)
Dept: GASTROENTEROLOGY | Facility: HOSPITAL | Age: 68
End: 2023-01-04
Payer: MEDICARE

## 2023-01-04 ENCOUNTER — HOSPITAL ENCOUNTER (OUTPATIENT)
Facility: HOSPITAL | Age: 68
Setting detail: HOSPITAL OUTPATIENT SURGERY
Discharge: HOME | End: 2023-01-04
Attending: INTERNAL MEDICINE | Admitting: INTERNAL MEDICINE
Payer: MEDICARE

## 2023-01-04 VITALS
BODY MASS INDEX: 34.43 KG/M2 | WEIGHT: 254.2 LBS | DIASTOLIC BLOOD PRESSURE: 59 MMHG | HEIGHT: 72 IN | TEMPERATURE: 97.5 F | RESPIRATION RATE: 16 BRPM | OXYGEN SATURATION: 98 % | HEART RATE: 75 BPM | SYSTOLIC BLOOD PRESSURE: 95 MMHG

## 2023-01-04 DIAGNOSIS — Z86.010 PERSONAL HISTORY OF COLONIC POLYPS: ICD-10-CM

## 2023-01-04 DIAGNOSIS — K92.1 MELENA: ICD-10-CM

## 2023-01-04 DIAGNOSIS — K62.5 RECTAL BLEEDING: ICD-10-CM

## 2023-01-04 DIAGNOSIS — R10.84 GENERALIZED ABDOMINAL PAIN: ICD-10-CM

## 2023-01-04 LAB — GLUCOSE BLDC GLUCOMTR-MCNC: 147 MG/DL (ref 70–130)

## 2023-01-04 PROCEDURE — 88305 TISSUE EXAM BY PATHOLOGIST: CPT

## 2023-01-04 PROCEDURE — 88342 IMHCHEM/IMCYTCHM 1ST ANTB: CPT

## 2023-01-04 PROCEDURE — 82962 GLUCOSE BLOOD TEST: CPT

## 2023-01-04 PROCEDURE — 45380 COLONOSCOPY AND BIOPSY: CPT | Performed by: INTERNAL MEDICINE

## 2023-01-04 PROCEDURE — 25010000002 PROPOFOL 10 MG/ML EMULSION: Performed by: NURSE ANESTHETIST, CERTIFIED REGISTERED

## 2023-01-04 PROCEDURE — 43239 EGD BIOPSY SINGLE/MULTIPLE: CPT | Performed by: INTERNAL MEDICINE

## 2023-01-04 RX ORDER — SUCRALFATE 1 G/1
1 TABLET ORAL 4 TIMES DAILY
Qty: 120 TABLET | Refills: 5 | Status: SHIPPED | OUTPATIENT
Start: 2023-01-04 | End: 2023-02-03

## 2023-01-04 RX ORDER — DEXTROSE AND SODIUM CHLORIDE 5; .45 G/100ML; G/100ML
30 INJECTION, SOLUTION INTRAVENOUS CONTINUOUS PRN
Status: DISCONTINUED | OUTPATIENT
Start: 2023-01-04 | End: 2023-01-04 | Stop reason: HOSPADM

## 2023-01-04 RX ORDER — SODIUM CHLORIDE 9 MG/ML
40 INJECTION, SOLUTION INTRAVENOUS AS NEEDED
Status: DISCONTINUED | OUTPATIENT
Start: 2023-01-04 | End: 2023-01-04 | Stop reason: HOSPADM

## 2023-01-04 RX ORDER — PROPOFOL 10 MG/ML
VIAL (ML) INTRAVENOUS AS NEEDED
Status: DISCONTINUED | OUTPATIENT
Start: 2023-01-04 | End: 2023-01-04 | Stop reason: SURG

## 2023-01-04 RX ADMIN — PROPOFOL 30 MG: 10 INJECTION, EMULSION INTRAVENOUS at 10:20

## 2023-01-04 RX ADMIN — PROPOFOL 30 MG: 10 INJECTION, EMULSION INTRAVENOUS at 10:30

## 2023-01-04 RX ADMIN — DEXTROSE AND SODIUM CHLORIDE 30 ML/HR: 5; 450 INJECTION, SOLUTION INTRAVENOUS at 09:54

## 2023-01-04 RX ADMIN — PROPOFOL 120 MG: 10 INJECTION, EMULSION INTRAVENOUS at 10:18

## 2023-01-04 RX ADMIN — PROPOFOL 30 MG: 10 INJECTION, EMULSION INTRAVENOUS at 10:22

## 2023-01-04 RX ADMIN — PROPOFOL 30 MG: 10 INJECTION, EMULSION INTRAVENOUS at 10:27

## 2023-01-04 RX ADMIN — PROPOFOL 30 MG: 10 INJECTION, EMULSION INTRAVENOUS at 10:24

## 2023-01-04 RX ADMIN — PROPOFOL 30 MG: 10 INJECTION, EMULSION INTRAVENOUS at 10:33

## 2023-01-04 RX ADMIN — PROPOFOL 30 MG: 10 INJECTION, EMULSION INTRAVENOUS at 10:19

## 2023-01-04 NOTE — ANESTHESIA PREPROCEDURE EVALUATION
Anesthesia Evaluation     NPO Solid Status: > 8 hours  NPO Liquid Status: > 2 hours           Airway   Mallampati: II  TM distance: >3 FB  Neck ROM: full  no difficulty expected  Dental    (+) upper dentures    Pulmonary - normal exam   (+) pneumonia , pulmonary embolism,   Cardiovascular - normal exam    (+) DVT, hyperlipidemia,       Neuro/Psych  GI/Hepatic/Renal/Endo    (+)  GI bleeding , liver disease, diabetes mellitus,     Musculoskeletal     Abdominal    Substance History      OB/GYN          Other   arthritis,                    Anesthesia Plan    ASA 3     general   total IV anesthesia  intravenous induction     Anesthetic plan, risks, benefits, and alternatives have been provided, discussed and informed consent has been obtained with: patient.        CODE STATUS:

## 2023-01-04 NOTE — H&P
Chief Complaint:   Chief Complaint   Patient presents with   • Hospital Follow Up Visit       Subjective     HPI:   Mr. Scanlon is a 67-year-old -American male with past medical history of prostate hyperplasia, diabetes mellitus, hypercholesterolemia, hyperlipidemia, joint replacement presented for evaluation for abdominal pain.  He has intermittent bouts of generalized abdominal pain with melena and rectal bleeding for past 2 days.  He was seen at the emergency room and was noted to have hemoglobin of 13.5.  Hemoglobin was 15, 3 months ago.  He denied nausea, vomiting, diarrhea, constipation, dysphagia or weight loss.  His last colonoscopy last year by Dr. Nicolas was consistent with 3 polyps, diverticulosis and hemorrhoids.  He also has mild elevation of ALT at 45 and LFTs are normal otherwise.  He takes aspirin daily and denied other NSAID usage.    Past Medical History:   Past Medical History:   Diagnosis Date   • BPH (benign prostatic hyperplasia)    • Diabetes mellitus (HCC)    • Hypercholesterolemia    • Hyperlipidemia        Past Surgical History:  Past Surgical History:   Procedure Laterality Date   • COLONOSCOPY N/A 1/10/2022   • JOINT REPLACEMENT     • KNEE ARTHROSCOPY Left    • TOTAL KNEE ARTHROPLASTY Right        Family History:  Family History   Problem Relation Age of Onset   • Hypertension Mother    • Hypertension Father        Social History:   reports that he has quit smoking. His smoking use included cigarettes. He has never used smokeless tobacco. He reports current alcohol use. He reports that he does not use drugs.    Medications:   Prior to Admission medications    Medication Sig Start Date End Date Taking? Authorizing Provider   albuterol sulfate  (90 Base) MCG/ACT inhaler Inhale 2 puffs 4 (Four) Times a Day. 1/29/22  Yes Aj Parks MD   albuterol sulfate  (90 Base) MCG/ACT inhaler Inhale 2 puffs. 7/6/22  Yes Provider, MD Glory   aspirin 81 MG chewable tablet Chew  1 tablet Daily. 1/29/22  Yes Aj Parks MD   Blood Glucose Monitoring Suppl kit See Admin Instructions. 2/2/22 2/3/23 Yes Glory Ramos MD   cholecalciferol (VITAMIN D3) 1.25 MG (39855 UT) capsule Take 1 capsule by mouth 1 (One) Time Per Week. 7/6/22  Yes Glory Ramos MD   lovastatin (MEVACOR) 40 MG tablet Take 1 tablet by mouth every night at bedtime. 7/6/22  Yes Glory Ramos MD   metFORMIN (GLUCOPHAGE) 1000 MG tablet Take 1 tablet by mouth. 7/6/22  Yes Glory Ramos MD   multivitamin (THERAGRAN) tablet tablet Take 1 tablet by mouth Daily.   Yes Glory Ramos MD   rivaroxaban (XARELTO) 20 MG tablet Take 1 tablet by mouth.   Yes Glory Ramos MD   tamsulosin (FLOMAX) 0.4 MG capsule 24 hr capsule Take 1 capsule by mouth Daily. 1/29/22  Yes Aj Parks MD   sodium-potassium-magnesium sulfates (Suprep Bowel Prep Kit) 17.5-3.13-1.6 GM/177ML solution oral solution Please use the instructions given in office 1/3/23   Jhonny King MD       Allergies:  Celecoxib, Corticosteroids, and Penicillins    ROS:    Review of Systems   Constitutional: Negative for chills, fatigue, fever and unexpected weight change.   HENT: Negative for congestion, ear discharge, hearing loss, nosebleeds and sore throat.    Eyes: Negative for pain, discharge and redness.   Respiratory: Negative for cough, chest tightness, shortness of breath and wheezing.    Cardiovascular: Negative for chest pain and palpitations.   Gastrointestinal: Positive for abdominal pain, anal bleeding and blood in stool. Negative for abdominal distention, constipation, diarrhea, nausea and vomiting.   Endocrine: Negative for cold intolerance, polydipsia, polyphagia and polyuria.   Genitourinary: Negative for dysuria, flank pain, frequency, hematuria and urgency.   Musculoskeletal: Negative for arthralgias, back pain, joint swelling and myalgias.   Skin: Negative for color change, pallor and rash.    Neurological: Negative for tremors, seizures, syncope, weakness and headaches.   Hematological: Negative for adenopathy. Does not bruise/bleed easily.   Psychiatric/Behavioral: Negative for behavioral problems, confusion, dysphoric mood, hallucinations and suicidal ideas. The patient is not nervous/anxious.      Objective     /82 (BP Location: Left arm)   Pulse 84   Ht 182.9 cm (72\")   Wt 115 kg (254 lb 9.6 oz)   BMI 34.53 kg/m²     Physical Exam  Constitutional:       Appearance: He is well-developed.   HENT:      Head: Normocephalic and atraumatic.   Eyes:      Conjunctiva/sclera: Conjunctivae normal.      Pupils: Pupils are equal, round, and reactive to light.   Neck:      Thyroid: No thyromegaly.   Cardiovascular:      Rate and Rhythm: Normal rate and regular rhythm.      Heart sounds: Normal heart sounds. No murmur heard.  Pulmonary:      Effort: Pulmonary effort is normal.      Breath sounds: Normal breath sounds. No wheezing.   Abdominal:      General: Bowel sounds are normal. There is no distension.      Palpations: Abdomen is soft. There is no mass.      Tenderness: There is no abdominal tenderness.      Hernia: No hernia is present.   Genitourinary:     Comments: No lesions noted  Musculoskeletal:         General: No tenderness. Normal range of motion.      Cervical back: Normal range of motion and neck supple.   Lymphadenopathy:      Cervical: No cervical adenopathy.   Skin:     General: Skin is warm and dry.      Findings: No rash.   Neurological:      Mental Status: He is alert and oriented to person, place, and time.      Cranial Nerves: No cranial nerve deficit.   Psychiatric:         Thought Content: Thought content normal.        Extremities: No edema, cyanosis or clubbing.    Assessment & Plan    1.  Abdominal pain with melena rule out peptic ulcer disease, gastritis and pancreatico biliary pathology. add Prilosec 40 mg p.o. daily.  Proceed with EGD for further evaluation.  2.   Abdominal pain with rectal bleeding rule out diverticular hemorrhage, hemorrhoidal bleeding and colitis.  Add Bentyl and Anusol.  Follow H&H closely.  Proceed with colonoscopy for further evaluation.  3.  Obesity, recommend exercise and weight control.  4.  Alcohol usage, recommend cessation.  Diagnoses and all orders for this visit:    1. Personal history of colonic polyps (Primary)  -     Case Request; Standing  -     Cancel: sodium chloride 0.9 % infusion 40 mL  -     Cancel: dextrose 5 % and sodium chloride 0.45 % infusion  -     Case Request    2. Generalized abdominal pain  -     Case Request; Standing  -     Cancel: sodium chloride 0.9 % infusion 40 mL  -     Cancel: dextrose 5 % and sodium chloride 0.45 % infusion  -     Case Request    3. Rectal bleeding  -     Case Request; Standing  -     Cancel: sodium chloride 0.9 % infusion 40 mL  -     Cancel: dextrose 5 % and sodium chloride 0.45 % infusion  -     Case Request    4. Melena  -     Case Request; Standing  -     Cancel: sodium chloride 0.9 % infusion 40 mL  -     Cancel: dextrose 5 % and sodium chloride 0.45 % infusion  -     Case Request    Other orders  -     Follow Anesthesia Guidelines / Protocol; Future  -     Obtain Informed Consent; Future  -     Cancel: Implement Anesthesia Orders Day of Procedure; Standing  -     Cancel: Obtain Informed Consent; Standing  -     Cancel: POC Glucose Once; Standing  -     Cancel: Insert Peripheral IV; Standing        ESOPHAGOGASTRODUODENOSCOPY (N/A), COLONOSCOPY (N/A)     Diagnosis Plan   1. Personal history of colonic polyps  Case Request    Case Request      2. Generalized abdominal pain  Case Request    Case Request      3. Rectal bleeding  Case Request    Case Request      4. Melena  Case Request    Case Request          Anticipated Surgical Procedure:  Orders Placed This Encounter   Procedures   • Obtain Informed Consent     Standing Status:   Future     Order Specific Question:   Informed Consent Given For      Answer:   egd and colonoscopy       The risks, benefits, and alternatives of this procedure have been discussed with the patient or the responsible party- the patient understands and agrees to proceed.

## 2023-01-04 NOTE — PROGRESS NOTES
[facility]      Chief Complaint:   Chief Complaint   Patient presents with   • Hospital Follow Up Visit       Subjective     HPI:   Mr. Scanlon is a 67-year-old -American male with past medical history of prostate hyperplasia, diabetes mellitus, hypercholesterolemia, hyperlipidemia, joint replacement presented for evaluation for abdominal pain.  He has intermittent bouts of generalized abdominal pain with melena and rectal bleeding for past 2 days.  He was seen at the emergency room and was noted to have hemoglobin of 13.5.  Hemoglobin was 15, 3 months ago.  He denied nausea, vomiting, diarrhea, constipation, dysphagia or weight loss.  His last colonoscopy last year by Dr. Nicolas was consistent with 3 polyps, diverticulosis and hemorrhoids.  He also has mild elevation of ALT at 45 and LFTs are normal otherwise.  He takes aspirin daily and denied other NSAID usage.    Past Medical History:   Past Medical History:   Diagnosis Date   • BPH (benign prostatic hyperplasia)    • Diabetes mellitus (HCC)    • Hypercholesterolemia    • Hyperlipidemia        Past Surgical History:  Past Surgical History:   Procedure Laterality Date   • COLONOSCOPY N/A 1/10/2022   • JOINT REPLACEMENT     • KNEE ARTHROSCOPY Left    • TOTAL KNEE ARTHROPLASTY Right        Family History:  Family History   Problem Relation Age of Onset   • Hypertension Mother    • Hypertension Father        Social History:   reports that he has quit smoking. His smoking use included cigarettes. He has never used smokeless tobacco. He reports current alcohol use. He reports that he does not use drugs.    Medications:   Prior to Admission medications    Medication Sig Start Date End Date Taking? Authorizing Provider   albuterol sulfate  (90 Base) MCG/ACT inhaler Inhale 2 puffs 4 (Four) Times a Day. 1/29/22  Yes Aj Parks MD   albuterol sulfate  (90 Base) MCG/ACT inhaler Inhale 2 puffs. 7/6/22  Yes Provider,  MD Glory   aspirin 81 MG chewable tablet Chew 1 tablet Daily. 1/29/22  Yes Aj Parks MD   Blood Glucose Monitoring Suppl kit See Admin Instructions. 2/2/22 2/3/23 Yes Glory Ramos MD   cholecalciferol (VITAMIN D3) 1.25 MG (02968 UT) capsule Take 1 capsule by mouth 1 (One) Time Per Week. 7/6/22  Yes Glory Ramos MD   lovastatin (MEVACOR) 40 MG tablet Take 1 tablet by mouth every night at bedtime. 7/6/22  Yes Glory Ramos MD   metFORMIN (GLUCOPHAGE) 1000 MG tablet Take 1 tablet by mouth. 7/6/22  Yes Glory Ramos MD   multivitamin (THERAGRAN) tablet tablet Take 1 tablet by mouth Daily.   Yes Glory Ramos MD   rivaroxaban (XARELTO) 20 MG tablet Take 1 tablet by mouth.   Yes Glory Ramos MD   tamsulosin (FLOMAX) 0.4 MG capsule 24 hr capsule Take 1 capsule by mouth Daily. 1/29/22  Yes Aj Parks MD   sodium-potassium-magnesium sulfates (Suprep Bowel Prep Kit) 17.5-3.13-1.6 GM/177ML solution oral solution Please use the instructions given in office 1/3/23   Jhonny King MD       Allergies:  Celecoxib, Corticosteroids, and Penicillins    ROS:    Review of Systems   Constitutional: Negative for chills, fatigue, fever and unexpected weight change.   HENT: Negative for congestion, ear discharge, hearing loss, nosebleeds and sore throat.    Eyes: Negative for pain, discharge and redness.   Respiratory: Negative for cough, chest tightness, shortness of breath and wheezing.    Cardiovascular: Negative for chest pain and palpitations.   Gastrointestinal: Positive for abdominal pain, anal bleeding and blood in stool. Negative for abdominal distention, constipation, diarrhea, nausea and vomiting.   Endocrine: Negative for cold intolerance, polydipsia, polyphagia and polyuria.   Genitourinary: Negative for dysuria, flank pain, frequency, hematuria and urgency.   Musculoskeletal: Negative for arthralgias, back pain, joint swelling and myalgias.   Skin:  "Negative for color change, pallor and rash.   Neurological: Negative for tremors, seizures, syncope, weakness and headaches.   Hematological: Negative for adenopathy. Does not bruise/bleed easily.   Psychiatric/Behavioral: Negative for behavioral problems, confusion, dysphoric mood, hallucinations and suicidal ideas. The patient is not nervous/anxious.      Objective     /82 (BP Location: Left arm)   Pulse 84   Ht 182.9 cm (72\")   Wt 115 kg (254 lb 9.6 oz)   BMI 34.53 kg/m²     Physical Exam  Constitutional:       Appearance: He is well-developed.   HENT:      Head: Normocephalic and atraumatic.   Eyes:      Conjunctiva/sclera: Conjunctivae normal.      Pupils: Pupils are equal, round, and reactive to light.   Neck:      Thyroid: No thyromegaly.   Cardiovascular:      Rate and Rhythm: Normal rate and regular rhythm.      Heart sounds: Normal heart sounds. No murmur heard.  Pulmonary:      Effort: Pulmonary effort is normal.      Breath sounds: Normal breath sounds. No wheezing.   Abdominal:      General: Bowel sounds are normal. There is no distension.      Palpations: Abdomen is soft. There is no mass.      Tenderness: There is no abdominal tenderness.      Hernia: No hernia is present.   Genitourinary:     Comments: No lesions noted  Musculoskeletal:         General: No tenderness. Normal range of motion.      Cervical back: Normal range of motion and neck supple.   Lymphadenopathy:      Cervical: No cervical adenopathy.   Skin:     General: Skin is warm and dry.      Findings: No rash.   Neurological:      Mental Status: He is alert and oriented to person, place, and time.      Cranial Nerves: No cranial nerve deficit.   Psychiatric:         Thought Content: Thought content normal.        Extremities: No edema, cyanosis or clubbing.    Assessment & Plan    1.  Abdominal pain with melena rule out peptic ulcer disease, gastritis and pancreatico biliary pathology. add Prilosec 40 mg p.o. daily.  Proceed " with EGD for further evaluation.  2.  Abdominal pain with rectal bleeding rule out diverticular hemorrhage, hemorrhoidal bleeding and colitis.  Add Bentyl and Anusol.  Follow H&H closely.  Proceed with colonoscopy for further evaluation.  3.  Obesity, recommend exercise and weight control.  4.  Alcohol usage, recommend cessation.  Diagnoses and all orders for this visit:    1. Personal history of colonic polyps (Primary)  -     Case Request; Standing  -     Cancel: sodium chloride 0.9 % infusion 40 mL  -     Cancel: dextrose 5 % and sodium chloride 0.45 % infusion  -     Case Request    2. Generalized abdominal pain  -     Case Request; Standing  -     Cancel: sodium chloride 0.9 % infusion 40 mL  -     Cancel: dextrose 5 % and sodium chloride 0.45 % infusion  -     Case Request    3. Rectal bleeding  -     Case Request; Standing  -     Cancel: sodium chloride 0.9 % infusion 40 mL  -     Cancel: dextrose 5 % and sodium chloride 0.45 % infusion  -     Case Request    4. Melena  -     Case Request; Standing  -     Cancel: sodium chloride 0.9 % infusion 40 mL  -     Cancel: dextrose 5 % and sodium chloride 0.45 % infusion  -     Case Request    Other orders  -     Follow Anesthesia Guidelines / Protocol; Future  -     Obtain Informed Consent; Future  -     Cancel: Implement Anesthesia Orders Day of Procedure; Standing  -     Cancel: Obtain Informed Consent; Standing  -     Cancel: POC Glucose Once; Standing  -     Cancel: Insert Peripheral IV; Standing        ESOPHAGOGASTRODUODENOSCOPY (N/A), COLONOSCOPY (N/A)     Diagnosis Plan   1. Personal history of colonic polyps  Case Request    Case Request      2. Generalized abdominal pain  Case Request    Case Request      3. Rectal bleeding  Case Request    Case Request      4. Melena  Case Request    Case Request          Anticipated Surgical Procedure:  Orders Placed This Encounter   Procedures   • Obtain Informed Consent     Standing Status:   Future     Order Specific  Question:   Informed Consent Given For     Answer:   egd and colonoscopy       The risks, benefits, and alternatives of this procedure have been discussed with the patient or the responsible party- the patient understands and agrees to proceed.            This document has been electronically signed by Jhonny King MD on January 4, 2023 10:15 CST

## 2023-01-04 NOTE — ANESTHESIA POSTPROCEDURE EVALUATION
Patient: Scott Scanlon    Procedure Summary     Date: 01/04/23 Room / Location: [facility] 3 / [facility]    Anesthesia Start: 1017 Anesthesia Stop: 1037    Procedures:       ESOPHAGOGASTRODUODENOSCOPY      COLONOSCOPY Diagnosis:       Personal history of colonic polyps      Generalized abdominal pain      Rectal bleeding      Melena      (Personal history of colonic polyps [Z86.010])      (Generalized abdominal pain [R10.84])      (Rectal bleeding [K62.5])      (Melena [K92.1])    Surgeons: Jhonny King MD Provider: Roberto Srinivasan CRNA    Anesthesia Type: general ASA Status: 3          Anesthesia Type: general    Vitals  No vitals data found for the desired time range.          Post Anesthesia Care and Evaluation    Patient location during evaluation: bedside  Patient participation: waiting for patient participation  Level of consciousness: responsive to verbal stimuli  Pain management: adequate    Airway patency: patent  Anesthetic complications: No anesthetic complications  PONV Status: none  Cardiovascular status: acceptable  Respiratory status: acceptable  Hydration status: acceptable    Comments: ---------------------------               01/04/23                      0940         ---------------------------   BP:          138/73         Pulse:         100          Resp:          18           Temp:   97.4 °F (36.3 °C)   SpO2:          98%         ---------------------------

## 2023-01-04 NOTE — DISCHARGE INSTRUCTIONS
Outpatient Instructions for Monitored Anesthesia Care (MAC)    1. You will be released from the hospital in the care of a responsible adult who should remain with you for at least 6 hours.    2. You are at an increased risk for falls following anesthesia. Use care when changing from a lying to a sitting position. Use your assistive devices ( example: cane, walker or family member).    3. You must NOT drive a car, climb high places such as a ladder, or operate equipment such as electric knives,stoves etc...for at least 12 hours. If you are dizzy for longer than 24 hours, notify your doctor.    4. DO NOT drink any alcoholic beverages for at least 24 hours. Anesthesia may impair your judgement.    5. If you smoke, do not smoke alone due to increased risk of burns/fires.    6. DO NOT undertake any legally binding commitment for at least 24 hours. Anesthesia may impair your judgement.

## 2023-01-06 LAB — REF LAB TEST METHOD: NORMAL

## 2023-01-19 ENCOUNTER — OFFICE VISIT (OUTPATIENT)
Dept: GASTROENTEROLOGY | Facility: CLINIC | Age: 68
End: 2023-01-19
Payer: MEDICARE

## 2023-01-19 VITALS
HEART RATE: 66 BPM | DIASTOLIC BLOOD PRESSURE: 91 MMHG | WEIGHT: 253.8 LBS | HEIGHT: 72 IN | BODY MASS INDEX: 34.38 KG/M2 | SYSTOLIC BLOOD PRESSURE: 151 MMHG

## 2023-01-19 DIAGNOSIS — Z86.010 PERSONAL HISTORY OF COLONIC POLYPS: Primary | ICD-10-CM

## 2023-01-19 DIAGNOSIS — K25.0 ACUTE GASTRIC ULCER WITH HEMORRHAGE: ICD-10-CM

## 2023-01-19 PROCEDURE — 99214 OFFICE O/P EST MOD 30 MIN: CPT | Performed by: INTERNAL MEDICINE

## 2023-01-19 RX ORDER — MELOXICAM 15 MG/1
15 TABLET ORAL DAILY
COMMUNITY
Start: 2023-01-03

## 2023-01-19 RX ORDER — LANSOPRAZOLE, AMOXICILLIN, CLARITHROMYCIN 30-500-500
KIT ORAL 2 TIMES DAILY
Qty: 14 EACH | Refills: 0 | Status: SHIPPED | OUTPATIENT
Start: 2023-01-19

## 2023-02-07 NOTE — PROGRESS NOTES
Chief Complaint   Patient presents with   • Endo follow up       Subjective    Scott Scanlon is a 67 y.o. male.    History of Present Illness  Patient presented to GI clinic for follow-up visit today.  Has intermittent bouts of back pain and meloxicam is not helping.  Abdominal pain has improved.  Denies nausea or vomiting.  Bowel movements are regular.  Weight is stable.  EGD was consistent with esophagitis and gastric ulcer.  Path was consistent with H. pylori gastritis.  Colonoscopy was consistent with that patchy erythema, diverticulosis, hemorrhoids and adenomatous colon polyp.       The following portions of the patient's history were reviewed and updated as appropriate:   Past Medical History:   Diagnosis Date   • BPH (benign prostatic hyperplasia)    • Diabetes mellitus (HCC)    • Hypercholesterolemia    • Hyperlipidemia      Past Surgical History:   Procedure Laterality Date   • COLONOSCOPY N/A 1/10/2022   • COLONOSCOPY N/A 1/4/2023    Procedure: COLONOSCOPY;  Surgeon: Jhonny King MD;  Location: [facility] ENDOSCOPY;  Service: Gastroenterology;  Laterality: N/A;   • ENDOSCOPY N/A 1/4/2023    Procedure: ESOPHAGOGASTRODUODENOSCOPY;  Surgeon: Jhonny King MD;  Location: [facility] ENDOSCOPY;  Service: Gastroenterology;  Laterality: N/A;   • JOINT REPLACEMENT     • KNEE ARTHROSCOPY Left    • TOTAL KNEE ARTHROPLASTY Right      Family History   Problem Relation Age of Onset   • Hypertension Mother    • Hypertension Father        Prior to Admission medications    Medication Sig Start Date End Date Taking? Authorizing Provider   albuterol sulfate  (90 Base) MCG/ACT inhaler Inhale 2 puffs 4 (Four) Times a Day. 1/29/22  Yes Aj Parks MD   albuterol sulfate  (90 Base) MCG/ACT inhaler Inhale 2 puffs. 7/6/22  Yes ProviderGlory MD   aspirin 81 MG chewable tablet Chew 1 tablet Daily. 1/29/22  Yes Aj Parks MD   cholecalciferol (VITAMIN D3) 1.25 MG (80392 UT) capsule Take 1  capsule by mouth 1 (One) Time Per Week. 7/6/22  Yes Glory Ramos MD   lovastatin (MEVACOR) 40 MG tablet Take 1 tablet by mouth every night at bedtime. 7/6/22  Yes Glory Ramos MD   metFORMIN (GLUCOPHAGE) 1000 MG tablet Take 1 tablet by mouth. 7/6/22  Yes Glory Ramos MD   multivitamin (THERAGRAN) tablet tablet Take 1 tablet by mouth Daily.   Yes Glory Ramos MD   tamsulosin (FLOMAX) 0.4 MG capsule 24 hr capsule Take 1 capsule by mouth Daily. 1/29/22  Yes Aj Parks MD   amoxicillin-clarithromycin-lansoprazole (Prevpac) combo pack Take  by mouth 2 (Two) Times a Day. Follow package directions. 1/19/23   Jhonny King MD   meloxicam (MOBIC) 15 MG tablet  1/3/23   ProviderGlory MD   sodium-potassium-magnesium sulfates (Suprep Bowel Prep Kit) 17.5-3.13-1.6 GM/177ML solution oral solution Please use the instructions given in office 1/3/23   Jhonny King MD     Allergies   Allergen Reactions   • Celecoxib Confusion   • Corticosteroids Other (See Comments)     Pt states caused convulsions   • Penicillins Confusion     Social History     Socioeconomic History   • Marital status: Single   Tobacco Use   • Smoking status: Former     Years: 30.00     Types: Cigarettes   • Smokeless tobacco: Never   Vaping Use   • Vaping Use: Never used   Substance and Sexual Activity   • Alcohol use: Yes   • Drug use: Never   • Sexual activity: Defer       Review of Systems  Review of Systems   Constitutional: Negative for chills, fatigue, fever and unexpected weight change.   HENT: Negative for congestion, ear discharge, hearing loss, nosebleeds and sore throat.    Eyes: Negative for pain, discharge and redness.   Respiratory: Negative for cough, chest tightness, shortness of breath and wheezing.    Cardiovascular: Negative for chest pain and palpitations.   Gastrointestinal: Negative for abdominal distention, abdominal pain, blood in stool, constipation, diarrhea, nausea and vomiting.  "  Endocrine: Negative for cold intolerance, polydipsia, polyphagia and polyuria.   Genitourinary: Negative for dysuria, flank pain, frequency, hematuria and urgency.   Musculoskeletal: Positive for back pain. Negative for arthralgias, joint swelling and myalgias.   Skin: Negative for color change, pallor and rash.   Neurological: Negative for tremors, seizures, syncope, weakness and headaches.   Hematological: Negative for adenopathy. Does not bruise/bleed easily.   Psychiatric/Behavioral: Negative for behavioral problems, confusion, dysphoric mood, hallucinations and suicidal ideas. The patient is not nervous/anxious.         /91   Pulse 66   Ht 182.9 cm (72\")   Wt 115 kg (253 lb 12.8 oz)   BMI 34.42 kg/m²     Objective    Physical Exam  Constitutional:       Appearance: He is well-developed.   HENT:      Head: Normocephalic and atraumatic.   Eyes:      Conjunctiva/sclera: Conjunctivae normal.      Pupils: Pupils are equal, round, and reactive to light.   Neck:      Thyroid: No thyromegaly.   Cardiovascular:      Rate and Rhythm: Normal rate and regular rhythm.      Heart sounds: Normal heart sounds. No murmur heard.  Pulmonary:      Effort: Pulmonary effort is normal.      Breath sounds: Normal breath sounds. No wheezing.   Abdominal:      General: Bowel sounds are normal. There is no distension.      Palpations: Abdomen is soft. There is no mass.      Tenderness: There is no abdominal tenderness.      Hernia: No hernia is present.   Genitourinary:     Comments: No lesions noted  Musculoskeletal:         General: No tenderness. Normal range of motion.      Cervical back: Normal range of motion and neck supple.   Lymphadenopathy:      Cervical: No cervical adenopathy.   Skin:     General: Skin is warm and dry.      Findings: No rash.   Neurological:      Mental Status: He is alert and oriented to person, place, and time.      Cranial Nerves: No cranial nerve deficit.   Psychiatric:         Thought " Content: Thought content normal.       Admission on 2023, Discharged on 2023   Component Date Value Ref Range Status   • Glucose 2023 147 (H)  70 - 130 mg/dL Final    : [id number] FINESSE Anaya ID: [id number]   • Reference Lab Report 2023    Final                    Value:[facility]  [address] [place] [address]  Phone: [phone] or [phone]  Fax: [phone]  Umberto Maynard M.D., Medical Director    PATIENT NAME                           LABORATORY NO.  [address]  KARINA MEZA.                [id number]  [id number]                         AGE              SEX  N           CLIENT REF #  [facility]           67      1955      [id number]   [id number]    [place]                       REQUESTING M.D.     ATTENDING MJose AlbertoD.     COPY TO[address]  DATE COLLECTED      DATE RECEIVED      DATE REPORTED  2023    DIAGNOSIS:  A.   DUODENUM, BIOPSY:  No significant histologic abnormality  B.   GASTRIC ANTRUM, BIOPSY:  Active chronic gastritis  Positive for Helicobacter pylori  C.   ESOPHAGUS, BIOPSY:  Benign squamous mucosa  D.   SIGMOID                           COLON BIOPSY:  No significant histologic abnormality  E.   SIGMOID COLON POLYP:  Tubular adenoma    JBS/sm    COMMENT:  I ordered H. pylori immunohistochemical (IHC) stain on block  B1 because a typical inflammatory infiltrate was present, and organisms could  not be confirmed on H&E staining.  H. pylori IHC stain is positive for  Helicobacter pylori.  Control tissue stains as expected.    CLINICAL HISTORY:  Personal history of colonic polyps, generalized abdominal pain, rectal bleeding.  melena    SPECIMENS RECEIVED:  A.  DUODENUM, BIOPSY  B.  GASTRIC ANTRUM, BIOPSY  C.  ESOPHAGUS, BIOPSY  D.  SIGMOID  "COLON BIOPSY  E.  SIGMOID COLON POLYP    MICROSCOPIC DESCRIPTION:  Tissue blocks are prepared and slides are examined microscopically on all  specimens. See diagnosis for details.    The internal and external (both positive and negative) controls reacted  appropriately. Some of our immunohistochemical and in situ hybridization  studies are performed as analyte specific reagents. The                           following statement  applies to those tests: This test was developed, and its performance  characteristics determined by [facility]. It has not been  cleared or approved by the US Food and Drug Administration. However, the  FDA has determined that approval and clearance are not necessary.    Professional interpretation rendered by Kishan Dumas M.D. at [facility],  [employer], [address] [place] [address].    GROSS DESCRIPTION:  A.  Specimen is received in 1 formalin filled container labeled \"duodenum  biopsy\" and consists of a single portion of tan soft tissue measuring 0.3 x  0.2 x 0.2 cm.  The specimen is submitted entirely in 1 cassette.  BW  B.  Specimen is received in 1 formalin filled container labeled \"gastric antrum  biopsy\" and consists of 2 portions of tan soft tissue measuring 0.4 x 0.3 x  0.2 cm.  The specimen is submitted entirely in 1 cassette.  C.  Specimen is received in 1 formalin filled container labeled \"esophagus  biopsy\" and consists of                           2 portions of gray soft tissue measuring 0.6 x 0.5 x  0.1 cm.  The specimen is submitted entirely in 1 cassette.  D.  Specimen is received in 1 formalin filled container labeled \"sigmoid colon  biopsy\" and consists of a single portion of tan soft tissue measuring 0.5 x  0.3 x 0.1 cm.  The specimen is submitted entirely in 1 cassette.  E.  Specimen is received in 1 formalin filled container labeled \"sigmoid colon  polyp cold biopsy\" and consists of a single portion of tan soft tissue  measuring 0.5 x 0.4 x " 0.2 cm.  The specimen is submitted entirely in 1  cassette.    REVIEWED, DIAGNOSED AND ELECTRONICALLY  SIGNED BY:    Kishan Dumas M.D.  CPT CODES:  88305x5, 88296       Assessment & Plan    No diagnosis found..   1.  Abdominal pain with melena, improved.  Continue Prilosec.  2.  H. pylori gastritis, add Prevpac for 2 weeks.  3.  Gastric ulcer, continue PPI.  Repeat EGD in 2 months to document healing.  4.  Abdominal pain with rectal bleeding, well controlled.  5.  Diverticulosis, add high-fiber diet.  6.  Colon polyp, repeat colonoscopy in 3 years.  7.  Obesity, recommend exercise and diet control.  8.  Back pain, recommend PCP evaluation.    Orders placed during this encounter include:  No orders of the defined types were placed in this encounter.      * Surgery not found *    Review and/or summary of lab tests, radiology, procedures, medications. Review and summary of old records and obtaining of history. The risks and benefits of my recommendations, as well as other treatment options were discussed with the patient today. Questions were answered.    New Medications Ordered This Visit   Medications   • amoxicillin-clarithromycin-lansoprazole (Prevpac) combo pack     Sig: Take  by mouth 2 (Two) Times a Day. Follow package directions.     Dispense:  14 each     Refill:  0       Follow-up: Return in about 1 month (around 2/19/2023).               Results for orders placed or performed during the hospital encounter of 01/04/23   TISSUE EXAM, [facility] ([facility])    Specimen: A: Small Intestine, Duodenum; Tissue    B: Gastric, Antrum; Tissue    C: Esophagus; Tissue    D: Large Intestine, Sigmoid Colon; Tissue    E: Large Intestine, Sigmoid Colon; Polyp   Result Value Ref Range    Reference Lab Report       [facility]  [address] [place] [address]  Phone: [phone] or [phone]  Fax: [phone]  Umberto Maynard M.D., Medical Director    PATIENT NAME                            LABORATORY NO.  [address]  KARINA MEZA.                [id number]  [id number]                         AGE              SEX  N           CLIENT REF #  [facility]           67      1955  IRA    [id number]   [id number]    [place]                       REQUESTING M.D.     ATTENDING M.D.     COPY TO.  [address] [place] [address]                 [place] [address]             [facility]  DATE COLLECTED      DATE RECEIVED      DATE REPORTED  2023    DIAGNOSIS:  A.   DUODENUM, BIOPSY:  No significant histologic abnormality  B.   GASTRIC ANTRUM, BIOPSY:  Active chronic gastritis  Positive for Helicobacter pylori  C.   ESOPHAGUS, BIOPSY:  Benign squamous mucosa  D.   SIGMOID  COLON BIOPSY:  No significant histologic abnormality  E.   SIGMOID COLON POLYP:  Tubular adenoma    SASHA/heriberto    COMMENT:  I ordered H. pylori immunohistochemical (IHC) stain on block  B1 because a typical inflammatory infiltrate was present, and organisms could  not be confirmed on H&E staining.  H. pylori IHC stain is positive for  Helicobacter pylori.  Control tissue stains as expected.    CLINICAL HISTORY:  Personal history of colonic polyps, generalized abdominal pain, rectal bleeding.  melena    SPECIMENS RECEIVED:  A.  DUODENUM, BIOPSY  B.  GASTRIC ANTRUM, BIOPSY  C.  ESOPHAGUS, BIOPSY  D.  SIGMOID COLON BIOPSY  E.  SIGMOID COLON POLYP    MICROSCOPIC DESCRIPTION:  Tissue blocks are prepared and slides are examined microscopically on all  specimens. See diagnosis for details.    The internal and external (both positive and negative) controls reacted  appropriately. Some of our immunohistochemical and in situ hybridization  studies are performed as analyte specific reagents. The  following statement  applies to those tests: This test was developed, and its performance  characteristics determined by [facility]. It has not  "been  cleared or approved by the US Food and Drug Administration. However, the  FDA has determined that approval and clearance are not necessary.    Professional interpretation rendered by Kishan Dumas M.D. at [facility], [address] [place] [address].    GROSS DESCRIPTION:  A.  Specimen is received in 1 formalin filled container labeled \"duodenum  biopsy\" and consists of a single portion of tan soft tissue measuring 0.3 x  0.2 x 0.2 cm.  The specimen is submitted entirely in 1 cassette.  BW  B.  Specimen is received in 1 formalin filled container labeled \"gastric antrum  biopsy\" and consists of 2 portions of tan soft tissue measuring 0.4 x 0.3 x  0.2 cm.  The specimen is submitted entirely in 1 cassette.  C.  Specimen is received in 1 formalin filled container labeled \"esophagus  biopsy\" and consists of  2 portions of gray soft tissue measuring 0.6 x 0.5 x  0.1 cm.  The specimen is submitted entirely in 1 cassette.  D.  Specimen is received in 1 formalin filled container labeled \"sigmoid colon  biopsy\" and consists of a single portion of tan soft tissue measuring 0.5 x  0.3 x 0.1 cm.  The specimen is submitted entirely in 1 cassette.  E.  Specimen is received in 1 formalin filled container labeled \"sigmoid colon  polyp cold biopsy\" and consists of a single portion of tan soft tissue  measuring 0.5 x 0.4 x 0.2 cm.  The specimen is submitted entirely in 1  cassette.    REVIEWED, DIAGNOSED AND ELECTRONICALLY  SIGNED BY:    Kishan Dumas M.D.  CPT CODES:  88305x5, 49314     POC Glucose Once    Specimen: Blood   Result Value Ref Range    Glucose 147 (H) 70 - 130 mg/dL   Results for orders placed or performed during the hospital encounter of 12/28/22   PREVIOUS HISTORY    Specimen: Blood   Result Value Ref Range    Previous History Previous Record on File    Gray Top   Result Value Ref Range    Extra Tube Hold for add-ons.    Gold Top - SST   Result Value Ref Range    Extra Tube Hold for " add-ons.    Urinalysis, Microscopic Only - Urine, Clean Catch    Specimen: Urine, Clean Catch   Result Value Ref Range    RBC, UA None Seen None Seen /HPF    WBC, UA 3-5 None Seen, 0-2, 3-5 /HPF    Bacteria, UA 1+ (A) None Seen /HPF    Squamous Epithelial Cells, UA 3-5 (A) None Seen, 0-2 /HPF    Hyaline Casts, UA Too Numerous to Count None Seen /LPF    Methodology Manual Light Microscopy    Urinalysis With Microscopic If Indicated (No Culture) - Urine, Clean Catch    Specimen: Urine, Clean Catch   Result Value Ref Range    Color, UA Orange (A) Yellow, Straw, Dark Yellow, Connie    Appearance, UA Cloudy (A) Clear    pH, UA <=5.0 5.0 - 9.0    Specific Gravity, UA 1.031 (H) 1.003 - 1.030    Glucose,  mg/dL (Trace) (A) Negative    Ketones, UA 15 mg/dL (1+) (A) Negative    Bilirubin, UA Small (1+) (A) Negative    Blood, UA Negative Negative    Protein, UA 30 mg/dL (1+) (A) Negative    Leuk Esterase, UA Trace (A) Negative    Nitrite, UA Negative Negative    Urobilinogen, UA 1.0 E.U./dL 0.2 - 1.0 E.U./dL   CBC Auto Differential    Specimen: Blood   Result Value Ref Range    WBC 11.99 (H) 3.40 - 10.80 10*3/mm3    RBC 4.20 4.14 - 5.80 10*6/mm3    Hemoglobin 13.5 13.0 - 17.7 g/dL    Hematocrit 41.4 37.5 - 51.0 %    MCV 98.6 (H) 79.0 - 97.0 fL    MCH 32.1 26.6 - 33.0 pg    MCHC 32.6 31.5 - 35.7 g/dL    RDW 12.5 12.3 - 15.4 %    RDW-SD 44.9 37.0 - 54.0 fl    MPV 10.9 6.0 - 12.0 fL    Platelets 153 140 - 450 10*3/mm3    Neutrophil % 79.5 (H) 42.7 - 76.0 %    Lymphocyte % 14.0 (L) 19.6 - 45.3 %    Monocyte % 5.7 5.0 - 12.0 %    Eosinophil % 0.1 (L) 0.3 - 6.2 %    Basophil % 0.4 0.0 - 1.5 %    Immature Grans % 0.3 0.0 - 0.5 %    Neutrophils, Absolute 9.53 (H) 1.70 - 7.00 10*3/mm3    Lymphocytes, Absolute 1.68 0.70 - 3.10 10*3/mm3    Monocytes, Absolute 0.68 0.10 - 0.90 10*3/mm3    Eosinophils, Absolute 0.01 0.00 - 0.40 10*3/mm3    Basophils, Absolute 0.05 0.00 - 0.20 10*3/mm3    Immature Grans, Absolute 0.04 0.00 - 0.05  10*3/mm3    nRBC 0.0 0.0 - 0.2 /100 WBC   Light Blue Top   Result Value Ref Range    Extra Tube Hold for add-ons.    Troponin    Specimen: Blood   Result Value Ref Range    Troponin T <0.010 0.000 - 0.030 ng/mL   Type & Screen    Specimen: Blood   Result Value Ref Range    ABO Type B     RH type Positive     Antibody Screen Negative     T&S Expiration Date 12/31/2022 11:59:59 PM    BNP    Specimen: Blood   Result Value Ref Range    proBNP 109.4 0.0 - 900.0 pg/mL   Magnesium    Specimen: Blood   Result Value Ref Range    Magnesium 1.6 1.6 - 2.4 mg/dL   Lipase    Specimen: Blood   Result Value Ref Range    Lipase 26 13 - 60 U/L   Comprehensive Metabolic Panel    Specimen: Blood   Result Value Ref Range    Glucose 235 (H) 65 - 99 mg/dL    BUN 17 8 - 23 mg/dL    Creatinine 1.35 (H) 0.76 - 1.27 mg/dL    Sodium 137 136 - 145 mmol/L    Potassium 4.4 3.5 - 5.2 mmol/L    Chloride 100 98 - 107 mmol/L    CO2 22.0 22.0 - 29.0 mmol/L    Calcium 9.2 8.6 - 10.5 mg/dL    Total Protein 6.5 6.0 - 8.5 g/dL    Albumin 3.7 3.5 - 5.2 g/dL    ALT (SGPT) 45 (H) 1 - 41 U/L    AST (SGOT) 34 1 - 40 U/L    Alkaline Phosphatase 114 39 - 117 U/L    Total Bilirubin 0.7 0.0 - 1.2 mg/dL    Globulin 2.8 gm/dL    A/G Ratio 1.3 g/dL    BUN/Creatinine Ratio 12.6 7.0 - 25.0    Anion Gap 15.0 5.0 - 15.0 mmol/L    eGFR 57.9 (L) >60.0 mL/min/1.73   ECG 12 Lead Dyspnea   Result Value Ref Range    QT Interval 348 ms    QTC Interval 462 ms   Results for orders placed or performed during the hospital encounter of 09/05/22   Gold Top - SST   Result Value Ref Range    Extra Tube Hold for add-ons.    Green Top (Gel)   Result Value Ref Range    Extra Tube Hold for add-ons.    Urinalysis With Microscopic If Indicated (No Culture) - Urine, Clean Catch    Specimen: Urine, Clean Catch   Result Value Ref Range    Color, UA Yellow Yellow, Straw, Dark Yellow, Connie    Appearance, UA Clear Clear    pH, UA 7.5 5.0 - 9.0    Specific Gravity, UA 1.025 1.003 - 1.030     Glucose, UA Negative Negative    Ketones, UA Negative Negative    Bilirubin, UA Negative Negative    Blood, UA Negative Negative    Protein, UA Negative Negative    Leuk Esterase, UA Negative Negative    Nitrite, UA Negative Negative    Urobilinogen, UA 1.0 E.U./dL 0.2 - 1.0 E.U./dL   CBC Auto Differential    Specimen: Blood   Result Value Ref Range    WBC 5.45 3.40 - 10.80 10*3/mm3    RBC 4.53 4.14 - 5.80 10*6/mm3    Hemoglobin 15.0 13.0 - 17.7 g/dL    Hematocrit 46.4 37.5 - 51.0 %    .4 (H) 79.0 - 97.0 fL    MCH 33.1 (H) 26.6 - 33.0 pg    MCHC 32.3 31.5 - 35.7 g/dL    RDW 12.8 12.3 - 15.4 %    RDW-SD 48.8 37.0 - 54.0 fl    MPV 10.4 6.0 - 12.0 fL    Platelets 168 140 - 450 10*3/mm3    Neutrophil % 51.5 42.7 - 76.0 %    Lymphocyte % 35.8 19.6 - 45.3 %    Monocyte % 10.3 5.0 - 12.0 %    Eosinophil % 1.5 0.3 - 6.2 %    Basophil % 0.7 0.0 - 1.5 %    Immature Grans % 0.2 0.0 - 0.5 %    Neutrophils, Absolute 2.81 1.70 - 7.00 10*3/mm3    Lymphocytes, Absolute 1.95 0.70 - 3.10 10*3/mm3    Monocytes, Absolute 0.56 0.10 - 0.90 10*3/mm3    Eosinophils, Absolute 0.08 0.00 - 0.40 10*3/mm3    Basophils, Absolute 0.04 0.00 - 0.20 10*3/mm3    Immature Grans, Absolute 0.01 0.00 - 0.05 10*3/mm3    nRBC 0.0 0.0 - 0.2 /100 WBC     *Note: Due to a large number of results and/or encounters for the requested time period, some results have not been displayed. A complete set of results can be found in Results Review.         This document has been electronically signed by Jhonny Knig MD on February 6, 2023 20:31 CST

## 2023-02-21 ENCOUNTER — OFFICE VISIT (OUTPATIENT)
Dept: GASTROENTEROLOGY | Facility: CLINIC | Age: 68
End: 2023-02-21
Payer: MEDICARE

## 2023-02-21 VITALS
WEIGHT: 253.6 LBS | HEART RATE: 67 BPM | SYSTOLIC BLOOD PRESSURE: 153 MMHG | HEIGHT: 72 IN | DIASTOLIC BLOOD PRESSURE: 93 MMHG | BODY MASS INDEX: 34.35 KG/M2

## 2023-02-21 DIAGNOSIS — K25.0 ACUTE GASTRIC ULCER WITH HEMORRHAGE: Primary | ICD-10-CM

## 2023-02-21 PROCEDURE — 99213 OFFICE O/P EST LOW 20 MIN: CPT | Performed by: INTERNAL MEDICINE

## 2023-02-21 RX ORDER — DEXTROSE AND SODIUM CHLORIDE 5; .45 G/100ML; G/100ML
30 INJECTION, SOLUTION INTRAVENOUS CONTINUOUS PRN
Status: CANCELLED | OUTPATIENT
Start: 2023-03-10

## 2023-02-21 RX ORDER — SODIUM CHLORIDE 9 MG/ML
40 INJECTION, SOLUTION INTRAVENOUS AS NEEDED
Status: CANCELLED | OUTPATIENT
Start: 2023-03-10

## 2023-02-21 RX ORDER — SUCRALFATE 1 G/1
1 TABLET ORAL
COMMUNITY
Start: 2023-02-07

## 2023-02-23 NOTE — PROGRESS NOTES
Chief Complaint   Patient presents with   • 1 month f/u        Subjective    Scott Scanlon is a 67 y.o. male.    History of Present Illness  Patient presented to GI clinic for follow-up visit today.  Feels better currently.  Denied abdominal pain, nausea or vomiting.  Bowel movements regular.  Weight is stable.  Due for EGD.  Most recent hemoglobin was 11.4.       The following portions of the patient's history were reviewed and updated as appropriate:   Past Medical History:   Diagnosis Date   • BPH (benign prostatic hyperplasia)    • Diabetes mellitus (HCC)    • Hypercholesterolemia    • Hyperlipidemia      Past Surgical History:   Procedure Laterality Date   • COLONOSCOPY N/A 1/10/2022   • COLONOSCOPY N/A 1/4/2023    Procedure: COLONOSCOPY;  Surgeon: Jhonny King MD;  Location: [facility] ENDOSCOPY;  Service: Gastroenterology;  Laterality: N/A;   • ENDOSCOPY N/A 1/4/2023    Procedure: ESOPHAGOGASTRODUODENOSCOPY;  Surgeon: Jhonny King MD;  Location: [facility] ENDOSCOPY;  Service: Gastroenterology;  Laterality: N/A;   • JOINT REPLACEMENT     • KNEE ARTHROSCOPY Left    • TOTAL KNEE ARTHROPLASTY Right      Family History   Problem Relation Age of Onset   • Hypertension Mother    • Hypertension Father        Prior to Admission medications    Medication Sig Start Date End Date Taking? Authorizing Provider   albuterol sulfate  (90 Base) MCG/ACT inhaler Inhale 2 puffs 4 (Four) Times a Day. 1/29/22  Yes Aj Parks MD   albuterol sulfate  (90 Base) MCG/ACT inhaler Inhale 2 puffs. 7/6/22  Yes ProviderGlory MD   amoxicillin-clarithromycin-lansoprazole (Prevpac) combo pack Take  by mouth 2 (Two) Times a Day. Follow package directions. 1/19/23  Yes Jhonny King MD   aspirin 81 MG chewable tablet Chew 1 tablet Daily. 1/29/22  Yes Aj Parks MD   cholecalciferol (VITAMIN D3) 1.25 MG (56514 UT) capsule Take 1 capsule by mouth 1 (One) Time Per Week. 7/6/22  Yes Richard  MD Glory   lovastatin (MEVACOR) 40 MG tablet Take 1 tablet by mouth every night at bedtime. 7/6/22  Yes Glory Ramos MD   meloxicam (MOBIC) 15 MG tablet  1/3/23  Yes Glory Ramos MD   metFORMIN (GLUCOPHAGE) 1000 MG tablet Take 1 tablet by mouth. 7/6/22  Yes Glory Ramos MD   multivitamin (THERAGRAN) tablet tablet Take 1 tablet by mouth Daily.   Yes Glory Ramos MD   tamsulosin (FLOMAX) 0.4 MG capsule 24 hr capsule Take 1 capsule by mouth Daily. 1/29/22  Yes Aj Parks MD   sucralfate (CARAFATE) 1 g tablet Take 1 g by mouth 4 (Four) Times a Day Before Meals & at Bedtime As Needed. 2/7/23   Glory Ramos MD     Allergies   Allergen Reactions   • Celecoxib Confusion   • Corticosteroids Other (See Comments)     Pt states caused convulsions   • Penicillins Confusion     Social History     Socioeconomic History   • Marital status: Single   Tobacco Use   • Smoking status: Former     Years: 30.00     Types: Cigarettes   • Smokeless tobacco: Never   Vaping Use   • Vaping Use: Never used   Substance and Sexual Activity   • Alcohol use: Yes   • Drug use: Never   • Sexual activity: Defer       Review of Systems  Review of Systems   Constitutional: Negative for chills, fatigue, fever and unexpected weight change.   HENT: Negative for congestion, ear discharge, hearing loss, nosebleeds and sore throat.    Eyes: Negative for pain, discharge and redness.   Respiratory: Negative for cough, chest tightness, shortness of breath and wheezing.    Cardiovascular: Negative for chest pain and palpitations.   Gastrointestinal: Negative for abdominal distention, abdominal pain, blood in stool, constipation, diarrhea, nausea and vomiting.   Endocrine: Negative for cold intolerance, polydipsia, polyphagia and polyuria.   Genitourinary: Negative for dysuria, flank pain, frequency, hematuria and urgency.   Musculoskeletal: Negative for arthralgias, back pain, joint swelling and myalgias.  "  Skin: Negative for color change, pallor and rash.   Neurological: Negative for tremors, seizures, syncope, weakness and headaches.   Hematological: Negative for adenopathy. Does not bruise/bleed easily.   Psychiatric/Behavioral: Negative for behavioral problems, confusion, dysphoric mood, hallucinations and suicidal ideas. The patient is not nervous/anxious.         /93 (BP Location: Right arm)   Pulse 67   Ht 182.9 cm (72\")   Wt 115 kg (253 lb 9.6 oz)   BMI 34.39 kg/m²     Objective    Physical Exam  Constitutional:       Appearance: He is well-developed.   HENT:      Head: Normocephalic and atraumatic.   Eyes:      Conjunctiva/sclera: Conjunctivae normal.      Pupils: Pupils are equal, round, and reactive to light.   Neck:      Thyroid: No thyromegaly.   Cardiovascular:      Rate and Rhythm: Normal rate and regular rhythm.      Heart sounds: Normal heart sounds. No murmur heard.  Pulmonary:      Effort: Pulmonary effort is normal.      Breath sounds: Normal breath sounds. No wheezing.   Abdominal:      General: Bowel sounds are normal. There is no distension.      Palpations: Abdomen is soft. There is no mass.      Tenderness: There is no abdominal tenderness.      Hernia: No hernia is present.   Genitourinary:     Comments: No lesions noted  Musculoskeletal:         General: No tenderness. Normal range of motion.      Cervical back: Normal range of motion and neck supple.   Lymphadenopathy:      Cervical: No cervical adenopathy.   Skin:     General: Skin is warm and dry.      Findings: No rash.   Neurological:      Mental Status: He is alert and oriented to person, place, and time.      Cranial Nerves: No cranial nerve deficit.   Psychiatric:         Thought Content: Thought content normal.       Admission on 01/04/2023, Discharged on 01/04/2023   Component Date Value Ref Range Status   • Glucose 01/04/2023 147 (H)  70 - 130 mg/dL Final    : [id number] FINESSE Jaclyn ID: [id number]   • " Reference Lab Report 2023    Final                    Value:[facility]  [address] [place] [address]  Phone: [phone] or [phone]  Fax: [phone]  Umberto Maynard M.D., Medical Director    PATIENT NAME                           LABORATORY NO.  [address]  KARINA MEZA.                [id number]  [id number]                         AGE              SEX  N           CLIENT REF #  [facility]           67      1955      [id number]   [id number]    [place]                       REQUESTING M.D.     ATTENDING MLUIS.     COPY TO[address]                 JOHN LAMB REGINA  [place], [place] [id number] [place] [address]  DATE COLLECTED      DATE RECEIVED      DATE REPORTED  2023    DIAGNOSIS:  A.   DUODENUM, BIOPSY:  No significant histologic abnormality  B.   GASTRIC ANTRUM, BIOPSY:  Active chronic gastritis  Positive for Helicobacter pylori  C.   ESOPHAGUS, BIOPSY:  Benign squamous mucosa  D.   SIGMOID                           COLON BIOPSY:  No significant histologic abnormality  E.   SIGMOID COLON POLYP:  Tubular adenoma    JBS/sm    COMMENT:  I ordered H. pylori immunohistochemical (IHC) stain on block  B1 because a typical inflammatory infiltrate was present, and organisms could  not be confirmed on H&E staining.  H. pylori IHC stain is positive for  Helicobacter pylori.  Control tissue stains as expected.    CLINICAL HISTORY:  Personal history of colonic polyps, generalized abdominal pain, rectal bleeding.  melena    SPECIMENS RECEIVED:  A.  DUODENUM, BIOPSY  B.  GASTRIC ANTRUM, BIOPSY  C.  ESOPHAGUS, BIOPSY  D.  SIGMOID COLON BIOPSY  E.  SIGMOID COLON POLYP    MICROSCOPIC DESCRIPTION:  Tissue blocks are prepared and slides are examined microscopically on all  specimens. See diagnosis for details.    The internal and external (both positive and negative) controls  "reacted  appropriately. Some of our immunohistochemical and in situ hybridization  studies are performed as analyte specific reagents. The                           following statement  applies to those tests: This test was developed, and its performance  characteristics determined by [facility]. It has not been  cleared or approved by the US Food and Drug Administration. However, the  FDA has determined that approval and clearance are not necessary.    Professional interpretation rendered by Kishan Dumas M.D. at [place],  [facility], [address] [place] [address].    GROSS DESCRIPTION:  A.  Specimen is received in 1 formalin filled container labeled \"duodenum  biopsy\" and consists of a single portion of tan soft tissue measuring 0.3 x  0.2 x 0.2 cm.  The specimen is submitted entirely in 1 cassette.  BW  B.  Specimen is received in 1 formalin filled container labeled \"gastric antrum  biopsy\" and consists of 2 portions of tan soft tissue measuring 0.4 x 0.3 x  0.2 cm.  The specimen is submitted entirely in 1 cassette.  C.  Specimen is received in 1 formalin filled container labeled \"esophagus  biopsy\" and consists of                           2 portions of gray soft tissue measuring 0.6 x 0.5 x  0.1 cm.  The specimen is submitted entirely in 1 cassette.  D.  Specimen is received in 1 formalin filled container labeled \"sigmoid colon  biopsy\" and consists of a single portion of tan soft tissue measuring 0.5 x  0.3 x 0.1 cm.  The specimen is submitted entirely in 1 cassette.  E.  Specimen is received in 1 formalin filled container labeled \"sigmoid colon  polyp cold biopsy\" and consists of a single portion of tan soft tissue  measuring 0.5 x 0.4 x 0.2 cm.  The specimen is submitted entirely in 1  cassette.    REVIEWED, DIAGNOSED AND ELECTRONICALLY  SIGNED BY:    Kishan Dumas M.D.  CPT CODES:  88305x5, 13911       Assessment & Plan      1. Acute gastric ulcer with hemorrhage    1.  " Abdominal pain with melena, resolved.  Continue Prilosec.  2.  H. pylori gastritis, completed Prevpac.  3.  Gastric ulcer, continue PPI.  Schedule EGD to document healing.  4.  Abdominal pain and rectal bleeding, well controlled.  5.  Diverticulosis, continue high-fiber diet.  6.  Colon polyps, repeat colonoscopy in 3 years.  7.  Obesity, recommend exercise and diet control.       Orders placed during this encounter include:  Orders Placed This Encounter   Procedures   • CBC (No Diff)     Standing Status:   Future     Order Specific Question:   Release to patient     Answer:   Routine Release   • Obtain Informed Consent     Standing Status:   Future     Order Specific Question:   Informed Consent Given For     Answer:   ESOPHAGOGASTRODUODENOSCOPY       ESOPHAGOGASTRODUODENOSCOPY (N/A)    Review and/or summary of lab tests, radiology, procedures, medications. Review and summary of old records and obtaining of history. The risks and benefits of my recommendations, as well as other treatment options were discussed with the patient today. Questions were answered.    No orders of the defined types were placed in this encounter.      Follow-up: Return in about 1 month (around 3/21/2023).               Results for orders placed or performed during the hospital encounter of 23   TISSUE EXAM, [facility] ([facility])    Specimen: A: Small Intestine, Duodenum; Tissue    B: Gastric, Antrum; Tissue    C: Esophagus; Tissue    D: Large Intestine, Sigmoid Colon; Tissue    E: Large Intestine, Sigmoid Colon; Polyp   Result Value Ref Range    Reference Lab Report       [facility]  [address] [place] [address]  Phone: [phone] or [phone]  Fax: [phone]  Umberto Maynard M.D., Medical Director    PATIENT NAME                           LABORATORY NO.  [address]  KARINA MEZA.                [id number]  [id number]                         AGE              SEX  SSN            CLIENT REF #  [facility]           67      1955  IRA    [id number]   [id number]    [place]                       REQUESTING M.D.     ATTENDING M.D.     COPY TO.  [address] [place] [address]                 [place] [address]             MARCOS  DATE COLLECTED      DATE RECEIVED      DATE REPORTED  01/04/2023 01/04/2023 01/06/2023    DIAGNOSIS:  A.   DUODENUM, BIOPSY:  No significant histologic abnormality  B.   GASTRIC ANTRUM, BIOPSY:  Active chronic gastritis  Positive for Helicobacter pylori  C.   ESOPHAGUS, BIOPSY:  Benign squamous mucosa  D.   SIGMOID  COLON BIOPSY:  No significant histologic abnormality  E.   SIGMOID COLON POLYP:  Tubular adenoma    JBS/sm    COMMENT:  I ordered H. pylori immunohistochemical (IHC) stain on block  B1 because a typical inflammatory infiltrate was present, and organisms could  not be confirmed on H&E staining.  H. pylori IHC stain is positive for  Helicobacter pylori.  Control tissue stains as expected.    CLINICAL HISTORY:  Personal history of colonic polyps, generalized abdominal pain, rectal bleeding.  melena    SPECIMENS RECEIVED:  A.  DUODENUM, BIOPSY  B.  GASTRIC ANTRUM, BIOPSY  C.  ESOPHAGUS, BIOPSY  D.  SIGMOID COLON BIOPSY  E.  SIGMOID COLON POLYP    MICROSCOPIC DESCRIPTION:  Tissue blocks are prepared and slides are examined microscopically on all  specimens. See diagnosis for details.    The internal and external (both positive and negative) controls reacted  appropriately. Some of our immunohistochemical and in situ hybridization  studies are performed as analyte specific reagents. The  following statement  applies to those tests: This test was developed, and its performance  characteristics determined by [facility]. It has not been  cleared or approved by the US Food and Drug Administration. However, the  FDA has determined that approval and clearance are not  "necessary.    Professional interpretation rendered by iKshan Dumas M.D. at [place],  [facility], [address] [place] [address].    GROSS DESCRIPTION:  A.  Specimen is received in 1 formalin filled container labeled \"duodenum  biopsy\" and consists of a single portion of tan soft tissue measuring 0.3 x  0.2 x 0.2 cm.  The specimen is submitted entirely in 1 cassette.  BW  B.  Specimen is received in 1 formalin filled container labeled \"gastric antrum  biopsy\" and consists of 2 portions of tan soft tissue measuring 0.4 x 0.3 x  0.2 cm.  The specimen is submitted entirely in 1 cassette.  C.  Specimen is received in 1 formalin filled container labeled \"esophagus  biopsy\" and consists of  2 portions of gray soft tissue measuring 0.6 x 0.5 x  0.1 cm.  The specimen is submitted entirely in 1 cassette.  D.  Specimen is received in 1 formalin filled container labeled \"sigmoid colon  biopsy\" and consists of a single portion of tan soft tissue measuring 0.5 x  0.3 x 0.1 cm.  The specimen is submitted entirely in 1 cassette.  E.  Specimen is received in 1 formalin filled container labeled \"sigmoid colon  polyp cold biopsy\" and consists of a single portion of tan soft tissue  measuring 0.5 x 0.4 x 0.2 cm.  The specimen is submitted entirely in 1  cassette.    REVIEWED, DIAGNOSED AND ELECTRONICALLY  SIGNED BY:    Kishan Dumas M.D.  CPT CODES:  88305x5, 46762     POC Glucose Once    Specimen: Blood   Result Value Ref Range    Glucose 147 (H) 70 - 130 mg/dL   Results for orders placed or performed during the hospital encounter of 12/28/22   PREVIOUS HISTORY    Specimen: Blood   Result Value Ref Range    Previous History Previous Record on File    Gray Top   Result Value Ref Range    Extra Tube Hold for add-ons.    Gold Top - SST   Result Value Ref Range    Extra Tube Hold for add-ons.    Urinalysis, Microscopic Only - Urine, Clean Catch    Specimen: Urine, Clean Catch   Result Value Ref Range    RBC, UA None Seen " None Seen /HPF    WBC, UA 3-5 None Seen, 0-2, 3-5 /HPF    Bacteria, UA 1+ (A) None Seen /HPF    Squamous Epithelial Cells, UA 3-5 (A) None Seen, 0-2 /HPF    Hyaline Casts, UA Too Numerous to Count None Seen /LPF    Methodology Manual Light Microscopy    Urinalysis With Microscopic If Indicated (No Culture) - Urine, Clean Catch    Specimen: Urine, Clean Catch   Result Value Ref Range    Color, UA Orange (A) Yellow, Straw, Dark Yellow, Connie    Appearance, UA Cloudy (A) Clear    pH, UA <=5.0 5.0 - 9.0    Specific Gravity, UA 1.031 (H) 1.003 - 1.030    Glucose,  mg/dL (Trace) (A) Negative    Ketones, UA 15 mg/dL (1+) (A) Negative    Bilirubin, UA Small (1+) (A) Negative    Blood, UA Negative Negative    Protein, UA 30 mg/dL (1+) (A) Negative    Leuk Esterase, UA Trace (A) Negative    Nitrite, UA Negative Negative    Urobilinogen, UA 1.0 E.U./dL 0.2 - 1.0 E.U./dL   CBC Auto Differential    Specimen: Blood   Result Value Ref Range    WBC 11.99 (H) 3.40 - 10.80 10*3/mm3    RBC 4.20 4.14 - 5.80 10*6/mm3    Hemoglobin 13.5 13.0 - 17.7 g/dL    Hematocrit 41.4 37.5 - 51.0 %    MCV 98.6 (H) 79.0 - 97.0 fL    MCH 32.1 26.6 - 33.0 pg    MCHC 32.6 31.5 - 35.7 g/dL    RDW 12.5 12.3 - 15.4 %    RDW-SD 44.9 37.0 - 54.0 fl    MPV 10.9 6.0 - 12.0 fL    Platelets 153 140 - 450 10*3/mm3    Neutrophil % 79.5 (H) 42.7 - 76.0 %    Lymphocyte % 14.0 (L) 19.6 - 45.3 %    Monocyte % 5.7 5.0 - 12.0 %    Eosinophil % 0.1 (L) 0.3 - 6.2 %    Basophil % 0.4 0.0 - 1.5 %    Immature Grans % 0.3 0.0 - 0.5 %    Neutrophils, Absolute 9.53 (H) 1.70 - 7.00 10*3/mm3    Lymphocytes, Absolute 1.68 0.70 - 3.10 10*3/mm3    Monocytes, Absolute 0.68 0.10 - 0.90 10*3/mm3    Eosinophils, Absolute 0.01 0.00 - 0.40 10*3/mm3    Basophils, Absolute 0.05 0.00 - 0.20 10*3/mm3    Immature Grans, Absolute 0.04 0.00 - 0.05 10*3/mm3    nRBC 0.0 0.0 - 0.2 /100 WBC   Light Blue Top   Result Value Ref Range    Extra Tube Hold for add-ons.    Troponin    Specimen: Blood    Result Value Ref Range    Troponin T <0.010 0.000 - 0.030 ng/mL   Type & Screen    Specimen: Blood   Result Value Ref Range    ABO Type B     RH type Positive     Antibody Screen Negative     T&S Expiration Date 12/31/2022 11:59:59 PM    BNP    Specimen: Blood   Result Value Ref Range    proBNP 109.4 0.0 - 900.0 pg/mL   Magnesium    Specimen: Blood   Result Value Ref Range    Magnesium 1.6 1.6 - 2.4 mg/dL   Lipase    Specimen: Blood   Result Value Ref Range    Lipase 26 13 - 60 U/L   Comprehensive Metabolic Panel    Specimen: Blood   Result Value Ref Range    Glucose 235 (H) 65 - 99 mg/dL    BUN 17 8 - 23 mg/dL    Creatinine 1.35 (H) 0.76 - 1.27 mg/dL    Sodium 137 136 - 145 mmol/L    Potassium 4.4 3.5 - 5.2 mmol/L    Chloride 100 98 - 107 mmol/L    CO2 22.0 22.0 - 29.0 mmol/L    Calcium 9.2 8.6 - 10.5 mg/dL    Total Protein 6.5 6.0 - 8.5 g/dL    Albumin 3.7 3.5 - 5.2 g/dL    ALT (SGPT) 45 (H) 1 - 41 U/L    AST (SGOT) 34 1 - 40 U/L    Alkaline Phosphatase 114 39 - 117 U/L    Total Bilirubin 0.7 0.0 - 1.2 mg/dL    Globulin 2.8 gm/dL    A/G Ratio 1.3 g/dL    BUN/Creatinine Ratio 12.6 7.0 - 25.0    Anion Gap 15.0 5.0 - 15.0 mmol/L    eGFR 57.9 (L) >60.0 mL/min/1.73   ECG 12 Lead Dyspnea   Result Value Ref Range    QT Interval 348 ms    QTC Interval 462 ms   Results for orders placed or performed during the hospital encounter of 09/05/22   Gold Top - SST   Result Value Ref Range    Extra Tube Hold for add-ons.    Green Top (Gel)   Result Value Ref Range    Extra Tube Hold for add-ons.    Urinalysis With Microscopic If Indicated (No Culture) - Urine, Clean Catch    Specimen: Urine, Clean Catch   Result Value Ref Range    Color, UA Yellow Yellow, Straw, Dark Yellow, Connie    Appearance, UA Clear Clear    pH, UA 7.5 5.0 - 9.0    Specific Gravity, UA 1.025 1.003 - 1.030    Glucose, UA Negative Negative    Ketones, UA Negative Negative    Bilirubin, UA Negative Negative    Blood, UA Negative Negative    Protein, UA  Negative Negative    Leuk Esterase, UA Negative Negative    Nitrite, UA Negative Negative    Urobilinogen, UA 1.0 E.U./dL 0.2 - 1.0 E.U./dL   CBC Auto Differential    Specimen: Blood   Result Value Ref Range    WBC 5.45 3.40 - 10.80 10*3/mm3    RBC 4.53 4.14 - 5.80 10*6/mm3    Hemoglobin 15.0 13.0 - 17.7 g/dL    Hematocrit 46.4 37.5 - 51.0 %    .4 (H) 79.0 - 97.0 fL    MCH 33.1 (H) 26.6 - 33.0 pg    MCHC 32.3 31.5 - 35.7 g/dL    RDW 12.8 12.3 - 15.4 %    RDW-SD 48.8 37.0 - 54.0 fl    MPV 10.4 6.0 - 12.0 fL    Platelets 168 140 - 450 10*3/mm3    Neutrophil % 51.5 42.7 - 76.0 %    Lymphocyte % 35.8 19.6 - 45.3 %    Monocyte % 10.3 5.0 - 12.0 %    Eosinophil % 1.5 0.3 - 6.2 %    Basophil % 0.7 0.0 - 1.5 %    Immature Grans % 0.2 0.0 - 0.5 %    Neutrophils, Absolute 2.81 1.70 - 7.00 10*3/mm3    Lymphocytes, Absolute 1.95 0.70 - 3.10 10*3/mm3    Monocytes, Absolute 0.56 0.10 - 0.90 10*3/mm3    Eosinophils, Absolute 0.08 0.00 - 0.40 10*3/mm3    Basophils, Absolute 0.04 0.00 - 0.20 10*3/mm3    Immature Grans, Absolute 0.01 0.00 - 0.05 10*3/mm3    nRBC 0.0 0.0 - 0.2 /100 WBC     *Note: Due to a large number of results and/or encounters for the requested time period, some results have not been displayed. A complete set of results can be found in Results Review.         This document has been electronically signed by Jhonny King MD on February 22, 2023 19:01 CST

## 2023-03-02 ENCOUNTER — HOSPITAL ENCOUNTER (OUTPATIENT)
Age: 68
Setting detail: SPECIMEN
Discharge: HOME | End: 2023-03-02

## 2023-03-10 ENCOUNTER — ANESTHESIA EVENT (OUTPATIENT)
Dept: GASTROENTEROLOGY | Facility: HOSPITAL | Age: 68
End: 2023-03-10
Payer: MEDICARE

## 2023-03-10 ENCOUNTER — HOSPITAL ENCOUNTER (OUTPATIENT)
Facility: HOSPITAL | Age: 68
Setting detail: HOSPITAL OUTPATIENT SURGERY
Discharge: HOME | End: 2023-03-10
Attending: INTERNAL MEDICINE | Admitting: INTERNAL MEDICINE
Payer: MEDICARE

## 2023-03-10 ENCOUNTER — ANESTHESIA (OUTPATIENT)
Dept: GASTROENTEROLOGY | Facility: HOSPITAL | Age: 68
End: 2023-03-10
Payer: MEDICARE

## 2023-03-10 VITALS
TEMPERATURE: 97.5 F | HEIGHT: 72 IN | WEIGHT: 250 LBS | RESPIRATION RATE: 20 BRPM | OXYGEN SATURATION: 99 % | HEART RATE: 79 BPM | SYSTOLIC BLOOD PRESSURE: 128 MMHG | DIASTOLIC BLOOD PRESSURE: 80 MMHG | BODY MASS INDEX: 33.86 KG/M2

## 2023-03-10 DIAGNOSIS — K25.0 ACUTE GASTRIC ULCER WITH HEMORRHAGE: ICD-10-CM

## 2023-03-10 LAB — GLUCOSE BLDC GLUCOMTR-MCNC: 93 MG/DL (ref 70–130)

## 2023-03-10 PROCEDURE — 25010000002 PROPOFOL 10 MG/ML EMULSION: Performed by: NURSE ANESTHETIST, CERTIFIED REGISTERED

## 2023-03-10 PROCEDURE — 43239 EGD BIOPSY SINGLE/MULTIPLE: CPT | Performed by: INTERNAL MEDICINE

## 2023-03-10 PROCEDURE — 88305 TISSUE EXAM BY PATHOLOGIST: CPT

## 2023-03-10 PROCEDURE — 82962 GLUCOSE BLOOD TEST: CPT

## 2023-03-10 RX ORDER — DEXTROSE AND SODIUM CHLORIDE 5; .45 G/100ML; G/100ML
30 INJECTION, SOLUTION INTRAVENOUS CONTINUOUS PRN
Status: DISCONTINUED | OUTPATIENT
Start: 2023-03-10 | End: 2023-03-10 | Stop reason: HOSPADM

## 2023-03-10 RX ORDER — PROPOFOL 10 MG/ML
VIAL (ML) INTRAVENOUS AS NEEDED
Status: DISCONTINUED | OUTPATIENT
Start: 2023-03-10 | End: 2023-03-10 | Stop reason: SURG

## 2023-03-10 RX ORDER — SODIUM CHLORIDE 9 MG/ML
40 INJECTION, SOLUTION INTRAVENOUS AS NEEDED
Status: DISCONTINUED | OUTPATIENT
Start: 2023-03-10 | End: 2023-03-10 | Stop reason: HOSPADM

## 2023-03-10 RX ORDER — LIDOCAINE HYDROCHLORIDE 20 MG/ML
INJECTION, SOLUTION INTRAVENOUS AS NEEDED
Status: DISCONTINUED | OUTPATIENT
Start: 2023-03-10 | End: 2023-03-10 | Stop reason: SURG

## 2023-03-10 RX ADMIN — DEXTROSE AND SODIUM CHLORIDE 30 ML/HR: 5; 450 INJECTION, SOLUTION INTRAVENOUS at 13:50

## 2023-03-10 RX ADMIN — LIDOCAINE HYDROCHLORIDE 100 MG: 20 INJECTION, SOLUTION INTRAVENOUS at 14:38

## 2023-03-10 RX ADMIN — PROPOFOL 100 MG: 10 INJECTION, EMULSION INTRAVENOUS at 14:38

## 2023-03-10 NOTE — ANESTHESIA PREPROCEDURE EVALUATION
Anesthesia Evaluation     Patient summary reviewed and Nursing notes reviewed   NPO Solid Status: > 8 hours  NPO Liquid Status: > 2 hours           Airway   Mallampati: II  TM distance: >3 FB  Neck ROM: full  no difficulty expected  Dental    (+) upper dentures    Pulmonary - normal exam   (+) pneumonia , pulmonary embolism,   Cardiovascular - normal exam    (+) DVT, hyperlipidemia,       Neuro/Psych  GI/Hepatic/Renal/Endo    (+)  GI bleeding , liver disease, diabetes mellitus,     Musculoskeletal     Abdominal    Substance History      OB/GYN          Other   arthritis,                        Anesthesia Plan    ASA 3     general   total IV anesthesia  intravenous induction     Anesthetic plan, risks, benefits, and alternatives have been provided, discussed and informed consent has been obtained with: patient.        CODE STATUS:

## 2023-03-10 NOTE — ANESTHESIA POSTPROCEDURE EVALUATION
Patient: Scott Scanlon    Procedure Summary     Date: 03/10/23 Room / Location: [facility] ENDOSCOPY 1 / [facility] ENDOSCOPY    Anesthesia Start: 1436 Anesthesia Stop: 1442    Procedure: ESOPHAGOGASTRODUODENOSCOPY Diagnosis:       Acute gastric ulcer with hemorrhage      (Acute gastric ulcer with hemorrhage [K25.0])    Surgeons: Jhonny King MD Provider: Levon Morales CRNA    Anesthesia Type: general ASA Status: 3          Anesthesia Type: general    Vitals  No vitals data found for the desired time range.          Post Anesthesia Care and Evaluation    Patient location during evaluation: PHASE II  Patient participation: complete - patient participated  Level of consciousness: awake  Pain score: 0  Pain management: adequate    Airway patency: patent  Anesthetic complications: No anesthetic complications  PONV Status: none  Cardiovascular status: acceptable  Respiratory status: acceptable  Hydration status: acceptable    Comments: Hr 79, bp 164/77, rr 18, o2 sats 100%  No anesthesia care post op

## 2023-03-14 LAB — REF LAB TEST METHOD: NORMAL

## 2023-04-26 ENCOUNTER — OFFICE VISIT (OUTPATIENT)
Dept: GASTROENTEROLOGY | Facility: CLINIC | Age: 68
End: 2023-04-26
Payer: MEDICARE

## 2023-04-26 VITALS
SYSTOLIC BLOOD PRESSURE: 147 MMHG | HEART RATE: 68 BPM | HEIGHT: 72 IN | BODY MASS INDEX: 34.67 KG/M2 | DIASTOLIC BLOOD PRESSURE: 86 MMHG | WEIGHT: 256 LBS

## 2023-04-26 DIAGNOSIS — K25.0 ACUTE GASTRIC ULCER WITH HEMORRHAGE: Primary | ICD-10-CM

## 2023-04-26 PROCEDURE — 1160F RVW MEDS BY RX/DR IN RCRD: CPT | Performed by: INTERNAL MEDICINE

## 2023-04-26 PROCEDURE — 1159F MED LIST DOCD IN RCRD: CPT | Performed by: INTERNAL MEDICINE

## 2023-04-26 PROCEDURE — 99213 OFFICE O/P EST LOW 20 MIN: CPT | Performed by: INTERNAL MEDICINE

## 2023-05-04 ENCOUNTER — HOSPITAL ENCOUNTER (EMERGENCY)
Facility: HOSPITAL | Age: 68
Discharge: HOME | End: 2023-05-04
Attending: FAMILY MEDICINE
Payer: MEDICARE

## 2023-05-04 VITALS
WEIGHT: 246 LBS | SYSTOLIC BLOOD PRESSURE: 132 MMHG | BODY MASS INDEX: 33.32 KG/M2 | HEIGHT: 72 IN | TEMPERATURE: 97.9 F | RESPIRATION RATE: 18 BRPM | HEART RATE: 88 BPM | OXYGEN SATURATION: 93 % | DIASTOLIC BLOOD PRESSURE: 77 MMHG

## 2023-05-04 DIAGNOSIS — K04.7 DENTAL INFECTION: Primary | ICD-10-CM

## 2023-05-04 PROCEDURE — 99283 EMERGENCY DEPT VISIT LOW MDM: CPT

## 2023-05-04 RX ORDER — CLINDAMYCIN HYDROCHLORIDE 300 MG/1
300 CAPSULE ORAL 3 TIMES DAILY
Qty: 30 CAPSULE | Refills: 0 | Status: SHIPPED | OUTPATIENT
Start: 2023-05-04

## 2023-05-04 RX ORDER — CLINDAMYCIN HYDROCHLORIDE 150 MG/1
300 CAPSULE ORAL EVERY 6 HOURS SCHEDULED
Status: COMPLETED | OUTPATIENT
Start: 2023-05-04 | End: 2023-05-04

## 2023-05-04 RX ORDER — MELOXICAM 15 MG/1
15 TABLET ORAL DAILY
Qty: 15 TABLET | Refills: 0 | Status: SHIPPED | OUTPATIENT
Start: 2023-05-04 | End: 2023-05-19

## 2023-05-04 RX ADMIN — CLINDAMYCIN HYDROCHLORIDE 300 MG: 150 CAPSULE ORAL at 07:10

## 2023-05-04 NOTE — ED PROVIDER NOTES
Subjective   History of Present Illness  Patient complains of right lower dental pain x2 days.  He has very mild right facial swelling.  He states he is made several attempts to get into see a dentist but has not been successful.    Dental Pain  Quality:  Aching  Severity:  Moderate  Duration:  2 days  Timing:  Constant  Progression:  Waxing and waning  Chronicity:  New  Relieved by:  Nothing  Worsened by:  Pressure  Associated symptoms: facial swelling and gum swelling    Associated symptoms: no congestion, no fever, no headaches and no neck pain        Review of Systems   Constitutional: Negative for appetite change, chills, diaphoresis, fatigue and fever.   HENT: Positive for dental problem and facial swelling. Negative for congestion, ear discharge, ear pain, nosebleeds, rhinorrhea, sinus pressure, sore throat and trouble swallowing.    Eyes: Negative for discharge and redness.   Respiratory: Negative for apnea, cough, chest tightness, shortness of breath and wheezing.    Cardiovascular: Negative for chest pain.   Gastrointestinal: Negative for abdominal pain, diarrhea, nausea and vomiting.   Endocrine: Negative for polyuria.   Genitourinary: Negative for dysuria, frequency and urgency.   Musculoskeletal: Negative for myalgias and neck pain.   Skin: Negative for color change and rash.   Allergic/Immunologic: Negative for immunocompromised state.   Neurological: Negative for dizziness, seizures, syncope, weakness, light-headedness and headaches.   Hematological: Negative for adenopathy. Does not bruise/bleed easily.   Psychiatric/Behavioral: Negative for behavioral problems and confusion.   All other systems reviewed and are negative.      Past Medical History:   Diagnosis Date   • BPH (benign prostatic hyperplasia)    • Diabetes mellitus    • Hypercholesterolemia    • Hyperlipidemia        Allergies   Allergen Reactions   • Celecoxib Confusion   • Corticosteroids Other (See Comments)     Pt states caused  convulsions   • Penicillins Confusion       Past Surgical History:   Procedure Laterality Date   • COLONOSCOPY N/A 1/10/2022   • COLONOSCOPY N/A 1/4/2023    Procedure: COLONOSCOPY;  Surgeon: Jhonny King MD;  Location: [facility] ENDOSCOPY;  Service: Gastroenterology;  Laterality: N/A;   • ENDOSCOPY N/A 1/4/2023    Procedure: ESOPHAGOGASTRODUODENOSCOPY;  Surgeon: Jhonny King MD;  Location: [facility] ENDOSCOPY;  Service: Gastroenterology;  Laterality: N/A;   • ENDOSCOPY N/A 3/10/2023    Procedure: ESOPHAGOGASTRODUODENOSCOPY;  Surgeon: Jhonny King MD;  Location: [facility] ENDOSCOPY;  Service: Gastroenterology;  Laterality: N/A;   • JOINT REPLACEMENT     • KNEE ARTHROSCOPY Left    • TOTAL KNEE ARTHROPLASTY Right        Family History   Problem Relation Age of Onset   • Hypertension Mother    • Hypertension Father        Social History     Socioeconomic History   • Marital status: Single   Tobacco Use   • Smoking status: Some Days     Years: 30.00     Types: Cigarettes   • Smokeless tobacco: Never   Vaping Use   • Vaping Use: Never used   Substance and Sexual Activity   • Alcohol use: Yes     Comment: occ beer   • Drug use: Never   • Sexual activity: Defer           Objective   Physical Exam  Vitals and nursing note reviewed.   Constitutional:       Appearance: He is well-developed.   HENT:      Head: Normocephalic and atraumatic.      Jaw: Swelling (mild) present.        Nose: Nose normal.      Mouth/Throat:      Dentition: Dental tenderness, gingival swelling and dental caries present.     Eyes:      General: No scleral icterus.        Right eye: No discharge.         Left eye: No discharge.      Conjunctiva/sclera: Conjunctivae normal.      Pupils: Pupils are equal, round, and reactive to light.   Neck:      Trachea: No tracheal deviation.   Cardiovascular:      Rate and Rhythm: Normal rate and regular rhythm.      Heart sounds: Normal heart sounds. No murmur heard.  Pulmonary:      Effort: Pulmonary effort  is normal. No respiratory distress.      Breath sounds: Normal breath sounds. No stridor. No wheezing or rales.   Abdominal:      General: Bowel sounds are normal. There is no distension.      Palpations: Abdomen is soft. There is no mass.      Tenderness: There is no abdominal tenderness. There is no guarding or rebound.   Musculoskeletal:      Cervical back: Normal range of motion and neck supple.   Skin:     General: Skin is warm and dry.      Findings: No erythema or rash.   Neurological:      Mental Status: He is alert and oriented to person, place, and time.      Coordination: Coordination normal.   Psychiatric:         Behavior: Behavior normal.         Thought Content: Thought content normal.         Procedures           ED Course                                           MDM    Final diagnoses:   Dental infection       ED Disposition  ED Disposition     ED Disposition   Discharge    Condition   Stable    Comment   --             [facility] ([facility]  [phone]  Schedule an appointment as soon as possible for a visit in 1 day           Medication List      New Prescriptions    clindamycin 300 MG capsule  Commonly known as: CLEOCIN  Take 1 capsule by mouth 3 (Three) Times a Day.           Where to Get Your Medications      These medications were sent to [place], [place] - [facility] [phone] [facility] [phone] [address] [address]    Phone: [phone]   · clindamycin 300 MG capsule  · meloxicam 15 MG tablet          Matthew Lara MD  05/04/23 [id number]

## 2023-05-08 NOTE — PROGRESS NOTES
Chief Complaint   Patient presents with   • endo f/u      Gastric ulcer with hemorrhage       Subjective    Scott Scanlon is a 67 y.o. male.    History of Present Illness  Patient presented to GI clinic for follow-up visit today.  Feels better currently.  Denied abdominal pain, nausea or vomiting.  Bowel movements are regular.  Weight is stable.  EGD was consistent with hiatal hernia, esophagitis and gastritis.  Path was consistent with reactive gastropathy.       The following portions of the patient's history were reviewed and updated as appropriate:   Past Medical History:   Diagnosis Date   • BPH (benign prostatic hyperplasia)    • Diabetes mellitus    • Hypercholesterolemia    • Hyperlipidemia      Past Surgical History:   Procedure Laterality Date   • COLONOSCOPY N/A 1/10/2022   • COLONOSCOPY N/A 1/4/2023    Procedure: COLONOSCOPY;  Surgeon: Jhonny King MD;  Location: [facility] ENDOSCOPY;  Service: Gastroenterology;  Laterality: N/A;   • ENDOSCOPY N/A 1/4/2023    Procedure: ESOPHAGOGASTRODUODENOSCOPY;  Surgeon: Jhonny King MD;  Location: [facility] ENDOSCOPY;  Service: Gastroenterology;  Laterality: N/A;   • ENDOSCOPY N/A 3/10/2023    Procedure: ESOPHAGOGASTRODUODENOSCOPY;  Surgeon: Jhonny King MD;  Location: [facility] ENDOSCOPY;  Service: Gastroenterology;  Laterality: N/A;   • JOINT REPLACEMENT     • KNEE ARTHROSCOPY Left    • TOTAL KNEE ARTHROPLASTY Right      Family History   Problem Relation Age of Onset   • Hypertension Mother    • Hypertension Father        Prior to Admission medications    Medication Sig Start Date End Date Taking? Authorizing Provider   albuterol sulfate  (90 Base) MCG/ACT inhaler Inhale 2 puffs. 7/6/22  Yes Glory Ramos MD   aspirin 81 MG chewable tablet Chew 1 tablet Daily. 1/29/22  Yes Aj Parks MD   cholecalciferol (VITAMIN D3) 1.25 MG (46399 UT) capsule Take 1 capsule by mouth 1 (One) Time Per Week. 7/6/22  Yes Glory Ramos MD    lovastatin (MEVACOR) 40 MG tablet Take 1 tablet by mouth every night at bedtime. 7/6/22  Yes Glory Ramos MD   metFORMIN (GLUCOPHAGE) 1000 MG tablet Take 1 tablet by mouth. 7/6/22  Yes Glory Ramos MD   multivitamin (THERAGRAN) tablet tablet Take 1 tablet by mouth Daily.   Yes ProviderGlory MD   sucralfate (CARAFATE) 1 g tablet Take 1 tablet by mouth 4 (Four) Times a Day Before Meals & at Bedtime As Needed. 2/7/23  Yes Glory Ramos MD   tamsulosin (FLOMAX) 0.4 MG capsule 24 hr capsule Take 1 capsule by mouth Daily. 1/29/22  Yes Aj Parks MD   clindamycin (CLEOCIN) 300 MG capsule Take 1 capsule by mouth 3 (Three) Times a Day. 5/4/23   Matthew Lara MD   meloxicam (MOBIC) 15 MG tablet Take 1 tablet by mouth Daily for 15 days. 5/4/23 5/19/23  Matthew Lara MD     Allergies   Allergen Reactions   • Celecoxib Confusion   • Corticosteroids Other (See Comments)     Pt states caused convulsions   • Penicillins Confusion     Social History     Socioeconomic History   • Marital status: Single   Tobacco Use   • Smoking status: Some Days     Years: 30.00     Types: Cigarettes   • Smokeless tobacco: Never   Vaping Use   • Vaping Use: Never used   Substance and Sexual Activity   • Alcohol use: Yes     Comment: occ beer   • Drug use: Never   • Sexual activity: Defer       Review of Systems  Review of Systems   Constitutional: Negative for chills, fatigue, fever and unexpected weight change.   HENT: Negative for congestion, ear discharge, hearing loss, nosebleeds and sore throat.    Eyes: Negative for pain, discharge and redness.   Respiratory: Negative for cough, chest tightness, shortness of breath and wheezing.    Cardiovascular: Negative for chest pain and palpitations.   Gastrointestinal: Negative for abdominal distention, abdominal pain, blood in stool, constipation, diarrhea, nausea and vomiting.   Endocrine: Negative for cold intolerance, polydipsia, polyphagia and  "polyuria.   Genitourinary: Negative for dysuria, flank pain, frequency, hematuria and urgency.   Musculoskeletal: Negative for arthralgias, back pain, joint swelling and myalgias.   Skin: Negative for color change, pallor and rash.   Neurological: Negative for tremors, seizures, syncope, weakness and headaches.   Hematological: Negative for adenopathy. Does not bruise/bleed easily.   Psychiatric/Behavioral: Negative for behavioral problems, confusion, dysphoric mood, hallucinations and suicidal ideas. The patient is not nervous/anxious.         /86 (BP Location: Right arm)   Pulse 68   Ht 182.9 cm (72\")   Wt 116 kg (256 lb)   BMI 34.72 kg/m²     Objective    Physical Exam  Constitutional:       Appearance: He is well-developed.   HENT:      Head: Normocephalic and atraumatic.   Eyes:      Conjunctiva/sclera: Conjunctivae normal.      Pupils: Pupils are equal, round, and reactive to light.   Neck:      Thyroid: No thyromegaly.   Cardiovascular:      Rate and Rhythm: Normal rate and regular rhythm.      Heart sounds: Normal heart sounds. No murmur heard.  Pulmonary:      Effort: Pulmonary effort is normal.      Breath sounds: Normal breath sounds. No wheezing.   Abdominal:      General: Bowel sounds are normal. There is no distension.      Palpations: Abdomen is soft. There is no mass.      Tenderness: There is no abdominal tenderness.      Hernia: No hernia is present.   Genitourinary:     Comments: No lesions noted  Musculoskeletal:         General: No tenderness. Normal range of motion.      Cervical back: Normal range of motion and neck supple.   Lymphadenopathy:      Cervical: No cervical adenopathy.   Skin:     General: Skin is warm and dry.      Findings: No rash.   Neurological:      Mental Status: He is alert and oriented to person, place, and time.      Cranial Nerves: No cranial nerve deficit.   Psychiatric:         Thought Content: Thought content normal.       Admission on 03/10/2023, Discharged " "on 03/10/2023   Component Date Value Ref Range Status   • Glucose 03/10/2023 93  70 - 130 mg/dL Final    : [id number] DWIGHT Baird ID: [id number]   • Reference Lab Report 03/10/2023    Final                    Value:[facility]  [address] [place] [address]  Phone: [phone] or [phone]  Fax: [phone]  Umberto Maynard M.D., Medical Director    PATIENT NAME                                     LABORATORY NO.  1800   KARINA MEZA.                          [id number]  [id number]                                 AGE                    SEX   SSN              CLIENT REF #  [facility]                   67        1955           [id number]      [id number]    [place]                               REQUESTING M.D.           ATTENDING M.D.         COPY TO.  [address] [place] [address]                         [facility]                   [address]  DATE COLLECTED            DATE RECEIVED          DATE REPORTED  03/10/2023                2023             2023    DIAGNOSIS:  A.     GASTRIC ANTRUM, BIOPSY:  Reactive gastropathy  B.     ESOPHAGUS, BIOPSY:  Reactive squamous                           mucosa with parakeratosis    JBS/sm    CLINICAL HISTORY:  Acute gastric ulcer with hemorrhage    SPECIMENS RECEIVED:  A.    GASTRIC ANTRUM, BIOPSY  B.    ESOPHAGUS, BIOPSY    MICROSCOPIC DESCRIPTION:  Tissue blocks are prepared and slides are examined microscopically on all  specimens. See diagnosis for details.    Professional interpretation rendered by Kishan Dumas M.D. at [facility],  [facility], [address] [place] [address].    GROSS DESCRIPTION:  A.    Labeled \"antrum biopsy\" consists of 3 pieces of tan soft tissue measuring  0.3 x 0.2 x 0.2 cm in aggregate.  Submitted entirely in 1 block.  BKO  B.    Labeled \"esophagus biopsy\" consists of a single portion of tan soft " tissue  measuring 0.3 x 0.3 x 0.2 cm.  Submitted in 1 block.  BKO    REVIEWED, DIAGNOSED AND ELECTRONICALLY  SIGNED BY:    Kishan Dumas M.D.  CPT CODES:  88305x2       Assessment & Plan    No diagnosis found..   1.  Abdominal pain with melena, resolved.  2.  H. pylori gastritis, completed Prevpac.  3.  Gastric ulcer, healed upon recent endoscopy.  4.  Abdominal pain and rectal bleeding, well controlled.  5.  Diverticulosis, continue high-fiber diet.  6.  Colon polyps, repeat colonoscopy in 3 years.  7.  Obesity, recommend exercise and diet control.    Orders placed during this encounter include:  No orders of the defined types were placed in this encounter.      * Surgery not found *    Review and/or summary of lab tests, radiology, procedures, medications. Review and summary of old records and obtaining of history. The risks and benefits of my recommendations, as well as other treatment options were discussed with the patient today. Questions were answered.    No orders of the defined types were placed in this encounter.      Follow-up: Return in about 3 months (around 2023).               Results for orders placed or performed during the hospital encounter of 03/10/23   TISSUE EXAM, [facility] ([facility])    Specimen: A: Gastric, Antrum; Tissue    B: Esophagus; Tissue   Result Value Ref Range    Reference Lab Report       [facility]  [address] [place] [address]  Phone: [phone] or [phone]  Fax: [phone]  Umberto Maynard M.D., Medical Director    PATIENT NAME                                     LABORATORY NO.  1800   KARINA MEZA.                          [id number]  [id number]                                 AGE                    SEX   SSN              CLIENT REF #  [facility]                   67        1955      M     [id number]      [id number]    [place]                               REQUESTING M.D.            "ATTENDING M.D.         COPY TO.  [address] [place] [address]                         [place] [address]                     MARCOS  DATE COLLECTED            DATE RECEIVED          DATE REPORTED  03/10/2023                03/13/2023             03/14/2023    DIAGNOSIS:  A.     GASTRIC ANTRUM, BIOPSY:  Reactive gastropathy  B.     ESOPHAGUS, BIOPSY:  Reactive squamous  mucosa with parakeratosis    JBS/sm    CLINICAL HISTORY:  Acute gastric ulcer with hemorrhage    SPECIMENS RECEIVED:  A.    GASTRIC ANTRUM, BIOPSY  B.    ESOPHAGUS, BIOPSY    MICROSCOPIC DESCRIPTION:  Tissue blocks are prepared and slides are examined microscopically on all  specimens. See diagnosis for details.    Professional interpretation rendered by Kishan Dumas M.D. at [facility],  [facility], [address] [place] [address].    GROSS DESCRIPTION:  A.    Labeled \"antrum biopsy\" consists of 3 pieces of tan soft tissue measuring  0.3 x 0.2 x 0.2 cm in aggregate.  Submitted entirely in 1 block.  BKO  B.    Labeled \"esophagus biopsy\" consists of a single portion of tan soft tissue  measuring 0.3 x 0.3 x 0.2 cm.  Submitted in 1 block.  BKO    REVIEWED, DIAGNOSED AND ELECTRONICALLY  SIGNED BY:    Kishan Dumas M.D.  CPT CODES:  88305x2     POC Glucose Once    Specimen: Blood   Result Value Ref Range    Glucose 93 70 - 130 mg/dL   Results for orders placed or performed during the hospital encounter of 01/04/23   TISSUE EXAM, [facility] ([facility])    Specimen: A: Small Intestine, Duodenum; Tissue    B: Gastric, Antrum; Tissue    C: Esophagus; Tissue    D: Large Intestine, Sigmoid Colon; Tissue    E: Large Intestine, Sigmoid Colon; Polyp   Result Value Ref Range    Reference Lab Report       [facility]  [address] [place] [address]  Phone: [phone] or [phone]  Fax: [phone]  Umberto Maynard M.D., Medical Director    PATIENT NAME                           " LABORATORY NO.  1800  KARINA MEZA.                [id number]  [id number]                         AGE              SEX  N           CLIENT REF #  [facility]           67      1955  IRA    [id number]   [id number]    [place]                       REQUESTING M.D.     ATTENDING M.D.     COPY TO.  [address] [place] [address]                 [place] [address]             [facility]  DATE COLLECTED      DATE RECEIVED      DATE REPORTED  2023    DIAGNOSIS:  A.   DUODENUM, BIOPSY:  No significant histologic abnormality  B.   GASTRIC ANTRUM, BIOPSY:  Active chronic gastritis  Positive for Helicobacter pylori  C.   ESOPHAGUS, BIOPSY:  Benign squamous mucosa  D.   SIGMOID  COLON BIOPSY:  No significant histologic abnormality  E.   SIGMOID COLON POLYP:  Tubular adenoma    SASHA/heriberto    COMMENT:  I ordered H. pylori immunohistochemical (IHC) stain on block  B1 because a typical inflammatory infiltrate was present, and organisms could  not be confirmed on H&E staining.  H. pylori IHC stain is positive for  Helicobacter pylori.  Control tissue stains as expected.    CLINICAL HISTORY:  Personal history of colonic polyps, generalized abdominal pain, rectal bleeding.  melena    SPECIMENS RECEIVED:  A.  DUODENUM, BIOPSY  B.  GASTRIC ANTRUM, BIOPSY  C.  ESOPHAGUS, BIOPSY  D.  SIGMOID COLON BIOPSY  E.  SIGMOID COLON POLYP    MICROSCOPIC DESCRIPTION:  Tissue blocks are prepared and slides are examined microscopically on all  specimens. See diagnosis for details.    The internal and external (both positive and negative) controls reacted  appropriately. Some of our immunohistochemical and in situ hybridization  studies are performed as analyte specific reagents. The  following statement  applies to those tests: This test was developed, and its performance  characteristics determined by [facility]. It has not  "been  cleared or approved by the US Food and Drug Administration. However, the  FDA has determined that approval and clearance are not necessary.    Professional interpretation rendered by Kishan Dumas M.D. at [facility], [address] [place] [address].    GROSS DESCRIPTION:  A.  Specimen is received in 1 formalin filled container labeled \"duodenum  biopsy\" and consists of a single portion of tan soft tissue measuring 0.3 x  0.2 x 0.2 cm.  The specimen is submitted entirely in 1 cassette.  BW  B.  Specimen is received in 1 formalin filled container labeled \"gastric antrum  biopsy\" and consists of 2 portions of tan soft tissue measuring 0.4 x 0.3 x  0.2 cm.  The specimen is submitted entirely in 1 cassette.  C.  Specimen is received in 1 formalin filled container labeled \"esophagus  biopsy\" and consists of  2 portions of gray soft tissue measuring 0.6 x 0.5 x  0.1 cm.  The specimen is submitted entirely in 1 cassette.  D.  Specimen is received in 1 formalin filled container labeled \"sigmoid colon  biopsy\" and consists of a single portion of tan soft tissue measuring 0.5 x  0.3 x 0.1 cm.  The specimen is submitted entirely in 1 cassette.  E.  Specimen is received in 1 formalin filled container labeled \"sigmoid colon  polyp cold biopsy\" and consists of a single portion of tan soft tissue  measuring 0.5 x 0.4 x 0.2 cm.  The specimen is submitted entirely in 1  cassette.    REVIEWED, DIAGNOSED AND ELECTRONICALLY  SIGNED BY:    Kishan Dumas M.D.  CPT CODES:  88305x5, 79527     POC Glucose Once    Specimen: Blood   Result Value Ref Range    Glucose 147 (H) 70 - 130 mg/dL   Results for orders placed or performed during the hospital encounter of 12/28/22   PREVIOUS HISTORY    Specimen: Blood   Result Value Ref Range    Previous History Previous Record on File    Gray Top   Result Value Ref Range    Extra Tube Hold for add-ons.    Gold Top - SST   Result Value Ref Range    Extra Tube Hold for " add-ons.    Urinalysis, Microscopic Only - Urine, Clean Catch    Specimen: Urine, Clean Catch   Result Value Ref Range    RBC, UA None Seen None Seen /HPF    WBC, UA 3-5 None Seen, 0-2, 3-5 /HPF    Bacteria, UA 1+ (A) None Seen /HPF    Squamous Epithelial Cells, UA 3-5 (A) None Seen, 0-2 /HPF    Hyaline Casts, UA Too Numerous to Count None Seen /LPF    Methodology Manual Light Microscopy    Urinalysis With Microscopic If Indicated (No Culture) - Urine, Clean Catch    Specimen: Urine, Clean Catch   Result Value Ref Range    Color, UA Orange (A) Yellow, Straw, Dark Yellow, Connie    Appearance, UA Cloudy (A) Clear    pH, UA <=5.0 5.0 - 9.0    Specific Gravity, UA 1.031 (H) 1.003 - 1.030    Glucose,  mg/dL (Trace) (A) Negative    Ketones, UA 15 mg/dL (1+) (A) Negative    Bilirubin, UA Small (1+) (A) Negative    Blood, UA Negative Negative    Protein, UA 30 mg/dL (1+) (A) Negative    Leuk Esterase, UA Trace (A) Negative    Nitrite, UA Negative Negative    Urobilinogen, UA 1.0 E.U./dL 0.2 - 1.0 E.U./dL   CBC Auto Differential    Specimen: Blood   Result Value Ref Range    WBC 11.99 (H) 3.40 - 10.80 10*3/mm3    RBC 4.20 4.14 - 5.80 10*6/mm3    Hemoglobin 13.5 13.0 - 17.7 g/dL    Hematocrit 41.4 37.5 - 51.0 %    MCV 98.6 (H) 79.0 - 97.0 fL    MCH 32.1 26.6 - 33.0 pg    MCHC 32.6 31.5 - 35.7 g/dL    RDW 12.5 12.3 - 15.4 %    RDW-SD 44.9 37.0 - 54.0 fl    MPV 10.9 6.0 - 12.0 fL    Platelets 153 140 - 450 10*3/mm3    Neutrophil % 79.5 (H) 42.7 - 76.0 %    Lymphocyte % 14.0 (L) 19.6 - 45.3 %    Monocyte % 5.7 5.0 - 12.0 %    Eosinophil % 0.1 (L) 0.3 - 6.2 %    Basophil % 0.4 0.0 - 1.5 %    Immature Grans % 0.3 0.0 - 0.5 %    Neutrophils, Absolute 9.53 (H) 1.70 - 7.00 10*3/mm3    Lymphocytes, Absolute 1.68 0.70 - 3.10 10*3/mm3    Monocytes, Absolute 0.68 0.10 - 0.90 10*3/mm3    Eosinophils, Absolute 0.01 0.00 - 0.40 10*3/mm3    Basophils, Absolute 0.05 0.00 - 0.20 10*3/mm3    Immature Grans, Absolute 0.04 0.00 - 0.05  10*3/mm3    nRBC 0.0 0.0 - 0.2 /100 WBC   Light Blue Top   Result Value Ref Range    Extra Tube Hold for add-ons.    Troponin    Specimen: Blood   Result Value Ref Range    Troponin T <0.010 0.000 - 0.030 ng/mL   Type & Screen    Specimen: Blood   Result Value Ref Range    ABO Type B     RH type Positive     Antibody Screen Negative     T&S Expiration Date 12/31/2022 11:59:59 PM    BNP    Specimen: Blood   Result Value Ref Range    proBNP 109.4 0.0 - 900.0 pg/mL   Magnesium    Specimen: Blood   Result Value Ref Range    Magnesium 1.6 1.6 - 2.4 mg/dL   Lipase    Specimen: Blood   Result Value Ref Range    Lipase 26 13 - 60 U/L   Comprehensive Metabolic Panel    Specimen: Blood   Result Value Ref Range    Glucose 235 (H) 65 - 99 mg/dL    BUN 17 8 - 23 mg/dL    Creatinine 1.35 (H) 0.76 - 1.27 mg/dL    Sodium 137 136 - 145 mmol/L    Potassium 4.4 3.5 - 5.2 mmol/L    Chloride 100 98 - 107 mmol/L    CO2 22.0 22.0 - 29.0 mmol/L    Calcium 9.2 8.6 - 10.5 mg/dL    Total Protein 6.5 6.0 - 8.5 g/dL    Albumin 3.7 3.5 - 5.2 g/dL    ALT (SGPT) 45 (H) 1 - 41 U/L    AST (SGOT) 34 1 - 40 U/L    Alkaline Phosphatase 114 39 - 117 U/L    Total Bilirubin 0.7 0.0 - 1.2 mg/dL    Globulin 2.8 gm/dL    A/G Ratio 1.3 g/dL    BUN/Creatinine Ratio 12.6 7.0 - 25.0    Anion Gap 15.0 5.0 - 15.0 mmol/L    eGFR 57.9 (L) >60.0 mL/min/1.73   ECG 12 Lead Dyspnea   Result Value Ref Range    QT Interval 348 ms    QTC Interval 462 ms   Results for orders placed or performed during the hospital encounter of 09/05/22   Gold Top - SST   Result Value Ref Range    Extra Tube Hold for add-ons.    Green Top (Gel)   Result Value Ref Range    Extra Tube Hold for add-ons.    Urinalysis With Microscopic If Indicated (No Culture) - Urine, Clean Catch    Specimen: Urine, Clean Catch   Result Value Ref Range    Color, UA Yellow Yellow, Straw, Dark Yellow, Connie    Appearance, UA Clear Clear    pH, UA 7.5 5.0 - 9.0    Specific Gravity, UA 1.025 1.003 - 1.030     Glucose, UA Negative Negative    Ketones, UA Negative Negative    Bilirubin, UA Negative Negative    Blood, UA Negative Negative    Protein, UA Negative Negative    Leuk Esterase, UA Negative Negative    Nitrite, UA Negative Negative    Urobilinogen, UA 1.0 E.U./dL 0.2 - 1.0 E.U./dL   CBC Auto Differential    Specimen: Blood   Result Value Ref Range    WBC 5.45 3.40 - 10.80 10*3/mm3    RBC 4.53 4.14 - 5.80 10*6/mm3    Hemoglobin 15.0 13.0 - 17.7 g/dL    Hematocrit 46.4 37.5 - 51.0 %    .4 (H) 79.0 - 97.0 fL    MCH 33.1 (H) 26.6 - 33.0 pg    MCHC 32.3 31.5 - 35.7 g/dL    RDW 12.8 12.3 - 15.4 %    RDW-SD 48.8 37.0 - 54.0 fl    MPV 10.4 6.0 - 12.0 fL    Platelets 168 140 - 450 10*3/mm3    Neutrophil % 51.5 42.7 - 76.0 %    Lymphocyte % 35.8 19.6 - 45.3 %    Monocyte % 10.3 5.0 - 12.0 %    Eosinophil % 1.5 0.3 - 6.2 %    Basophil % 0.7 0.0 - 1.5 %    Immature Grans % 0.2 0.0 - 0.5 %    Neutrophils, Absolute 2.81 1.70 - 7.00 10*3/mm3    Lymphocytes, Absolute 1.95 0.70 - 3.10 10*3/mm3    Monocytes, Absolute 0.56 0.10 - 0.90 10*3/mm3    Eosinophils, Absolute 0.08 0.00 - 0.40 10*3/mm3    Basophils, Absolute 0.04 0.00 - 0.20 10*3/mm3    Immature Grans, Absolute 0.01 0.00 - 0.05 10*3/mm3    nRBC 0.0 0.0 - 0.2 /100 WBC     *Note: Due to a large number of results and/or encounters for the requested time period, some results have not been displayed. A complete set of results can be found in Results Review.         This document has been electronically signed by Jhonny King MD on May 8, 2023 16:10 CDT

## 2023-07-03 ENCOUNTER — HOSPITAL ENCOUNTER (INPATIENT)
Facility: HOSPITAL | Age: 68
LOS: 5 days | Discharge: HOME | DRG: 418 | End: 2023-07-08
Attending: EMERGENCY MEDICINE | Admitting: HOSPITALIST
Payer: MEDICARE

## 2023-07-03 ENCOUNTER — APPOINTMENT (OUTPATIENT)
Dept: MRI IMAGING | Facility: HOSPITAL | Age: 68
DRG: 418 | End: 2023-07-03
Payer: MEDICARE

## 2023-07-03 ENCOUNTER — APPOINTMENT (OUTPATIENT)
Dept: GENERAL RADIOLOGY | Facility: HOSPITAL | Age: 68
DRG: 418 | End: 2023-07-03
Payer: MEDICARE

## 2023-07-03 ENCOUNTER — APPOINTMENT (OUTPATIENT)
Dept: CT IMAGING | Facility: HOSPITAL | Age: 68
DRG: 418 | End: 2023-07-03
Payer: MEDICARE

## 2023-07-03 DIAGNOSIS — K80.21 CALCULUS OF GALLBLADDER WITH BILIARY OBSTRUCTION BUT WITHOUT CHOLECYSTITIS: ICD-10-CM

## 2023-07-03 DIAGNOSIS — K85.90 ACUTE PANCREATITIS, UNSPECIFIED COMPLICATION STATUS, UNSPECIFIED PANCREATITIS TYPE: Primary | ICD-10-CM

## 2023-07-03 LAB
ALBUMIN SERPL-MCNC: 3.9 G/DL (ref 3.5–5.2)
ALBUMIN/GLOB SERPL: 1 G/DL
ALP SERPL-CCNC: 294 U/L (ref 39–117)
ALT SERPL W P-5'-P-CCNC: 313 U/L (ref 1–41)
ANION GAP SERPL CALCULATED.3IONS-SCNC: 9 MMOL/L (ref 5–15)
AST SERPL-CCNC: 196 U/L (ref 1–40)
BACTERIA UR QL AUTO: ABNORMAL /HPF
BASOPHILS # BLD AUTO: 0.03 10*3/MM3 (ref 0–0.2)
BASOPHILS NFR BLD AUTO: 0.4 % (ref 0–1.5)
BILIRUB SERPL-MCNC: 2.7 MG/DL (ref 0–1.2)
BILIRUB UR QL STRIP: NEGATIVE
BUN SERPL-MCNC: 13 MG/DL (ref 8–23)
BUN/CREAT SERPL: 16 (ref 7–25)
CALCIUM SPEC-SCNC: 9.3 MG/DL (ref 8.6–10.5)
CHLORIDE SERPL-SCNC: 108 MMOL/L (ref 98–107)
CLARITY UR: CLEAR
CO2 SERPL-SCNC: 22 MMOL/L (ref 22–29)
COLOR UR: YELLOW
CREAT SERPL-MCNC: 0.81 MG/DL (ref 0.76–1.27)
D-LACTATE SERPL-SCNC: 0.9 MMOL/L (ref 0.5–2)
DEPRECATED RDW RBC AUTO: 56.8 FL (ref 37–54)
EGFRCR SERPLBLD CKD-EPI 2021: 97.2 ML/MIN/1.73
EOSINOPHIL # BLD AUTO: 0.07 10*3/MM3 (ref 0–0.4)
EOSINOPHIL NFR BLD AUTO: 0.9 % (ref 0.3–6.2)
ERYTHROCYTE [DISTWIDTH] IN BLOOD BY AUTOMATED COUNT: 15.5 % (ref 12.3–15.4)
GEN 5 2HR TROPONIN T REFLEX: 7 NG/L
GLOBULIN UR ELPH-MCNC: 3.8 GM/DL
GLUCOSE BLDC GLUCOMTR-MCNC: 108 MG/DL (ref 70–130)
GLUCOSE BLDC GLUCOMTR-MCNC: 84 MG/DL (ref 70–130)
GLUCOSE BLDC GLUCOMTR-MCNC: 95 MG/DL (ref 70–130)
GLUCOSE SERPL-MCNC: 141 MG/DL (ref 65–99)
GLUCOSE UR STRIP-MCNC: NEGATIVE MG/DL
HCT VFR BLD AUTO: 46.5 % (ref 37.5–51)
HGB BLD-MCNC: 15.1 G/DL (ref 13–17.7)
HGB UR QL STRIP.AUTO: ABNORMAL
HOLD SPECIMEN: NORMAL
HYALINE CASTS UR QL AUTO: ABNORMAL /LPF
IMM GRANULOCYTES # BLD AUTO: 0.02 10*3/MM3 (ref 0–0.05)
IMM GRANULOCYTES NFR BLD AUTO: 0.2 % (ref 0–0.5)
KETONES UR QL STRIP: NEGATIVE
LEUKOCYTE ESTERASE UR QL STRIP.AUTO: NEGATIVE
LIPASE SERPL-CCNC: >3000 U/L (ref 13–60)
LYMPHOCYTES # BLD AUTO: 1.16 10*3/MM3 (ref 0.7–3.1)
LYMPHOCYTES NFR BLD AUTO: 14.4 % (ref 19.6–45.3)
MCH RBC QN AUTO: 31.8 PG (ref 26.6–33)
MCHC RBC AUTO-ENTMCNC: 32.5 G/DL (ref 31.5–35.7)
MCV RBC AUTO: 97.9 FL (ref 79–97)
MONOCYTES # BLD AUTO: 0.55 10*3/MM3 (ref 0.1–0.9)
MONOCYTES NFR BLD AUTO: 6.8 % (ref 5–12)
NEUTROPHILS NFR BLD AUTO: 6.25 10*3/MM3 (ref 1.7–7)
NEUTROPHILS NFR BLD AUTO: 77.3 % (ref 42.7–76)
NITRITE UR QL STRIP: NEGATIVE
NRBC BLD AUTO-RTO: 0 /100 WBC (ref 0–0.2)
NT-PROBNP SERPL-MCNC: 61.6 PG/ML (ref 0–900)
PH UR STRIP.AUTO: <=5 [PH] (ref 5–9)
PLATELET # BLD AUTO: 156 10*3/MM3 (ref 140–450)
PMV BLD AUTO: 12.2 FL (ref 6–12)
POTASSIUM SERPL-SCNC: 3.9 MMOL/L (ref 3.5–5.2)
PROT SERPL-MCNC: 7.7 G/DL (ref 6–8.5)
PROT UR QL STRIP: NEGATIVE
RBC # BLD AUTO: 4.75 10*6/MM3 (ref 4.14–5.8)
RBC # UR STRIP: ABNORMAL /HPF
REF LAB TEST METHOD: ABNORMAL
SODIUM SERPL-SCNC: 139 MMOL/L (ref 136–145)
SP GR UR STRIP: 1.03 (ref 1–1.03)
SQUAMOUS #/AREA URNS HPF: ABNORMAL /HPF
TROPONIN T DELTA: 0 NG/L
TROPONIN T SERPL HS-MCNC: 7 NG/L
UROBILINOGEN UR QL STRIP: ABNORMAL
WBC # UR STRIP: ABNORMAL /HPF
WBC NRBC COR # BLD: 8.08 10*3/MM3 (ref 3.4–10.8)
WHOLE BLOOD HOLD COAG: NORMAL

## 2023-07-03 PROCEDURE — 25010000002 ENOXAPARIN PER 10 MG: Performed by: HOSPITALIST

## 2023-07-03 PROCEDURE — 74181 MRI ABDOMEN W/O CONTRAST: CPT

## 2023-07-03 PROCEDURE — 81001 URINALYSIS AUTO W/SCOPE: CPT | Performed by: EMERGENCY MEDICINE

## 2023-07-03 PROCEDURE — 74177 CT ABD & PELVIS W/CONTRAST: CPT

## 2023-07-03 PROCEDURE — 25010000002 ONDANSETRON PER 1 MG: Performed by: EMERGENCY MEDICINE

## 2023-07-03 PROCEDURE — 85025 COMPLETE CBC W/AUTO DIFF WBC: CPT | Performed by: EMERGENCY MEDICINE

## 2023-07-03 PROCEDURE — 83605 ASSAY OF LACTIC ACID: CPT | Performed by: EMERGENCY MEDICINE

## 2023-07-03 PROCEDURE — 99285 EMERGENCY DEPT VISIT HI MDM: CPT

## 2023-07-03 PROCEDURE — 25010000002 MORPHINE PER 10 MG: Performed by: EMERGENCY MEDICINE

## 2023-07-03 PROCEDURE — 25510000001 IOPAMIDOL 61 % SOLUTION: Performed by: EMERGENCY MEDICINE

## 2023-07-03 PROCEDURE — 25010000002 HYDROMORPHONE 1 MG/ML SOLUTION: Performed by: EMERGENCY MEDICINE

## 2023-07-03 PROCEDURE — 83880 ASSAY OF NATRIURETIC PEPTIDE: CPT | Performed by: EMERGENCY MEDICINE

## 2023-07-03 PROCEDURE — 99222 1ST HOSP IP/OBS MODERATE 55: CPT | Performed by: INTERNAL MEDICINE

## 2023-07-03 PROCEDURE — 93005 ELECTROCARDIOGRAM TRACING: CPT | Performed by: EMERGENCY MEDICINE

## 2023-07-03 PROCEDURE — 36415 COLL VENOUS BLD VENIPUNCTURE: CPT

## 2023-07-03 PROCEDURE — 82948 REAGENT STRIP/BLOOD GLUCOSE: CPT

## 2023-07-03 PROCEDURE — 83690 ASSAY OF LIPASE: CPT | Performed by: EMERGENCY MEDICINE

## 2023-07-03 PROCEDURE — 25010000002 MORPHINE PER 10 MG: Performed by: HOSPITALIST

## 2023-07-03 PROCEDURE — 84484 ASSAY OF TROPONIN QUANT: CPT | Performed by: EMERGENCY MEDICINE

## 2023-07-03 PROCEDURE — 93010 ELECTROCARDIOGRAM REPORT: CPT | Performed by: INTERNAL MEDICINE

## 2023-07-03 PROCEDURE — 80053 COMPREHEN METABOLIC PANEL: CPT | Performed by: EMERGENCY MEDICINE

## 2023-07-03 RX ORDER — ONDANSETRON 2 MG/ML
4 INJECTION INTRAMUSCULAR; INTRAVENOUS ONCE
Status: COMPLETED | OUTPATIENT
Start: 2023-07-03 | End: 2023-07-03

## 2023-07-03 RX ORDER — TAMSULOSIN HYDROCHLORIDE 0.4 MG/1
0.4 CAPSULE ORAL DAILY
Status: DISCONTINUED | OUTPATIENT
Start: 2023-07-03 | End: 2023-07-08 | Stop reason: HOSPADM

## 2023-07-03 RX ORDER — GLUCAGON 1 MG/ML
1 KIT INJECTION
Status: DISCONTINUED | OUTPATIENT
Start: 2023-07-03 | End: 2023-07-08 | Stop reason: HOSPADM

## 2023-07-03 RX ORDER — ENOXAPARIN SODIUM 100 MG/ML
40 INJECTION SUBCUTANEOUS DAILY
Status: DISCONTINUED | OUTPATIENT
Start: 2023-07-03 | End: 2023-07-06

## 2023-07-03 RX ORDER — SODIUM CHLORIDE 0.9 % (FLUSH) 0.9 %
10 SYRINGE (ML) INJECTION AS NEEDED
Status: DISCONTINUED | OUTPATIENT
Start: 2023-07-03 | End: 2023-07-08 | Stop reason: HOSPADM

## 2023-07-03 RX ORDER — SODIUM CHLORIDE 9 MG/ML
40 INJECTION, SOLUTION INTRAVENOUS AS NEEDED
Status: CANCELLED | OUTPATIENT
Start: 2023-07-03

## 2023-07-03 RX ORDER — DEXTROSE AND SODIUM CHLORIDE 5; .45 G/100ML; G/100ML
30 INJECTION, SOLUTION INTRAVENOUS CONTINUOUS PRN
Status: CANCELLED | OUTPATIENT
Start: 2023-07-04

## 2023-07-03 RX ORDER — AMOXICILLIN 250 MG
2 CAPSULE ORAL 2 TIMES DAILY
Status: DISCONTINUED | OUTPATIENT
Start: 2023-07-03 | End: 2023-07-08 | Stop reason: HOSPADM

## 2023-07-03 RX ORDER — SODIUM CHLORIDE 0.9 % (FLUSH) 0.9 %
10 SYRINGE (ML) INJECTION EVERY 12 HOURS SCHEDULED
Status: DISCONTINUED | OUTPATIENT
Start: 2023-07-03 | End: 2023-07-08 | Stop reason: HOSPADM

## 2023-07-03 RX ORDER — SODIUM CHLORIDE 9 MG/ML
40 INJECTION, SOLUTION INTRAVENOUS AS NEEDED
Status: DISCONTINUED | OUTPATIENT
Start: 2023-07-03 | End: 2023-07-08 | Stop reason: HOSPADM

## 2023-07-03 RX ORDER — DEXTROSE MONOHYDRATE, SODIUM CHLORIDE, AND POTASSIUM CHLORIDE 50; 1.49; 4.5 G/1000ML; G/1000ML; G/1000ML
125 INJECTION, SOLUTION INTRAVENOUS CONTINUOUS
Status: DISCONTINUED | OUTPATIENT
Start: 2023-07-03 | End: 2023-07-08 | Stop reason: HOSPADM

## 2023-07-03 RX ORDER — ALBUTEROL SULFATE 2.5 MG/3ML
2.5 SOLUTION RESPIRATORY (INHALATION) EVERY 6 HOURS PRN
Status: DISCONTINUED | OUTPATIENT
Start: 2023-07-03 | End: 2023-07-08 | Stop reason: HOSPADM

## 2023-07-03 RX ORDER — POLYETHYLENE GLYCOL 3350 17 G/17G
17 POWDER, FOR SOLUTION ORAL DAILY PRN
Status: DISCONTINUED | OUTPATIENT
Start: 2023-07-03 | End: 2023-07-08 | Stop reason: HOSPADM

## 2023-07-03 RX ORDER — DIPHENOXYLATE HYDROCHLORIDE AND ATROPINE SULFATE 2.5; .025 MG/1; MG/1
1 TABLET ORAL DAILY
Status: DISCONTINUED | OUTPATIENT
Start: 2023-07-03 | End: 2023-07-08 | Stop reason: HOSPADM

## 2023-07-03 RX ORDER — NICOTINE POLACRILEX 4 MG
15 LOZENGE BUCCAL
Status: DISCONTINUED | OUTPATIENT
Start: 2023-07-03 | End: 2023-07-08 | Stop reason: HOSPADM

## 2023-07-03 RX ORDER — BISACODYL 5 MG/1
5 TABLET, DELAYED RELEASE ORAL DAILY PRN
Status: DISCONTINUED | OUTPATIENT
Start: 2023-07-03 | End: 2023-07-08 | Stop reason: HOSPADM

## 2023-07-03 RX ORDER — ASPIRIN 81 MG/1
81 TABLET, CHEWABLE ORAL DAILY
Status: DISCONTINUED | OUTPATIENT
Start: 2023-07-03 | End: 2023-07-08 | Stop reason: HOSPADM

## 2023-07-03 RX ORDER — ATORVASTATIN CALCIUM 10 MG/1
10 TABLET, FILM COATED ORAL DAILY
Status: DISCONTINUED | OUTPATIENT
Start: 2023-07-03 | End: 2023-07-08 | Stop reason: HOSPADM

## 2023-07-03 RX ORDER — SUCRALFATE 1 G/1
1 TABLET ORAL
Status: DISCONTINUED | OUTPATIENT
Start: 2023-07-03 | End: 2023-07-08 | Stop reason: HOSPADM

## 2023-07-03 RX ORDER — ALBUTEROL SULFATE 90 UG/1
2 AEROSOL, METERED RESPIRATORY (INHALATION) EVERY 6 HOURS PRN
Status: DISCONTINUED | OUTPATIENT
Start: 2023-07-03 | End: 2023-07-03 | Stop reason: SDUPTHER

## 2023-07-03 RX ORDER — INSULIN ASPART 100 [IU]/ML
0-9 INJECTION, SOLUTION INTRAVENOUS; SUBCUTANEOUS
Status: DISCONTINUED | OUTPATIENT
Start: 2023-07-03 | End: 2023-07-08 | Stop reason: HOSPADM

## 2023-07-03 RX ORDER — DEXTROSE MONOHYDRATE 25 G/50ML
25 INJECTION, SOLUTION INTRAVENOUS
Status: DISCONTINUED | OUTPATIENT
Start: 2023-07-03 | End: 2023-07-08 | Stop reason: HOSPADM

## 2023-07-03 RX ORDER — MELOXICAM 7.5 MG/1
7.5 TABLET ORAL DAILY
COMMUNITY

## 2023-07-03 RX ORDER — SODIUM CHLORIDE 9 MG/ML
125 INJECTION, SOLUTION INTRAVENOUS CONTINUOUS
Status: DISCONTINUED | OUTPATIENT
Start: 2023-07-03 | End: 2023-07-03

## 2023-07-03 RX ORDER — BISACODYL 10 MG
10 SUPPOSITORY, RECTAL RECTAL DAILY PRN
Status: DISCONTINUED | OUTPATIENT
Start: 2023-07-03 | End: 2023-07-08 | Stop reason: HOSPADM

## 2023-07-03 RX ORDER — ONDANSETRON 2 MG/ML
4 INJECTION INTRAMUSCULAR; INTRAVENOUS EVERY 6 HOURS PRN
Status: DISCONTINUED | OUTPATIENT
Start: 2023-07-03 | End: 2023-07-08 | Stop reason: HOSPADM

## 2023-07-03 RX ADMIN — THERA TABS 1 TABLET: TAB at 13:03

## 2023-07-03 RX ADMIN — MORPHINE SULFATE 4 MG: 4 INJECTION, SOLUTION INTRAMUSCULAR; INTRAVENOUS at 08:47

## 2023-07-03 RX ADMIN — DOCUSATE SODIUM 50 MG AND SENNOSIDES 8.6 MG 2 TABLET: 8.6; 5 TABLET, FILM COATED ORAL at 21:20

## 2023-07-03 RX ADMIN — HYDROMORPHONE HYDROCHLORIDE 0.5 MG: 1 INJECTION, SOLUTION INTRAMUSCULAR; INTRAVENOUS; SUBCUTANEOUS at 10:35

## 2023-07-03 RX ADMIN — MORPHINE SULFATE 4 MG: 4 INJECTION, SOLUTION INTRAMUSCULAR; INTRAVENOUS at 21:20

## 2023-07-03 RX ADMIN — Medication 10 ML: at 21:19

## 2023-07-03 RX ADMIN — SODIUM CHLORIDE 125 ML/HR: 9 INJECTION, SOLUTION INTRAVENOUS at 10:36

## 2023-07-03 RX ADMIN — SUCRALFATE 1 G: 1 TABLET ORAL at 13:03

## 2023-07-03 RX ADMIN — TAMSULOSIN HYDROCHLORIDE 0.4 MG: 0.4 CAPSULE ORAL at 13:03

## 2023-07-03 RX ADMIN — SUCRALFATE 1 G: 1 TABLET ORAL at 21:20

## 2023-07-03 RX ADMIN — SUCRALFATE 1 G: 1 TABLET ORAL at 17:13

## 2023-07-03 RX ADMIN — SODIUM CHLORIDE, POTASSIUM CHLORIDE, SODIUM LACTATE AND CALCIUM CHLORIDE 1000 ML: 600; 310; 30; 20 INJECTION, SOLUTION INTRAVENOUS at 08:46

## 2023-07-03 RX ADMIN — IOPAMIDOL 90 ML: 612 INJECTION, SOLUTION INTRAVENOUS at 09:17

## 2023-07-03 RX ADMIN — ATORVASTATIN CALCIUM 10 MG: 10 TABLET, FILM COATED ORAL at 13:03

## 2023-07-03 RX ADMIN — ENOXAPARIN SODIUM 40 MG: 40 INJECTION SUBCUTANEOUS at 13:03

## 2023-07-03 RX ADMIN — POTASSIUM CHLORIDE, DEXTROSE MONOHYDRATE AND SODIUM CHLORIDE 125 ML/HR: 150; 5; 450 INJECTION, SOLUTION INTRAVENOUS at 17:55

## 2023-07-03 RX ADMIN — ASPIRIN 81 MG: 81 TABLET, CHEWABLE ORAL at 13:03

## 2023-07-03 RX ADMIN — ONDANSETRON 4 MG: 2 INJECTION INTRAMUSCULAR; INTRAVENOUS at 08:46

## 2023-07-03 RX ADMIN — MORPHINE SULFATE 4 MG: 4 INJECTION, SOLUTION INTRAMUSCULAR; INTRAVENOUS at 17:13

## 2023-07-03 NOTE — ED PROVIDER NOTES
Subjective   History of Present Illness  67 years old male with history of hypertension, hyperlipidemia, diabetes mellitus, BPH presented in the ER with chief complaint of abdominal pain.  Patient reports he woke up around 2 AM, had a bowel movement which was soft and later on started to have generalized abdominal pain and this morning nausea with 2 episodes of nonprojectile, nonbilious vomiting.  Patient thought he was constipated and took mag citrate which made his pain worse.  Currently rates 9/10 intensity generalized with some abdominal distention.  Does report some pressure sensation in the lower chest along with substernal burning after vomiting.  No difficulty breathing.    History provided by:  Patient      Review of Systems   Constitutional: Negative for chills and fever.   HENT: Negative for congestion, rhinorrhea, sinus pressure and sore throat.    Respiratory: Negative for chest tightness and shortness of breath.    Cardiovascular: Positive for chest pain. Negative for palpitations.   Gastrointestinal: Positive for abdominal pain, nausea and vomiting.   Genitourinary: Negative for flank pain.   Skin: Negative for color change.   Neurological: Negative for headaches.   Psychiatric/Behavioral: Negative for agitation.       Past Medical History:   Diagnosis Date   • BPH (benign prostatic hyperplasia)    • Diabetes mellitus    • Hypercholesterolemia    • Hyperlipidemia        Allergies   Allergen Reactions   • Celecoxib Confusion   • Corticosteroids Other (See Comments)     Pt states caused convulsions   • Penicillins Confusion       Past Surgical History:   Procedure Laterality Date   • COLONOSCOPY N/A 1/10/2022   • COLONOSCOPY N/A 1/4/2023    Procedure: COLONOSCOPY;  Surgeon: Jhonny King MD;  Location: [facility] ENDOSCOPY;  Service: Gastroenterology;  Laterality: N/A;   • ENDOSCOPY N/A 1/4/2023    Procedure: ESOPHAGOGASTRODUODENOSCOPY;  Surgeon: Jhonny King MD;  Location: [facility] ENDOSCOPY;   Service: Gastroenterology;  Laterality: N/A;   • ENDOSCOPY N/A 3/10/2023    Procedure: ESOPHAGOGASTRODUODENOSCOPY;  Surgeon: Jhonny King MD;  Location: [facility] ENDOSCOPY;  Service: Gastroenterology;  Laterality: N/A;   • JOINT REPLACEMENT     • KNEE ARTHROSCOPY Left    • TOTAL KNEE ARTHROPLASTY Right        Family History   Problem Relation Age of Onset   • Hypertension Mother    • Hypertension Father        Social History     Socioeconomic History   • Marital status: Single   Tobacco Use   • Smoking status: Some Days     Years: 30.00     Types: Cigarettes   • Smokeless tobacco: Never   Vaping Use   • Vaping Use: Never used   Substance and Sexual Activity   • Alcohol use: Yes     Comment: occ beer   • Drug use: Never   • Sexual activity: Defer           Objective   Physical Exam  Vitals and nursing note reviewed.   Constitutional:       Appearance: He is well-developed.   HENT:      Head: Normocephalic.      Mouth/Throat:      Mouth: Mucous membranes are moist.   Eyes:      Extraocular Movements: Extraocular movements intact.   Cardiovascular:      Rate and Rhythm: Normal rate and regular rhythm.   Pulmonary:      Effort: Pulmonary effort is normal.      Breath sounds: Normal breath sounds.   Abdominal:      General: Abdomen is protuberant. Bowel sounds are absent. There is distension.      Tenderness: There is generalized abdominal tenderness. There is guarding.   Skin:     General: Skin is warm.   Neurological:      General: No focal deficit present.      Mental Status: He is alert and oriented to person, place, and time.   Psychiatric:         Mood and Affect: Mood normal.         Procedures           ED Course                                           Medical Decision Making  67 years old male with history of hypertension, hyperlipidemia, diabetes mellitus, BPH presented in the ER with chief complaint of abdominal pain.  Patient reports he woke up around 2 AM, had a bowel movement which was soft and later  on started to have generalized abdominal pain and this morning nausea with 2 episodes of nonprojectile, nonbilious vomiting.  Patient thought he was constipated and took mag citrate which made his pain worse.  Currently rates 9/10 intensity generalized with some abdominal distention.  Does report some pressure sensation in the lower chest along with substernal burning after vomiting.  No difficulty breathing.    Amount and/or Complexity of Data Reviewed  Labs: ordered.  Radiology: ordered.      Risk  Prescription drug management.          Final diagnoses:   None       ED Disposition  ED Disposition     None          No follow-up provider specified.       Medication List      No changes were made to your prescriptions during this visit.        call office on Monday to verify appointment         Medication List        New Prescriptions      atorvastatin 10 MG tablet  Commonly known as: LIPITOR  Take 1 tablet by mouth Daily.  Replaces: lovastatin 40 MG tablet            Stop      lovastatin 40 MG tablet  Commonly known as: MEVACOR  Replaced by: atorvastatin 10 MG tablet               Where to Get Your Medications        These medications were sent to [place], [place] - [address] [facility] - [phone] [facility] [phone]   [address] [facility] [address]      Phone: [phone]   atorvastatin 10 MG tablet          Martinez Khan MD  07/26/23 7108

## 2023-07-03 NOTE — CONSULTS
SUBJECTIVE:   7/3/2023    Name: Scott Scanlon  DOD: 1955    REASON FOR CONSULT: Acute pancreatitis    Chief Complaint:     Chief Complaint   Patient presents with   • Abdominal Pain       Subjective     Patient is 67 y.o. male with past medical history of diabetes mellitus, hypercholesterolemia, hyperlipidemia, BPH, GERD presents with acute onset epigastric pain with nausea and vomiting since morning.  Denied diarrhea, constipation, rectal bleeding or weight loss.  Noted to have elevated liver enzymes and lipase.  CT abdomen and pelvis was consistent with acute pancreatitis and cholelithiasis.  Patient drinks sixpack beer per week.  MRCP today was unremarkable for CBD pathology.  WBC and hemoglobin is normal.     ROS/HISTORY/ CURRENT MEDICATIONS/OBJECTIVE/VS/PE:   Review of Systems:   Review of Systems   Constitutional: Negative for chills, fatigue, fever and unexpected weight change.   HENT: Negative for congestion, ear discharge, hearing loss, nosebleeds and sore throat.    Eyes: Negative for pain, discharge and redness.   Respiratory: Negative for cough, chest tightness, shortness of breath and wheezing.    Cardiovascular: Negative for chest pain and palpitations.   Gastrointestinal: Positive for abdominal pain, nausea and vomiting. Negative for abdominal distention, blood in stool, constipation and diarrhea.   Endocrine: Negative for cold intolerance, polydipsia, polyphagia and polyuria.   Genitourinary: Negative for dysuria, flank pain, frequency, hematuria and urgency.   Musculoskeletal: Negative for arthralgias, back pain, joint swelling and myalgias.   Skin: Negative for color change, pallor and rash.   Neurological: Negative for tremors, seizures, syncope, weakness and headaches.   Hematological: Negative for adenopathy. Does not bruise/bleed easily.   Psychiatric/Behavioral: Negative for behavioral problems, confusion, dysphoric mood, hallucinations and suicidal ideas. The patient is not  nervous/anxious.        History:     Past Medical History:   Diagnosis Date   • BPH (benign prostatic hyperplasia)    • Diabetes mellitus    • Hypercholesterolemia    • Hyperlipidemia      Past Surgical History:   Procedure Laterality Date   • COLONOSCOPY N/A 1/10/2022   • COLONOSCOPY N/A 1/4/2023    Procedure: COLONOSCOPY;  Surgeon: Jhonny King MD;  Location: [facility] ENDOSCOPY;  Service: Gastroenterology;  Laterality: N/A;   • ENDOSCOPY N/A 1/4/2023    Procedure: ESOPHAGOGASTRODUODENOSCOPY;  Surgeon: Jhonny King MD;  Location: [facility] ENDOSCOPY;  Service: Gastroenterology;  Laterality: N/A;   • ENDOSCOPY N/A 3/10/2023    Procedure: ESOPHAGOGASTRODUODENOSCOPY;  Surgeon: Jhonny King MD;  Location: [facility] ENDOSCOPY;  Service: Gastroenterology;  Laterality: N/A;   • JOINT REPLACEMENT     • KNEE ARTHROSCOPY Left    • TOTAL KNEE ARTHROPLASTY Right      Family History   Problem Relation Age of Onset   • Hypertension Mother    • Hypertension Father      Social History     Tobacco Use   • Smoking status: Some Days     Years: 30.00     Types: Cigarettes   • Smokeless tobacco: Never   Vaping Use   • Vaping Use: Never used   Substance Use Topics   • Alcohol use: Yes     Comment: occ beer   • Drug use: Never     Medications Prior to Admission   Medication Sig Dispense Refill Last Dose   • albuterol sulfate  (90 Base) MCG/ACT inhaler Inhale 2 puffs.   7/3/2023   • aspirin 81 MG chewable tablet Chew 1 tablet Daily.   Past Week   • cholecalciferol (VITAMIN D3) 1.25 MG (07809 UT) capsule Take 1 capsule by mouth 1 (One) Time Per Week.   7/2/2023   • lovastatin (MEVACOR) 40 MG tablet Take 1 tablet by mouth every night at bedtime.   7/2/2023   • meloxicam (MOBIC) 7.5 MG tablet Take 1 tablet by mouth Daily.   7/2/2023   • metFORMIN (GLUCOPHAGE) 1000 MG tablet Take 1 tablet by mouth.   7/2/2023   • multivitamin (THERAGRAN) tablet tablet Take 1 tablet by mouth Daily.   7/2/2023   • sucralfate (CARAFATE) 1 g  tablet Take 1 tablet by mouth 4 (Four) Times a Day Before Meals & at Bedtime As Needed.   7/3/2023   • tamsulosin (FLOMAX) 0.4 MG capsule 24 hr capsule Take 1 capsule by mouth Daily.   7/2/2023     Allergies:  Celecoxib, Corticosteroids, and Penicillins    I have reviewed the patient's medical history, surgical history and family history in the available medical record system.     Current Medications:     Current Facility-Administered Medications   Medication Dose Route Frequency Provider Last Rate Last Admin   • albuterol (PROVENTIL) nebulizer solution 0.083% 2.5 mg/3mL  2.5 mg Nebulization Q6H PRN Clifford Cannon MD       • aspirin chewable tablet 81 mg  81 mg Oral Daily Clifford Cannon MD   81 mg at 07/03/23 1303   • atorvastatin (LIPITOR) tablet 10 mg  10 mg Oral Daily Clifford Cannon MD   10 mg at 07/03/23 1303   • sennosides-docusate (PERICOLACE) 8.6-50 MG per tablet 2 tablet  2 tablet Oral BID Clifford Cannon MD        And   • polyethylene glycol (MIRALAX) packet 17 g  17 g Oral Daily PRN Clifford Cannon MD        And   • bisacodyl (DULCOLAX) EC tablet 5 mg  5 mg Oral Daily PRN Clifford Cannon MD        And   • bisacodyl (DULCOLAX) suppository 10 mg  10 mg Rectal Daily PRN Clifford Cannon MD       • dextrose (D50W) (25 g/50 mL) IV injection 25 g  25 g Intravenous Q15 Min PRN Clifford Cannon MD       • dextrose (GLUTOSE) oral gel 15 g  15 g Oral Q15 Min PRN Clifford Cannon MD       • Enoxaparin Sodium (LOVENOX) syringe 40 mg  40 mg Subcutaneous Daily Clifford Cannon MD   40 mg at 07/03/23 1303   • glucagon HCl (Diagnostic) injection 1 mg  1 mg Intramuscular Q15 Min PRN Clifford Cannon MD       • Insulin Aspart (novoLOG) injection 0-9 Units  0-9 Units Subcutaneous TID AC Clifford Cannon MD       • lactated ringers bolus 1,000 mL  1,000 mL Intravenous Once Clifford Cannon MD       • morphine injection 4 mg  4 mg Intravenous Q4H PRN Clifford Cannon MD       • multivitamin (THERAGRAN) tablet 1 tablet  1 tablet Oral Daily Clifford Cannon MD   1  tablet at 07/03/23 1303   • ondansetron (ZOFRAN) injection 4 mg  4 mg Intravenous Q6H PRN Clifford Cannon MD       • sodium chloride 0.9 % flush 10 mL  10 mL Intravenous Q12H Clifford Cannon MD       • sodium chloride 0.9 % flush 10 mL  10 mL Intravenous PRN Clifford Cannon MD       • sodium chloride 0.9 % infusion 40 mL  40 mL Intravenous PRN Clifford Cannon MD       • sodium chloride 0.9 % infusion  125 mL/hr Intravenous Continuous Clifford Cannon  mL/hr at 07/03/23 1036 125 mL/hr at 07/03/23 1036   • sucralfate (CARAFATE) tablet 1 g  1 g Oral 4x Daily AC & at Bedtime Clifford Cannon MD   1 g at 07/03/23 1303   • tamsulosin (FLOMAX) 24 hr capsule 0.4 mg  0.4 mg Oral Daily Clifford Cannon MD   0.4 mg at 07/03/23 1303       Objective     Physical Exam:   Temp:  [97.7 °F (36.5 °C)-99.3 °F (37.4 °C)] 99.3 °F (37.4 °C)  Heart Rate:  [58-64] 61  Resp:  [18-20] 18  BP: (158-174)/(83-90) 158/90    Physical Exam:  General Appearance:    Alert, cooperative, in no acute distress   Head:    Normocephalic, without obvious abnormality, atraumatic   Eyes:            Lids and lashes normal, conjunctivae and sclerae normal, no   icterus, no pallor, corneas clear, PERRLA   Ears:    Ears appear intact with no abnormalities noted   Throat:   No oral lesions, no thrush, oral mucosa moist   Neck:   No adenopathy, supple, trachea midline, no thyromegaly, no     carotid bruit, no JVD   Back:     No kyphosis present, no scoliosis present, no skin lesions,       erythema or scars, no tenderness to percussion or                   palpation,   range of motion normal   Lungs:     Clear to auscultation,respirations regular, even and                   unlabored    Heart:    Regular rhythm and normal rate, normal S1 and S2, no            murmur, no gallop, no rub, no click   Breast Exam:    Deferred   Abdomen:     Normal bowel sounds, no masses, no organomegaly, soft        nontender, nondistended, no guarding, no rebound                 tenderness    Genitalia:    Deferred   Extremities:   Moves all extremities well, no edema, no cyanosis, no              redness   Pulses:   Pulses palpable and equal bilaterally   Skin:   No bleeding, bruising or rash   Lymph nodes:   No palpable adenopathy   Neurologic:   Cranial nerves 2 - 12 grossly intact, sensation intact, DTR        present and equal bilaterally      Results Review:     Lab Results   Component Value Date    WBC 8.08 07/03/2023    WBC 11.99 (H) 12/28/2022    WBC 5.45 09/05/2022    HGB 15.1 07/03/2023    HGB 13.5 12/28/2022    HGB 15.0 09/05/2022    HCT 46.5 07/03/2023    HCT 41.4 12/28/2022    HCT 46.4 09/05/2022     07/03/2023     12/28/2022     09/05/2022     Results from last 7 days   Lab Units 07/03/23  0835   ALK PHOS U/L 294*   ALT (SGPT) U/L 313*   AST (SGOT) U/L 196*     Results from last 7 days   Lab Units 07/03/23  0835   BILIRUBIN mg/dL 2.7*   ALK PHOS U/L 294*     Lipase   Date Value Ref Range Status   07/03/2023 >3,000 (H) 13 - 60 U/L Final     Lab Results   Component Value Date    INR 1.23 (H) 01/24/2022         Radiology Review:  Imaging Results (Last 72 Hours)       Procedure Component Value Units Date/Time    CT Abdomen Pelvis With Contrast [[id number]] Collected: 07/03/23 0925     Updated: 07/03/23 1025    Narrative:      History:  Abdominal pain.    comparison:  none.    TECHNIQUE:  Intravenous contrast was administered and axial images from the level of the  diaphragms through the pelvis were performed followed by 2D multiplanar  reformats.    FINDINGS:  The lung bases are clear. There is cholelithiasis. The liver, spleen, kidneys  and adrenals are normal. There is peripancreatic inflammatory change consistent  with acute pancreatitis. There are no dilated loops of bowel.      Impression:      1. Peripancreatic inflammatory change involving the pancreatic tail suggesting  acute pancreatitis.    2. Cholelithiasis.            I reviewed the patient's new clinical  results.    I reviewed the patient's new imaging results and agree with the interpretation.     ASSESSMENT/PLAN:   ASSESSMENT: 1.  Acute pancreatitis, likely due to gallstone passage.  MRCP was unremarkable for CBD stone.  Could also be due to alcohol usage.  2.  GERD, well controlled.  3.  Alcohol usage  PLAN: 1.  Continue bowel rest, pain management, antiemetics and PPI  2.  Surgery consult for cholecystectomy  3.  CIWA protocol  4.  Recommend strict alcohol cessation  The risks, benefits, and alternatives of this procedure have been discussed with the patient or the responsible party. The patient understands and agrees to proceed.         Jhonny King MD  07/03/23  13:50 CDT

## 2023-07-03 NOTE — H&P
[facility] Medicine  HISTORY AND PHYSICAL    Date of Admission: 7/3/2023  Primary Care Physician: Gita Barnard APRN    Subjective     Chief Complaint: nausea, vomiting, upper abdominal pain    History of Present Illness  He was seen in the ED while waiting for a floor bed. He came to the ED with  generalized Abdominal pain (most in upper abdomen), nausea, vomiting after having a BM middle of the night, he went back to sleep, then woke up since his wife had to go to work, started having some symptoms tried to have another BM but couldn't. Tried to sleep it off afterwards but couldn't. Went to work but started feeling worse with pain and was told to go to the hospital for evaluation. No fever or chills when seen, breathing stable, no issues with voiding at this time. He didn't have anything like this before.     Review of Systems   Constitutional:  Positive for appetite change and fatigue.   HENT: Negative.     Eyes: Negative.    Respiratory: Negative.     Cardiovascular: Negative.    Gastrointestinal:  Positive for abdominal pain, nausea and vomiting.   Endocrine: Negative.    Genitourinary: Negative.    Musculoskeletal:  Positive for myalgias.   Skin: Negative.    Allergic/Immunologic: Negative.    Neurological: Negative.    Hematological: Negative.    Psychiatric/Behavioral: Negative.      Otherwise complete ROS reviewed and negative except as mentioned in the HPI.    Past Medical History:   Past Medical History:   Diagnosis Date    BPH (benign prostatic hyperplasia)     Diabetes mellitus     Hypercholesterolemia     Hyperlipidemia      Past Surgical History:  Past Surgical History:   Procedure Laterality Date    COLONOSCOPY N/A 1/10/2022    COLONOSCOPY N/A 1/4/2023    Procedure: COLONOSCOPY;  Surgeon: Jhonny King MD;  Location: [facility] ENDOSCOPY;  Service: Gastroenterology;  Laterality: N/A;    ENDOSCOPY N/A 1/4/2023    Procedure:  ESOPHAGOGASTRODUODENOSCOPY;  Surgeon: Jhonny King MD;  Location: [facility] ENDOSCOPY;  Service: Gastroenterology;  Laterality: N/A;    ENDOSCOPY N/A 3/10/2023    Procedure: ESOPHAGOGASTRODUODENOSCOPY;  Surgeon: Jhonny King MD;  Location: [facility] ENDOSCOPY;  Service: Gastroenterology;  Laterality: N/A;    JOINT REPLACEMENT      KNEE ARTHROSCOPY Left     TOTAL KNEE ARTHROPLASTY Right      Social History:  reports that he has been smoking cigarettes. He has never used smokeless tobacco. He reports current alcohol use. He reports that he does not use drugs.    Family History: family history includes Hypertension in his father and mother.     Allergies:  Allergies   Allergen Reactions    Celecoxib Confusion    Corticosteroids Other (See Comments)     Pt states caused convulsions    Penicillins Confusion     Medications:  Prior to Admission medications    Medication Sig Start Date End Date Taking? Authorizing Provider   albuterol sulfate  (90 Base) MCG/ACT inhaler Inhale 2 puffs. 7/6/22   Glory Ramos MD   aspirin 81 MG chewable tablet Chew 1 tablet Daily. 1/29/22   Aj Parks MD   cholecalciferol (VITAMIN D3) 1.25 MG (20392 UT) capsule Take 1 capsule by mouth 1 (One) Time Per Week. 7/6/22   Glory Ramos MD   clindamycin (CLEOCIN) 300 MG capsule Take 1 capsule by mouth 3 (Three) Times a Day. 5/4/23   Matthew Lara MD   lovastatin (MEVACOR) 40 MG tablet Take 1 tablet by mouth every night at bedtime. 7/6/22   Glory Ramos MD   metFORMIN (GLUCOPHAGE) 1000 MG tablet Take 1 tablet by mouth. 7/6/22   Glory Ramos MD   multivitamin (THERAGRAN) tablet tablet Take 1 tablet by mouth Daily.    Glory Ramos MD   sucralfate (CARAFATE) 1 g tablet Take 1 tablet by mouth 4 (Four) Times a Day Before Meals & at Bedtime As Needed. 2/7/23   Glory Ramos MD   tamsulosin (FLOMAX) 0.4 MG capsule 24 hr capsule Take 1 capsule by mouth Daily. 1/29/22   Kasey  "MD Aj     I have utilized all available immediate resources to obtain, update, and review the patient's current medications.    Objective     Vital Signs: /83   Pulse 58   Temp 97.7 °F (36.5 °C) (Oral)   Resp 20   Ht 182.9 cm (72\")   Wt 112 kg (246 lb)   SpO2 97%   BMI 33.36 kg/m²   Physical Exam   Laying in bed, NAD  PERRL, EOMI  Mucosae moist  Neck supple  Breathing stable  Asymptomatic bradycardia, hypertensive  Upper segment abdominal tenderness. ND  Moving all limbs  Mild back tenderness  Intact skin  No new focality    Results Reviewed:  Lab Results (last 24 hours)       Procedure Component Value Units Date/Time    Urinalysis With Culture If Indicated - Urine, Clean Catch [[id number]]  (Abnormal) Collected: 07/03/23 0943    Specimen: Urine, Clean Catch Updated: 07/03/23 1003     Color, UA Yellow     Appearance, UA Clear     pH, UA <=5.0     Specific Gravity, UA 1.026     Glucose, UA Negative     Ketones, UA Negative     Bilirubin, UA Negative     Blood, UA Small (1+)     Protein, UA Negative     Leuk Esterase, UA Negative     Nitrite, UA Negative     Urobilinogen, UA 1.0 E.U./dL    Narrative:      In absence of clinical symptoms, the presence of pyuria, bacteria, and/or nitrites on the urinalysis result does not correlate with infection.    Urinalysis, Microscopic Only - Urine, Clean Catch [[id number]]  (Abnormal) Collected: 07/03/23 0943    Specimen: Urine, Clean Catch Updated: 07/03/23 1003     RBC, UA 0-2 /HPF      WBC, UA 0-2 /HPF      Comment: Urine culture not indicated.        Bacteria, UA None Seen /HPF      Squamous Epithelial Cells, UA 0-2 /HPF      Hyaline Casts, UA None Seen /LPF      Methodology Automated Microscopy    Lipase [[id number]]  (Abnormal) Collected: 07/03/23 0835    Specimen: Blood Updated: 07/03/23 0912     Lipase >3,000 U/L     Comprehensive Metabolic Panel [[id number]]  (Abnormal) Collected: 07/03/23 0835    Specimen: Blood Updated: 07/03/23 0904     Glucose 141 mg/dL  "     BUN 13 mg/dL      Creatinine 0.81 mg/dL      Sodium 139 mmol/L      Potassium 3.9 mmol/L      Chloride 108 mmol/L      CO2 22.0 mmol/L      Calcium 9.3 mg/dL      Total Protein 7.7 g/dL      Albumin 3.9 g/dL      ALT (SGPT) 313 U/L      AST (SGOT) 196 U/L      Alkaline Phosphatase 294 U/L      Total Bilirubin 2.7 mg/dL      Globulin 3.8 gm/dL      A/G Ratio 1.0 g/dL      BUN/Creatinine Ratio 16.0     Anion Gap 9.0 mmol/L      eGFR 97.2 mL/min/1.73     Narrative:      GFR Normal >60  Chronic Kidney Disease <60  Kidney Failure <15      High Sensitivity Troponin T [[id number]]  (Normal) Collected: 07/03/23 0835    Specimen: Blood Updated: 07/03/23 0904     HS Troponin T 7 ng/L     Narrative:      High Sensitive Troponin T Reference Range:  <10.0 ng/L- Negative Female for AMI  <15.0 ng/L- Negative Male for AMI  >=10 - Abnormal Female indicating possible myocardial injury.  >=15 - Abnormal Male indicating possible myocardial injury.   Clinicians would have to utilize clinical acumen, EKG, Troponin, and serial changes to determine if it is an Acute Myocardial Infarction or myocardial injury due to an underlying chronic condition.         BNP [[id number]]  (Normal) Collected: 07/03/23 0835    Specimen: Blood Updated: 07/03/23 0902     proBNP 61.6 pg/mL     Narrative:      Among patients with dyspnea, NT-proBNP is highly sensitive for the detection of acute congestive heart failure. In addition NT-proBNP of <300 pg/ml effectively rules out acute congestive heart failure with 99% negative predictive value.      Lactic Acid, Plasma [[id number]]  (Normal) Collected: 07/03/23 0835    Specimen: Blood Updated: 07/03/23 0900     Lactate 0.9 mmol/L     CBC & Differential [[id number]]  (Abnormal) Collected: 07/03/23 0835    Specimen: Blood Updated: 07/03/23 0849    Narrative:      The following orders were created for panel order CBC & Differential.  Procedure                               Abnormality         Status                      ---------                               -----------         ------                     CBC Auto Differential[[id number]]        Abnormal            Final result               Scan Slide[[id number]]                                                                    Please view results for these tests on the individual orders.    CBC Auto Differential [[id number]]  (Abnormal) Collected: 07/03/23 0835    Specimen: Blood Updated: 07/03/23 0849     WBC 8.08 10*3/mm3      RBC 4.75 10*6/mm3      Hemoglobin 15.1 g/dL      Hematocrit 46.5 %      MCV 97.9 fL      MCH 31.8 pg      MCHC 32.5 g/dL      RDW 15.5 %      RDW-SD 56.8 fl      MPV 12.2 fL      Platelets 156 10*3/mm3      Neutrophil % 77.3 %      Lymphocyte % 14.4 %      Monocyte % 6.8 %      Eosinophil % 0.9 %      Basophil % 0.4 %      Immature Grans % 0.2 %      Neutrophils, Absolute 6.25 10*3/mm3      Lymphocytes, Absolute 1.16 10*3/mm3      Monocytes, Absolute 0.55 10*3/mm3      Eosinophils, Absolute 0.07 10*3/mm3      Basophils, Absolute 0.03 10*3/mm3      Immature Grans, Absolute 0.02 10*3/mm3      nRBC 0.0 /100 WBC           Imaging Results (Last 24 Hours)       Procedure Component Value Units Date/Time    CT Abdomen Pelvis With Contrast [[id number]] Collected: 07/03/23 0925     Updated: 07/03/23 0933    Narrative:      History:  Abdominal pain.    comparison:  none.    TECHNIQUE:  Intravenous contrast was administered and axial images from the level of the  diaphragms through the pelvis were performed followed by 2D multiplanar  reformats.    FINDINGS:  The lung bases are clear. There is cholelithiasis. The liver, spleen, kidneys  and adrenals are normal. There is peripancreatic inflammatory change consistent  with acute pancreatitis. There are no dilated loops of bowel.      Impression:      1. Peripancreatic inflammatory change involving the pancreatic tail suggesting  acute pancreatitis.    2. Cholelithiasis.          I have personally reviewed and  interpreted the radiology studies and ECG obtained at time of admission.     Assessment / Plan     Assessment:   Active Hospital Problems     Diagnosis      **Acute pancreatitis,  ED spoke with GI and eventually MRCP was ordered, will see what that shows. In the interim he would benefit from bowel rest, IV fluids, pain and nausea control while here looking for potential causes of why this happened (other than alcohol usage)    BPH without obstruction/lower urinary tract symptoms Continue flomax    Gallstone Unless there is a gallstone seen in the ducts causing obstruction, will hold off on involving surgery for now    Steatosis of liver No acute changes at this time    Hypercholesterolemia Statin while here     Plan: stay today     Code Status/Advanced Care Plan: full code    The patient's surrogate decision maker is wife.     I discussed my findings and recommendations with the patient and wife.    Estimated length of stay is 2-3 days.     Electronically signed by Clifford Cannon MD, 07/03/23, 10:21 CDT.

## 2023-07-03 NOTE — ED NOTES
"Nursing report ED to floor  Scott Scanlon  67 y.o.  male    HPI:   Chief Complaint   Patient presents with    Abdominal Pain       Admitting doctor:   Clifford Cannon MD    Consulting provider(s):  Consults       Date and Time Order Name Status Description    7/3/2023 10:37 AM Gastroenterology (on-call MD unless specified)               Admitting diagnosis:   The primary encounter diagnosis was Acute pancreatitis, unspecified complication status, unspecified pancreatitis type. A diagnosis of Calculus of gallbladder with biliary obstruction but without cholecystitis was also pertinent to this visit.    Code status:   Current Code Status       Date Active Code Status Order ID Comments User Context       7/3/2023 1042 CPR (Attempt to Resuscitate) [id number]  Clifford Cannon MD ED        Question Answer    Code Status (Patient has no pulse and is not breathing) CPR (Attempt to Resuscitate)    Medical Interventions (Patient has pulse or is breathing) Full Support                    Allergies:   Celecoxib, Corticosteroids, and Penicillins    Intake and Output  No intake or output data in the 24 hours ending 07/03/23 1049    Weight:       07/03/23  0822   Weight: 112 kg (246 lb)       Most recent vitals:   Vitals:    07/03/23 0805 07/03/23 0822 07/03/23 0900   BP: 166/89  174/83   BP Location: Right arm     Patient Position: Sitting     Pulse: 64  58   Resp: 20     Temp: 97.7 °F (36.5 °C)     TempSrc: Oral     SpO2: 97%  97%   Weight:  112 kg (246 lb)    Height:  182.9 cm (72\")      Oxygen Therapy: RA    Active LDAs/IV Access:   Lines, Drains & Airways       Active LDAs       Name Placement date Placement time Site Days    Peripheral IV 07/03/23 0846 Left Antecubital 07/03/23  0846  Antecubital  less than 1                    Labs (abnormal labs have a star):   Labs Reviewed   COMPREHENSIVE METABOLIC PANEL - Abnormal; Notable for the following components:       Result Value    Glucose 141 (*)     Chloride 108 (*)     ALT " (SGPT) 313 (*)     AST (SGOT) 196 (*)     Alkaline Phosphatase 294 (*)     Total Bilirubin 2.7 (*)     All other components within normal limits    Narrative:     GFR Normal >60  Chronic Kidney Disease <60  Kidney Failure <15     LIPASE - Abnormal; Notable for the following components:    Lipase >3,000 (*)     All other components within normal limits   URINALYSIS W/ CULTURE IF INDICATED - Abnormal; Notable for the following components:    Blood, UA Small (1+) (*)     All other components within normal limits    Narrative:     In absence of clinical symptoms, the presence of pyuria, bacteria, and/or nitrites on the urinalysis result does not correlate with infection.   CBC WITH AUTO DIFFERENTIAL - Abnormal; Notable for the following components:    MCV 97.9 (*)     RDW 15.5 (*)     RDW-SD 56.8 (*)     MPV 12.2 (*)     Neutrophil % 77.3 (*)     Lymphocyte % 14.4 (*)     All other components within normal limits   URINALYSIS, MICROSCOPIC ONLY - Abnormal; Notable for the following components:    RBC, UA 0-2 (*)     All other components within normal limits   TROPONIN - Normal    Narrative:     High Sensitive Troponin T Reference Range:  <10.0 ng/L- Negative Female for AMI  <15.0 ng/L- Negative Male for AMI  >=10 - Abnormal Female indicating possible myocardial injury.  >=15 - Abnormal Male indicating possible myocardial injury.   Clinicians would have to utilize clinical acumen, EKG, Troponin, and serial changes to determine if it is an Acute Myocardial Infarction or myocardial injury due to an underlying chronic condition.        BNP (IN-HOUSE) - Normal    Narrative:     Among patients with dyspnea, NT-proBNP is highly sensitive for the detection of acute congestive heart failure. In addition NT-proBNP of <300 pg/ml effectively rules out acute congestive heart failure with 99% negative predictive value.     LACTIC ACID, PLASMA - Normal   HIGH SENSITIVITIY TROPONIN T 2HR   POCT GLUCOSE FINGERSTICK   POCT GLUCOSE  FINGERSTICK   POCT GLUCOSE FINGERSTICK   CBC AND DIFFERENTIAL    Narrative:     The following orders were created for panel order CBC & Differential.  Procedure                               Abnormality         Status                     ---------                               -----------         ------                     CBC Auto Differential[[id number]]        Abnormal            Final result               Scan Slide[[id number]]                                                                    Please view results for these tests on the individual orders.   EXTRA TUBES    Narrative:     The following orders were created for panel order Extra Tubes.  Procedure                               Abnormality         Status                     ---------                               -----------         ------                     Green Top (No Gel)[[id number]]                                                          Gold Top - SST[[id number]]                                                                Please view results for these tests on the individual orders.   GREEN TOP NO GEL   GOLD TOP - SST       Meds given in ED:   Medications   sodium chloride 0.9 % infusion (125 mL/hr Intravenous New Bag 7/3/23 1036)   morphine injection 4 mg (has no administration in time range)   ondansetron (ZOFRAN) injection 4 mg (has no administration in time range)   lactated ringers bolus 1,000 mL (has no administration in time range)   dextrose (GLUTOSE) oral gel 15 g (has no administration in time range)   dextrose (D50W) (25 g/50 mL) IV injection 25 g (has no administration in time range)   glucagon HCl (Diagnostic) injection 1 mg (has no administration in time range)   Insulin Aspart (novoLOG) injection 0-9 Units (has no administration in time range)   lactated ringers bolus 1,000 mL (0 mL Intravenous Stopped 7/3/23 1034)   morphine injection 4 mg (4 mg Intravenous Given 7/3/23 0847)   ondansetron (ZOFRAN) injection 4 mg (4 mg  Intravenous Given 7/3/23 0846)   iopamidol (ISOVUE-300) 61 % injection 100 mL (90 mL Intravenous Given 7/3/23 0917)   HYDROmorphone (DILAUDID) injection 0.5 mg (0.5 mg Intravenous Given 7/3/23 1035)     sodium chloride, 125 mL/hr, Last Rate: 125 mL/hr (07/03/23 1036)         NIH Stroke Scale:       Isolation/Infection(s):  No active isolations   No active infections     COVID Testing  Collected NA  Resulted NA    Nursing report ED to floor:  Mental status: A&O X4  Ambulatory status: Selfer  Precautions: None    ED nurse phone extentsiku- [phone]

## 2023-07-03 NOTE — Clinical Note
Level of Care: Telemetry [5]   Diagnosis: Acute pancreatitis, unspecified complication status, unspecified pancreatitis type [[id number]]   Admitting Physician: LEANNA HERNANDEZ [[id number]]   Attending Physician: LEANNA HERNANDEZ [[id number]]

## 2023-07-03 NOTE — ED NOTES
"Nursing report ED to floor  Scott Scanlon  67 y.o.  male    HPI:   Chief Complaint   Patient presents with    Abdominal Pain       Admitting doctor:   Clifford Cannon MD    Consulting provider(s):  Consults       Date and Time Order Name Status Description    7/3/2023 10:37 AM Gastroenterology (on-call MD unless specified)               Admitting diagnosis:   The primary encounter diagnosis was Acute pancreatitis, unspecified complication status, unspecified pancreatitis type. A diagnosis of Calculus of gallbladder with biliary obstruction but without cholecystitis was also pertinent to this visit.    Code status:   Current Code Status       Date Active Code Status Order ID Comments User Context       7/3/2023 1042 CPR (Attempt to Resuscitate) [id number]  Clifford Cannon MD ED        Question Answer    Code Status (Patient has no pulse and is not breathing) CPR (Attempt to Resuscitate)    Medical Interventions (Patient has pulse or is breathing) Full Support                    Allergies:   Celecoxib, Corticosteroids, and Penicillins    Intake and Output  No intake or output data in the 24 hours ending 07/03/23 1049    Weight:       07/03/23  0822   Weight: 112 kg (246 lb)       Most recent vitals:   Vitals:    07/03/23 0805 07/03/23 0822 07/03/23 0900   BP: 166/89  174/83   BP Location: Right arm     Patient Position: Sitting     Pulse: 64  58   Resp: 20     Temp: 97.7 °F (36.5 °C)     TempSrc: Oral     SpO2: 97%  97%   Weight:  112 kg (246 lb)    Height:  182.9 cm (72\")      Oxygen Therapy: no    Active LDAs/IV Access:   Lines, Drains & Airways       Active LDAs       Name Placement date Placement time Site Days    Peripheral IV 07/03/23 0846 Left Antecubital 07/03/23  0846  Antecubital  less than 1                    Labs (abnormal labs have a star):   Labs Reviewed   COMPREHENSIVE METABOLIC PANEL - Abnormal; Notable for the following components:       Result Value    Glucose 141 (*)     Chloride 108 (*)     ALT " (SGPT) 313 (*)     AST (SGOT) 196 (*)     Alkaline Phosphatase 294 (*)     Total Bilirubin 2.7 (*)     All other components within normal limits    Narrative:     GFR Normal >60  Chronic Kidney Disease <60  Kidney Failure <15     LIPASE - Abnormal; Notable for the following components:    Lipase >3,000 (*)     All other components within normal limits   URINALYSIS W/ CULTURE IF INDICATED - Abnormal; Notable for the following components:    Blood, UA Small (1+) (*)     All other components within normal limits    Narrative:     In absence of clinical symptoms, the presence of pyuria, bacteria, and/or nitrites on the urinalysis result does not correlate with infection.   CBC WITH AUTO DIFFERENTIAL - Abnormal; Notable for the following components:    MCV 97.9 (*)     RDW 15.5 (*)     RDW-SD 56.8 (*)     MPV 12.2 (*)     Neutrophil % 77.3 (*)     Lymphocyte % 14.4 (*)     All other components within normal limits   URINALYSIS, MICROSCOPIC ONLY - Abnormal; Notable for the following components:    RBC, UA 0-2 (*)     All other components within normal limits   TROPONIN - Normal    Narrative:     High Sensitive Troponin T Reference Range:  <10.0 ng/L- Negative Female for AMI  <15.0 ng/L- Negative Male for AMI  >=10 - Abnormal Female indicating possible myocardial injury.  >=15 - Abnormal Male indicating possible myocardial injury.   Clinicians would have to utilize clinical acumen, EKG, Troponin, and serial changes to determine if it is an Acute Myocardial Infarction or myocardial injury due to an underlying chronic condition.        BNP (IN-HOUSE) - Normal    Narrative:     Among patients with dyspnea, NT-proBNP is highly sensitive for the detection of acute congestive heart failure. In addition NT-proBNP of <300 pg/ml effectively rules out acute congestive heart failure with 99% negative predictive value.     LACTIC ACID, PLASMA - Normal   HIGH SENSITIVITIY TROPONIN T 2HR   POCT GLUCOSE FINGERSTICK   POCT GLUCOSE  FINGERSTICK   POCT GLUCOSE FINGERSTICK   CBC AND DIFFERENTIAL    Narrative:     The following orders were created for panel order CBC & Differential.  Procedure                               Abnormality         Status                     ---------                               -----------         ------                     CBC Auto Differential[[id number]]        Abnormal            Final result               Scan Slide[[id number]]                                                                    Please view results for these tests on the individual orders.   EXTRA TUBES    Narrative:     The following orders were created for panel order Extra Tubes.  Procedure                               Abnormality         Status                     ---------                               -----------         ------                     Green Top (No Gel)[[id number]]                                                          Gold Top - SST[[id number]]                                                                Please view results for these tests on the individual orders.   GREEN TOP NO GEL   GOLD TOP - SST       Meds given in ED:   Medications   sodium chloride 0.9 % infusion (125 mL/hr Intravenous New Bag 7/3/23 1036)   morphine injection 4 mg (has no administration in time range)   ondansetron (ZOFRAN) injection 4 mg (has no administration in time range)   lactated ringers bolus 1,000 mL (has no administration in time range)   dextrose (GLUTOSE) oral gel 15 g (has no administration in time range)   dextrose (D50W) (25 g/50 mL) IV injection 25 g (has no administration in time range)   glucagon HCl (Diagnostic) injection 1 mg (has no administration in time range)   Insulin Aspart (novoLOG) injection 0-9 Units (has no administration in time range)   lactated ringers bolus 1,000 mL (0 mL Intravenous Stopped 7/3/23 1034)   morphine injection 4 mg (4 mg Intravenous Given 7/3/23 0847)   ondansetron (ZOFRAN) injection 4 mg (4 mg  Intravenous Given 7/3/23 0846)   iopamidol (ISOVUE-300) 61 % injection 100 mL (90 mL Intravenous Given 7/3/23 0917)   HYDROmorphone (DILAUDID) injection 0.5 mg (0.5 mg Intravenous Given 7/3/23 1035)     sodium chloride, 125 mL/hr, Last Rate: 125 mL/hr (07/03/23 1036)         NIH Stroke Scale:       Isolation/Infection(s):  No active isolations   No active infections     COVID Testing  Collected no  Resulted na    Nursing report ED to floor:  Mental status: a&ox4  Ambulatory status: ambulatory  Precautions: ppe    ED nurse phone extentsivp- [phone]

## 2023-07-03 NOTE — Clinical Note
[facility] EMERGENCY DEPARTMENT  [address] [place] [id number]  Phone: [phone]    Lin Jason accompanied Scott Scanlon to the emergency department on 7/3/2023. They may return to work on 07/03/2023.        Thank you for choosing [facility].    Martinez Khan MD

## 2023-07-03 NOTE — ED NOTES
Pr states his abdominal pain started this am around 2:30. Pt states he went to the bathroom  and had a BM which is unusual for him at that time. Pt states following the BM pt started having abdominal pain and burning in his esophagus.

## 2023-07-04 PROBLEM — N40.0 BPH WITHOUT OBSTRUCTION/LOWER URINARY TRACT SYMPTOMS: Status: ACTIVE | Noted: 2023-07-04

## 2023-07-04 LAB
ALBUMIN SERPL-MCNC: 3.3 G/DL (ref 3.5–5.2)
ALBUMIN/GLOB SERPL: 1.2 G/DL
ALP SERPL-CCNC: 222 U/L (ref 39–117)
ALT SERPL W P-5'-P-CCNC: 250 U/L (ref 1–41)
AMYLASE SERPL-CCNC: 492 U/L (ref 28–100)
ANION GAP SERPL CALCULATED.3IONS-SCNC: 9 MMOL/L (ref 5–15)
AST SERPL-CCNC: 138 U/L (ref 1–40)
BASOPHILS # BLD AUTO: 0.03 10*3/MM3 (ref 0–0.2)
BASOPHILS NFR BLD AUTO: 0.4 % (ref 0–1.5)
BILIRUB SERPL-MCNC: 1.6 MG/DL (ref 0–1.2)
BUN SERPL-MCNC: 11 MG/DL (ref 8–23)
BUN/CREAT SERPL: 13.1 (ref 7–25)
CALCIUM SPEC-SCNC: 8.8 MG/DL (ref 8.6–10.5)
CHLORIDE SERPL-SCNC: 106 MMOL/L (ref 98–107)
CO2 SERPL-SCNC: 23 MMOL/L (ref 22–29)
CREAT SERPL-MCNC: 0.84 MG/DL (ref 0.76–1.27)
DEPRECATED RDW RBC AUTO: 57.8 FL (ref 37–54)
EGFRCR SERPLBLD CKD-EPI 2021: 96.2 ML/MIN/1.73
EOSINOPHIL # BLD AUTO: 0.08 10*3/MM3 (ref 0–0.4)
EOSINOPHIL NFR BLD AUTO: 1.1 % (ref 0.3–6.2)
ERYTHROCYTE [DISTWIDTH] IN BLOOD BY AUTOMATED COUNT: 15.7 % (ref 12.3–15.4)
GLOBULIN UR ELPH-MCNC: 2.8 GM/DL
GLUCOSE BLDC GLUCOMTR-MCNC: 114 MG/DL (ref 70–130)
GLUCOSE BLDC GLUCOMTR-MCNC: 134 MG/DL (ref 70–130)
GLUCOSE BLDC GLUCOMTR-MCNC: 176 MG/DL (ref 70–130)
GLUCOSE SERPL-MCNC: 143 MG/DL (ref 65–99)
HCT VFR BLD AUTO: 42.5 % (ref 37.5–51)
HGB BLD-MCNC: 13.6 G/DL (ref 13–17.7)
IMM GRANULOCYTES # BLD AUTO: 0.03 10*3/MM3 (ref 0–0.05)
IMM GRANULOCYTES NFR BLD AUTO: 0.4 % (ref 0–0.5)
LIPASE SERPL-CCNC: 657 U/L (ref 13–60)
LYMPHOCYTES # BLD AUTO: 1.52 10*3/MM3 (ref 0.7–3.1)
LYMPHOCYTES NFR BLD AUTO: 20 % (ref 19.6–45.3)
MAGNESIUM SERPL-MCNC: 2.2 MG/DL (ref 1.6–2.4)
MCH RBC QN AUTO: 31.6 PG (ref 26.6–33)
MCHC RBC AUTO-ENTMCNC: 32 G/DL (ref 31.5–35.7)
MCV RBC AUTO: 98.8 FL (ref 79–97)
MONOCYTES # BLD AUTO: 0.58 10*3/MM3 (ref 0.1–0.9)
MONOCYTES NFR BLD AUTO: 7.6 % (ref 5–12)
NEUTROPHILS NFR BLD AUTO: 5.37 10*3/MM3 (ref 1.7–7)
NEUTROPHILS NFR BLD AUTO: 70.5 % (ref 42.7–76)
NRBC BLD AUTO-RTO: 0 /100 WBC (ref 0–0.2)
PHOSPHATE SERPL-MCNC: 3.1 MG/DL (ref 2.5–4.5)
PLATELET # BLD AUTO: 132 10*3/MM3 (ref 140–450)
PMV BLD AUTO: 12.3 FL (ref 6–12)
POTASSIUM SERPL-SCNC: 3.8 MMOL/L (ref 3.5–5.2)
PROT SERPL-MCNC: 6.1 G/DL (ref 6–8.5)
RBC # BLD AUTO: 4.3 10*6/MM3 (ref 4.14–5.8)
SODIUM SERPL-SCNC: 138 MMOL/L (ref 136–145)
WBC NRBC COR # BLD: 7.61 10*3/MM3 (ref 3.4–10.8)

## 2023-07-04 PROCEDURE — 25010000002 ENOXAPARIN PER 10 MG: Performed by: HOSPITALIST

## 2023-07-04 PROCEDURE — 80053 COMPREHEN METABOLIC PANEL: CPT | Performed by: HOSPITALIST

## 2023-07-04 PROCEDURE — 83690 ASSAY OF LIPASE: CPT | Performed by: HOSPITALIST

## 2023-07-04 PROCEDURE — 85025 COMPLETE CBC W/AUTO DIFF WBC: CPT | Performed by: HOSPITALIST

## 2023-07-04 PROCEDURE — 84100 ASSAY OF PHOSPHORUS: CPT | Performed by: HOSPITALIST

## 2023-07-04 PROCEDURE — 94799 UNLISTED PULMONARY SVC/PX: CPT

## 2023-07-04 PROCEDURE — 83735 ASSAY OF MAGNESIUM: CPT | Performed by: HOSPITALIST

## 2023-07-04 PROCEDURE — 25010000002 MORPHINE PER 10 MG: Performed by: HOSPITALIST

## 2023-07-04 PROCEDURE — 82150 ASSAY OF AMYLASE: CPT | Performed by: HOSPITALIST

## 2023-07-04 PROCEDURE — 82948 REAGENT STRIP/BLOOD GLUCOSE: CPT

## 2023-07-04 RX ORDER — LATANOPROST 50 UG/ML
1 SOLUTION/ DROPS OPHTHALMIC NIGHTLY
COMMUNITY

## 2023-07-04 RX ORDER — LATANOPROST 50 UG/ML
1 SOLUTION/ DROPS OPHTHALMIC NIGHTLY
Status: DISCONTINUED | OUTPATIENT
Start: 2023-07-04 | End: 2023-07-08 | Stop reason: HOSPADM

## 2023-07-04 RX ADMIN — Medication 10 ML: at 08:53

## 2023-07-04 RX ADMIN — MORPHINE SULFATE 4 MG: 4 INJECTION, SOLUTION INTRAMUSCULAR; INTRAVENOUS at 08:59

## 2023-07-04 RX ADMIN — SUCRALFATE 1 G: 1 TABLET ORAL at 10:38

## 2023-07-04 RX ADMIN — POTASSIUM CHLORIDE, DEXTROSE MONOHYDRATE AND SODIUM CHLORIDE 125 ML/HR: 150; 5; 450 INJECTION, SOLUTION INTRAVENOUS at 10:38

## 2023-07-04 RX ADMIN — DOCUSATE SODIUM 50 MG AND SENNOSIDES 8.6 MG 2 TABLET: 8.6; 5 TABLET, FILM COATED ORAL at 21:06

## 2023-07-04 RX ADMIN — ENOXAPARIN SODIUM 40 MG: 40 INJECTION SUBCUTANEOUS at 08:53

## 2023-07-04 RX ADMIN — SUCRALFATE 1 G: 1 TABLET ORAL at 08:52

## 2023-07-04 RX ADMIN — LATANOPROST 1 DROP: 50 SOLUTION OPHTHALMIC at 21:06

## 2023-07-04 RX ADMIN — ASPIRIN 81 MG: 81 TABLET, CHEWABLE ORAL at 08:52

## 2023-07-04 RX ADMIN — ATORVASTATIN CALCIUM 10 MG: 10 TABLET, FILM COATED ORAL at 08:52

## 2023-07-04 RX ADMIN — SUCRALFATE 1 G: 1 TABLET ORAL at 21:06

## 2023-07-04 RX ADMIN — MORPHINE SULFATE 4 MG: 4 INJECTION, SOLUTION INTRAMUSCULAR; INTRAVENOUS at 02:50

## 2023-07-04 RX ADMIN — POTASSIUM CHLORIDE, DEXTROSE MONOHYDRATE AND SODIUM CHLORIDE 125 ML/HR: 150; 5; 450 INJECTION, SOLUTION INTRAVENOUS at 18:18

## 2023-07-04 RX ADMIN — THERA TABS 1 TABLET: TAB at 08:52

## 2023-07-04 RX ADMIN — POTASSIUM CHLORIDE, DEXTROSE MONOHYDRATE AND SODIUM CHLORIDE 125 ML/HR: 150; 5; 450 INJECTION, SOLUTION INTRAVENOUS at 02:48

## 2023-07-04 RX ADMIN — DOCUSATE SODIUM 50 MG AND SENNOSIDES 8.6 MG 2 TABLET: 8.6; 5 TABLET, FILM COATED ORAL at 08:52

## 2023-07-04 RX ADMIN — ALBUTEROL SULFATE 2.5 MG: 2.5 SOLUTION RESPIRATORY (INHALATION) at 11:09

## 2023-07-04 RX ADMIN — Medication 10 ML: at 21:06

## 2023-07-04 RX ADMIN — TAMSULOSIN HYDROCHLORIDE 0.4 MG: 0.4 CAPSULE ORAL at 08:52

## 2023-07-04 RX ADMIN — SUCRALFATE 1 G: 1 TABLET ORAL at 17:20

## 2023-07-04 NOTE — PLAN OF CARE
Goal Outcome Evaluation:  Plan of Care Reviewed With: patient        Progress: no change  Outcome Evaluation: Pt c/o pain, PRN medication administered and effective; v/s stable; tolerating clear liquid diet at this time

## 2023-07-04 NOTE — PROGRESS NOTES
[facility] Medicine   INPATIENT PROGRESS NOTE      Patient Name: Scott Scanlon  Date of Admission: 7/3/2023  Today's Date: 07/04/23  Length of Stay: 1  Primary Care Physician: Gita Barnard APRN    Subjective   Chief Complaint:   HPI   Overnight course reviewed, breathing stable though he is wearing the capnograph, the abdominal tenderness and bloating is much better, no nausea or vomiting since being here. Has not had a BM but he has mainly been NPO.  Review of Systems   All pertinent negatives and positives are as above. All other systems have been reviewed and are negative unless otherwise stated.     Objective    Temp:  [98.7 °F (37.1 °C)-99.6 °F (37.6 °C)] 99.6 °F (37.6 °C)  Heart Rate:  [58-72] 72  Resp:  [16-18] 18  BP: (133-174)/(76-90) 148/85  Physical Exam  Reclined in bed, NAD  PERRL, EOMI  Mucosae moist  Breathing stable  Heart rate controlled. Hypertensive  Upper abdomen tenderness better. Softer  Moving all limbs  Skin as before  Results Review:  I have reviewed the labs, radiology results, and diagnostic studies.    Laboratory Data:   Results from last 7 days   Lab Units 07/04/23  0520 07/03/23  0835   WBC 10*3/mm3 7.61 8.08   HEMOGLOBIN g/dL 13.6 15.1   HEMATOCRIT % 42.5 46.5   PLATELETS 10*3/mm3 132* 156        Results from last 7 days   Lab Units 07/04/23  0520 07/03/23  0835   SODIUM mmol/L 138 139   POTASSIUM mmol/L 3.8 3.9   CHLORIDE mmol/L 106 108*   CO2 mmol/L 23.0 22.0   BUN mg/dL 11 13   CREATININE mg/dL 0.84 0.81   CALCIUM mg/dL 8.8 9.3   BILIRUBIN mg/dL 1.6* 2.7*   ALK PHOS U/L 222* 294*   ALT (SGPT) U/L 250* 313*   AST (SGOT) U/L 138* 196*   GLUCOSE mg/dL 143* 141*       Culture Data:   No results found for: BLOODCX  No results found for: URINECX  No results found for: RESPCX  No results found for: WOUNDCX  No results found for: STOOLCX  No components found for: BODYFLD    Radiology Data:   Imaging Results (Last 24 Hours)        Procedure Component Value Units Date/Time    MRI abdomen wo contrast mrcp [[id number]] Collected: 07/03/23 1523     Updated: 07/03/23 1559    Narrative:        HISTORY:  Abdominal pain and vomiting.    COMPARISON:  CT abdomen and pelvis 7/3/2023    TECHNIQUE:  Multiplanar, multisequence imaging through the abdomen without administration of  intravenous gadolinium.  3-D imaging of the biliary tree.    FINDINGS:  Gallstones are present.  No choledocholithiasis or biliary dilatation.  Pancreatic duct also appears normal in caliber.  There is edema about the  pancreas.  This extends toward the left and right paracolic gutters.    Background fatty infiltration of the liver but no focal lesion.  Spleen is  normal in size.  The visualized kidneys appear normal.    IMPRESSIONS:  1.  Moderate acute pancreatitis.    2.  Gallstones.  There is gallbladder distention but no evidence of  choledocholithiasis or biliary dilatation.    CT Abdomen Pelvis With Contrast [[id number]] Collected: 07/03/23 0925     Updated: 07/03/23 1025    Narrative:      History:  Abdominal pain.    comparison:  none.    TECHNIQUE:  Intravenous contrast was administered and axial images from the level of the  diaphragms through the pelvis were performed followed by 2D multiplanar  reformats.    FINDINGS:  The lung bases are clear. There is cholelithiasis. The liver, spleen, kidneys  and adrenals are normal. There is peripancreatic inflammatory change consistent  with acute pancreatitis. There are no dilated loops of bowel.      Impression:      1. Peripancreatic inflammatory change involving the pancreatic tail suggesting  acute pancreatitis.    2. Cholelithiasis.        I have reviewed the patient's current medications.     Assessment/Plan     Active Hospital Problems    Diagnosis     **Acute pancreatitis, unspecified complication status, unspecified pancreatitis type Clinically improving and the MRCP didn't show any acute gallstone pancreatitis hence he  can be treated medically for now with fluids, analgesic or antiemetic    BPH without obstruction/lower urinary tract symptoms Continue flomax    Gallstone No acute cholecystitis at this time hence no need to involve surgery at this time    Steatosis of liver No acute changes at this time    Hypercholesterolemia Statin while here   Stay today, advance to clear liquids and will see how he can tolerate it.  Electronically signed by Clifford Cannon MD, 07/04/23, 08:46 CDT.

## 2023-07-04 NOTE — PLAN OF CARE
Goal Outcome Evaluation:  Plan of Care Reviewed With: patient           Outcome Evaluation: Pt resting well between care. Pain controlled with PRN pain meds per orders. No new complaints.

## 2023-07-05 LAB
GLUCOSE BLDC GLUCOMTR-MCNC: 109 MG/DL (ref 70–130)
GLUCOSE BLDC GLUCOMTR-MCNC: 127 MG/DL (ref 70–130)
GLUCOSE BLDC GLUCOMTR-MCNC: 137 MG/DL (ref 70–130)

## 2023-07-05 PROCEDURE — 99223 1ST HOSP IP/OBS HIGH 75: CPT | Performed by: STUDENT IN AN ORGANIZED HEALTH CARE EDUCATION/TRAINING PROGRAM

## 2023-07-05 PROCEDURE — 82948 REAGENT STRIP/BLOOD GLUCOSE: CPT

## 2023-07-05 PROCEDURE — 99232 SBSQ HOSP IP/OBS MODERATE 35: CPT | Performed by: INTERNAL MEDICINE

## 2023-07-05 PROCEDURE — 25010000002 MORPHINE PER 10 MG: Performed by: INTERNAL MEDICINE

## 2023-07-05 PROCEDURE — 25010000002 ENOXAPARIN PER 10 MG: Performed by: HOSPITALIST

## 2023-07-05 RX ORDER — MORPHINE SULFATE 2 MG/ML
2 INJECTION, SOLUTION INTRAMUSCULAR; INTRAVENOUS EVERY 4 HOURS PRN
Status: DISCONTINUED | OUTPATIENT
Start: 2023-07-05 | End: 2023-07-08 | Stop reason: HOSPADM

## 2023-07-05 RX ADMIN — TAMSULOSIN HYDROCHLORIDE 0.4 MG: 0.4 CAPSULE ORAL at 08:23

## 2023-07-05 RX ADMIN — SUCRALFATE 1 G: 1 TABLET ORAL at 11:05

## 2023-07-05 RX ADMIN — SUCRALFATE 1 G: 1 TABLET ORAL at 08:23

## 2023-07-05 RX ADMIN — POTASSIUM CHLORIDE, DEXTROSE MONOHYDRATE AND SODIUM CHLORIDE 125 ML/HR: 150; 5; 450 INJECTION, SOLUTION INTRAVENOUS at 02:01

## 2023-07-05 RX ADMIN — THERA TABS 1 TABLET: TAB at 08:23

## 2023-07-05 RX ADMIN — POTASSIUM CHLORIDE, DEXTROSE MONOHYDRATE AND SODIUM CHLORIDE 125 ML/HR: 150; 5; 450 INJECTION, SOLUTION INTRAVENOUS at 17:20

## 2023-07-05 RX ADMIN — ASPIRIN 81 MG: 81 TABLET, CHEWABLE ORAL at 08:23

## 2023-07-05 RX ADMIN — SUCRALFATE 1 G: 1 TABLET ORAL at 20:06

## 2023-07-05 RX ADMIN — MORPHINE SULFATE 2 MG: 2 INJECTION, SOLUTION INTRAMUSCULAR; INTRAVENOUS at 08:24

## 2023-07-05 RX ADMIN — ENOXAPARIN SODIUM 40 MG: 40 INJECTION SUBCUTANEOUS at 08:23

## 2023-07-05 RX ADMIN — DOCUSATE SODIUM 50 MG AND SENNOSIDES 8.6 MG 2 TABLET: 8.6; 5 TABLET, FILM COATED ORAL at 20:06

## 2023-07-05 RX ADMIN — LATANOPROST 1 DROP: 50 SOLUTION OPHTHALMIC at 20:08

## 2023-07-05 RX ADMIN — ATORVASTATIN CALCIUM 10 MG: 10 TABLET, FILM COATED ORAL at 08:23

## 2023-07-05 RX ADMIN — SUCRALFATE 1 G: 1 TABLET ORAL at 17:20

## 2023-07-05 NOTE — CONSULTS
General Surgery Consult Note      Consulted by: Dr. Mena  Consulted for: Gallstone Pancreatitis      Chief complaint Abdominal pain      HPI: 68 yo gentleman with pancreatitis secondary to gallstone disease. His pain has significantly improved and so I was consulted for possible cholecystectomy.         Review of Systems   Constitutional: Negative for activity change and unexpected weight change.   HENT: Negative for congestion and dental problem.    Eyes: Negative for discharge and itching.   Respiratory: Negative for apnea and chest tightness.    Cardiovascular: Negative for chest pain and palpitations.   Gastrointestinal: Negative for abdominal distention and abdominal pain.   Endocrine: Negative for polydipsia and polyuria.   Musculoskeletal: Negative for arthralgias and back pain.   Skin: Negative for color change and pallor.   Neurological: Negative for dizziness and facial asymmetry.   Psychiatric/Behavioral: Negative for agitation and behavioral problems.        Past Medical History:   Diagnosis Date    BPH (benign prostatic hyperplasia)     Diabetes mellitus     Hypercholesterolemia     Hyperlipidemia      Past Surgical History:   Procedure Laterality Date    COLONOSCOPY N/A 1/10/2022    COLONOSCOPY N/A 1/4/2023    Procedure: COLONOSCOPY;  Surgeon: Jhonny King MD;  Location: [facility] ENDOSCOPY;  Service: Gastroenterology;  Laterality: N/A;    ENDOSCOPY N/A 1/4/2023    Procedure: ESOPHAGOGASTRODUODENOSCOPY;  Surgeon: Jhonny King MD;  Location: [facility] ENDOSCOPY;  Service: Gastroenterology;  Laterality: N/A;    ENDOSCOPY N/A 3/10/2023    Procedure: ESOPHAGOGASTRODUODENOSCOPY;  Surgeon: Jhonny King MD;  Location: [facility] ENDOSCOPY;  Service: Gastroenterology;  Laterality: N/A;    JOINT REPLACEMENT      KNEE ARTHROSCOPY Left     TOTAL KNEE ARTHROPLASTY Right      Family History   Problem Relation Age of Onset    Hypertension Mother     Hypertension Father      Social History     Tobacco Use     Smoking status: Some Days     Years: 30.00     Types: Cigarettes    Smokeless tobacco: Never   Vaping Use    Vaping Use: Never used   Substance Use Topics    Alcohol use: Yes     Comment: occ beer    Drug use: Never     Medications Prior to Admission   Medication Sig Dispense Refill Last Dose    albuterol sulfate  (90 Base) MCG/ACT inhaler Inhale 2 puffs.   7/3/2023    aspirin 81 MG chewable tablet Chew 1 tablet Daily.   Past Week    cholecalciferol (VITAMIN D3) 1.25 MG (36826 UT) capsule Take 1 capsule by mouth 1 (One) Time Per Week.   7/2/2023    latanoprost (XALATAN) 0.005 % ophthalmic solution Administer 1 drop to both eyes Every Night.       lovastatin (MEVACOR) 40 MG tablet Take 1 tablet by mouth every night at bedtime.   7/2/2023    meloxicam (MOBIC) 7.5 MG tablet Take 1 tablet by mouth Daily.   7/2/2023    metFORMIN (GLUCOPHAGE) 1000 MG tablet Take 1 tablet by mouth.   7/2/2023    multivitamin (THERAGRAN) tablet tablet Take 1 tablet by mouth Daily.   7/2/2023    sucralfate (CARAFATE) 1 g tablet Take 1 tablet by mouth 4 (Four) Times a Day Before Meals & at Bedtime As Needed.   7/3/2023    tamsulosin (FLOMAX) 0.4 MG capsule 24 hr capsule Take 1 capsule by mouth Daily.   7/2/2023     Allergies:  Celecoxib, Corticosteroids, and Penicillins    Objective      Vital Signs  Temp:  [97.5 °F (36.4 °C)-98.9 °F (37.2 °C)] 97.8 °F (36.6 °C)  Heart Rate:  [59-81] 59  Resp:  [18] 18  BP: (138-165)/(73-91) 165/91    Allergies   Allergen Reactions    Celecoxib Confusion    Corticosteroids Other (See Comments)     Pt states caused convulsions    Penicillins Confusion       Prior to Admission medications    Medication Sig Start Date End Date Taking? Authorizing Provider   albuterol sulfate  (90 Base) MCG/ACT inhaler Inhale 2 puffs. 7/6/22  Yes Provider, MD Glory   aspirin 81 MG chewable tablet Chew 1 tablet Daily. 1/29/22  Yes Aj Parks MD   cholecalciferol (VITAMIN D3) 1.25 MG (26394 UT) capsule  Take 1 capsule by mouth 1 (One) Time Per Week. 7/6/22  Yes Glory Ramos MD   latanoprost (XALATAN) 0.005 % ophthalmic solution Administer 1 drop to both eyes Every Night.   Yes ProviderGlory MD   lovastatin (MEVACOR) 40 MG tablet Take 1 tablet by mouth every night at bedtime. 7/6/22  Yes Glory Ramos MD   meloxicam (MOBIC) 7.5 MG tablet Take 1 tablet by mouth Daily.   Yes Glory Ramos MD   metFORMIN (GLUCOPHAGE) 1000 MG tablet Take 1 tablet by mouth. 7/6/22  Yes ProviderGlory MD   multivitamin (THERAGRAN) tablet tablet Take 1 tablet by mouth Daily.   Yes ProviderGlory MD   sucralfate (CARAFATE) 1 g tablet Take 1 tablet by mouth 4 (Four) Times a Day Before Meals & at Bedtime As Needed. 2/7/23  Yes Glory Ramos MD   tamsulosin (FLOMAX) 0.4 MG capsule 24 hr capsule Take 1 capsule by mouth Daily. 1/29/22  Yes Aj Parks MD       Physical Exam:  Constitutional: Alert, nontoxic  Head: Normocephalic, atraumatic  Nose: No congestion, no rhinorrhea  Mouth: Moist, no exudate  Eyes: Conjunctivae within normal limits, no scleral icterus  Neck: Supple, normal range of motion  Cardiovascular: Normal rate, no lower extremity edema  Pulmonary: Normal effort, symmetrical chest rise  Abdomen: Soft, mildly tender in midepigastrum    Results Review:  Lab Results (last 48 hours)       Procedure Component Value Units Date/Time    POC Glucose Once [[id number]]  (Abnormal) Collected: 07/05/23 1035    Specimen: Blood Updated: 07/05/23 1100     Glucose 137 mg/dL      Comment: RN NotifiedOperator: [id number] MICHELLELIN KATLINMeter ID: [id number]       POC Glucose Once [[id number]]  (Normal) Collected: 07/05/23 0706    Specimen: Blood Updated: 07/05/23 0729     Glucose 127 mg/dL      Comment: RN NotifiedOperator: [id number] CAMPLIN KATLINMeter ID: [id number]       POC Glucose Once [[id number]]  (Abnormal) Collected: 07/04/23 1921    Specimen: Blood Updated: 07/04/23 2002      Glucose 176 mg/dL      Comment: RN NotifiedOperator: [id number] GHASSAN CHRISTINAMeter ID: [id number]       POC Glucose Once [[id number]]  (Normal) Collected: 07/04/23 1630    Specimen: Blood Updated: 07/04/23 1807     Glucose 114 mg/dL      Comment: RN NotifiedOperator: [id number] CAMPLIN HEATHERMeter ID: [id number]       POC Glucose Once [[id number]]  (Abnormal) Collected: 07/04/23 1054    Specimen: Blood Updated: 07/04/23 1137     Glucose 134 mg/dL      Comment: RN NotifiedOperator: [id number] CAMPLIN HEATHERMeter ID: [id number]       Lipase [[id number]]  (Abnormal) Collected: 07/04/23 0520    Specimen: Blood Updated: 07/04/23 0624     Lipase 657 U/L     Magnesium [[id number]]  (Normal) Collected: 07/04/23 0520    Specimen: Blood Updated: 07/04/23 0617     Magnesium 2.2 mg/dL     Comprehensive Metabolic Panel [[id number]]  (Abnormal) Collected: 07/04/23 0520    Specimen: Blood Updated: 07/04/23 0617     Glucose 143 mg/dL      BUN 11 mg/dL      Creatinine 0.84 mg/dL      Sodium 138 mmol/L      Potassium 3.8 mmol/L      Chloride 106 mmol/L      CO2 23.0 mmol/L      Calcium 8.8 mg/dL      Total Protein 6.1 g/dL      Albumin 3.3 g/dL      ALT (SGPT) 250 U/L      AST (SGOT) 138 U/L      Alkaline Phosphatase 222 U/L      Total Bilirubin 1.6 mg/dL      Globulin 2.8 gm/dL      A/G Ratio 1.2 g/dL      BUN/Creatinine Ratio 13.1     Anion Gap 9.0 mmol/L      eGFR 96.2 mL/min/1.73     Narrative:      GFR Normal >60  Chronic Kidney Disease <60  Kidney Failure <15      Amylase [[id number]]  (Abnormal) Collected: 07/04/23 0520    Specimen: Blood Updated: 07/04/23 0617     Amylase 492 U/L     Phosphorus [[id number]]  (Normal) Collected: 07/04/23 0520    Specimen: Blood Updated: 07/04/23 0617     Phosphorus 3.1 mg/dL     CBC Auto Differential [[id number]]  (Abnormal) Collected: 07/04/23 0520    Specimen: Blood Updated: 07/04/23 0545     WBC 7.61 10*3/mm3      RBC 4.30 10*6/mm3      Hemoglobin 13.6 g/dL      Hematocrit 42.5 %       MCV 98.8 fL      MCH 31.6 pg      MCHC 32.0 g/dL      RDW 15.7 %      RDW-SD 57.8 fl      MPV 12.3 fL      Platelets 132 10*3/mm3      Neutrophil % 70.5 %      Lymphocyte % 20.0 %      Monocyte % 7.6 %      Eosinophil % 1.1 %      Basophil % 0.4 %      Immature Grans % 0.4 %      Neutrophils, Absolute 5.37 10*3/mm3      Lymphocytes, Absolute 1.52 10*3/mm3      Monocytes, Absolute 0.58 10*3/mm3      Eosinophils, Absolute 0.08 10*3/mm3      Basophils, Absolute 0.03 10*3/mm3      Immature Grans, Absolute 0.03 10*3/mm3      nRBC 0.0 /100 WBC     POC Glucose Once [[id number]]  (Normal) Collected: 07/03/23 1936    Specimen: Blood Updated: 07/03/23 1955     Glucose 108 mg/dL      Comment: : [id number] [place] TABETHAMeter ID: [id number]       POC Glucose Once [[id number]]  (Normal) Collected: 07/03/23 1629    Specimen: Blood Updated: 07/03/23 1649     Glucose 84 mg/dL      Comment: : [id number] CARVER ALISEMeter ID: [id number]       POC Glucose Once [[id number]]  (Normal) Collected: 07/03/23 1346    Specimen: Blood Updated: 07/03/23 1636     Glucose 95 mg/dL      Comment: : [id number] CARVER ALISEMeter ID: [id number]       Extra Tubes [[id number]] Collected: 07/03/23 0835    Specimen: Blood, Venous Line Updated: 07/03/23 1615    Narrative:      The following orders were created for panel order Extra Tubes.  Procedure                               Abnormality         Status                     ---------                               -----------         ------                     Light Blue Top[[id number]]                                   Final result                 Please view results for these tests on the individual orders.    Light Blue Top [[id number]] Collected: 07/03/23 0835    Specimen: Blood Updated: 07/03/23 1615     Extra Tube Hold for add-ons.     Comment: Auto resulted       Extra Tubes [[id number]] Collected: 07/03/23 0835    Specimen: Blood, Venous Line Updated: 07/03/23 1615    Narrative:       The following orders were created for panel order Extra Tubes.  Procedure                               Abnormality         Status                     ---------                               -----------         ------                     Green Top (No Gel)[[id number]]                                                          Gold Top - SST[[id number]]                                   Final result                 Please view results for these tests on the individual orders.    Gold Top - SST [[id number]] Collected: 07/03/23 0835    Specimen: Blood Updated: 07/03/23 1615     Extra Tube Hold for add-ons.     Comment: Auto resulted.             Imaging Results (Last 48 Hours)       Procedure Component Value Units Date/Time    MRI abdomen wo contrast mrcp [[id number]] Collected: 07/03/23 1523     Updated: 07/03/23 1559    Narrative:        HISTORY:  Abdominal pain and vomiting.    COMPARISON:  CT abdomen and pelvis 7/3/2023    TECHNIQUE:  Multiplanar, multisequence imaging through the abdomen without administration of  intravenous gadolinium.  3-D imaging of the biliary tree.    FINDINGS:  Gallstones are present.  No choledocholithiasis or biliary dilatation.  Pancreatic duct also appears normal in caliber.  There is edema about the  pancreas.  This extends toward the left and right paracolic gutters.    Background fatty infiltration of the liver but no focal lesion.  Spleen is  normal in size.  The visualized kidneys appear normal.    IMPRESSIONS:  1.  Moderate acute pancreatitis.    2.  Gallstones.  There is gallbladder distention but no evidence of  choledocholithiasis or biliary dilatation.             I reviewed the patient's new clinical results.  I reviewed the patient's new imaging results and agree with the interpretation.  I reviewed the patient's other test results and agree with the interpretation        Assessment & Plan       Acute pancreatitis, unspecified complication status, unspecified pancreatitis  type    Hypercholesterolemia    Gallstone    Steatosis of liver    BPH without obstruction/lower urinary tract symptoms    I offered the patient a robotic cholecystectomy and discussed the risks, benefits and alteratives to surgery. The risks of the surgery are bleeding, infection, damage to surrounding structures (common bile duct or intestine), pain and scarring. The benefits are likely resolution of the pain as well as prevention of further complications such as pancreatitis, cholangitis, acute cholecystitis and other biliary pathology. Alternatively we could not operate on the patient, however this puts her at risk of the above complications. The patient understands these risks and wishes to proceed with robotic assisted cholecystectomy.        I discussed the patients findings and my recommendations with patient    Raymon Dennison MD  07/05/23  15:00 CDT

## 2023-07-05 NOTE — PAYOR COMM NOTE
"  [facility]  Case Managment Extender   Holly Rosas  (P) [phone] (F) [phone]          REF# [id number]   Capo Karina Ty (67 y.o. Male)       Date of Birth   1955    Social Security Number       Address   [address] [place] [id number]    Home Phone   [phone]    MRN   [id number]       Rastafarian   Alevism    Marital Status   Single                            Admission Date   7/3/23    Admission Type   Emergency    Admitting Provider   Clifford Cannon MD    Attending Provider   Zoraida Mena MD    Department, Room/Bed   [address], 427/1       Discharge Date       Discharge Disposition       Discharge Destination                                 Attending Provider: Zoraida Mnea MD    Allergies: Celecoxib, Corticosteroids, Penicillins    Isolation: None   Infection: None   Code Status: CPR    Ht: 182.9 cm (72\")   Wt: 114 kg (252 lb)    Admission Cmt: None   Principal Problem: Acute pancreatitis, unspecified complication status, unspecified pancreatitis type [K85.90]                   Active Insurance as of 7/3/2023       Primary Coverage       Payor Plan Insurance Group Employer/Plan Group    ANTHEM MEDICARE REPLACEMENT ANTHEM MEDICARE ADVANTAGE [id number]       Payor Plan Address Payor Plan Phone Number Payor Plan Fax Number Effective Dates    [address] [phone]  3/1/2021 - None Entered    [facility], [place] [address]         Subscriber Name Subscriber Birth Date Member ID       KARINA MEZA KEITH 1955 [id number]               Secondary Coverage       Payor Plan Insurance Group Employer/Plan Group    [place]        Payor Plan Address Payor Plan Phone Number Payor Plan Fax Number Effective Dates    [address] [phone]  9/1/2020 - None Entered    [facility] [id number]         Subscriber Name Subscriber Birth Date Member ID       KARINA MEZA " "1955 [id number]                     Emergency Contacts        (Rel.) Home Phone Work Phone Mobile Phone    LYNNE VO (Friend) [phone] -- [phone]              History & Physical    No notes of this type exist for this encounter.          Emergency Department Notes        Patriica Frank, RN at 07/03/23 1049          Nursing report ED to floor  Scott Scanlon  67 y.o.  male    HPI:   Chief Complaint   Patient presents with    Abdominal Pain       Admitting doctor:   Clifford Cannon MD    Consulting provider(s):  Consults       Date and Time Order Name Status Description    7/3/2023 10:37 AM Gastroenterology (on-call MD unless specified)               Admitting diagnosis:   The primary encounter diagnosis was Acute pancreatitis, unspecified complication status, unspecified pancreatitis type. A diagnosis of Calculus of gallbladder with biliary obstruction but without cholecystitis was also pertinent to this visit.    Code status:   Current Code Status       Date Active Code Status Order ID Comments User Context       7/3/2023 1042 CPR (Attempt to Resuscitate) [id number]  Clifford Cannon MD ED        Question Answer    Code Status (Patient has no pulse and is not breathing) CPR (Attempt to Resuscitate)    Medical Interventions (Patient has pulse or is breathing) Full Support                    Allergies:   Celecoxib, Corticosteroids, and Penicillins    Intake and Output  No intake or output data in the 24 hours ending 07/03/23 1049    Weight:       07/03/23  0822   Weight: 112 kg (246 lb)       Most recent vitals:   Vitals:    07/03/23 0805 07/03/23 0822 07/03/23 0900   BP: 166/89  174/83   BP Location: Right arm     Patient Position: Sitting     Pulse: 64  58   Resp: 20     Temp: 97.7 °F (36.5 °C)     TempSrc: Oral     SpO2: 97%  97%   Weight:  112 kg (246 lb)    Height:  182.9 cm (72\")      Oxygen Therapy: no    Active LDAs/IV Access:   Lines, Drains & Airways       Active LDAs       " Name Placement date Placement time Site Days    Peripheral IV 07/03/23 0846 Left Antecubital 07/03/23  0846  Antecubital  less than 1                    Labs (abnormal labs have a star):   Labs Reviewed   COMPREHENSIVE METABOLIC PANEL - Abnormal; Notable for the following components:       Result Value    Glucose 141 (*)     Chloride 108 (*)     ALT (SGPT) 313 (*)     AST (SGOT) 196 (*)     Alkaline Phosphatase 294 (*)     Total Bilirubin 2.7 (*)     All other components within normal limits    Narrative:     GFR Normal >60  Chronic Kidney Disease <60  Kidney Failure <15     LIPASE - Abnormal; Notable for the following components:    Lipase >3,000 (*)     All other components within normal limits   URINALYSIS W/ CULTURE IF INDICATED - Abnormal; Notable for the following components:    Blood, UA Small (1+) (*)     All other components within normal limits    Narrative:     In absence of clinical symptoms, the presence of pyuria, bacteria, and/or nitrites on the urinalysis result does not correlate with infection.   CBC WITH AUTO DIFFERENTIAL - Abnormal; Notable for the following components:    MCV 97.9 (*)     RDW 15.5 (*)     RDW-SD 56.8 (*)     MPV 12.2 (*)     Neutrophil % 77.3 (*)     Lymphocyte % 14.4 (*)     All other components within normal limits   URINALYSIS, MICROSCOPIC ONLY - Abnormal; Notable for the following components:    RBC, UA 0-2 (*)     All other components within normal limits   TROPONIN - Normal    Narrative:     High Sensitive Troponin T Reference Range:  <10.0 ng/L- Negative Female for AMI  <15.0 ng/L- Negative Male for AMI  >=10 - Abnormal Female indicating possible myocardial injury.  >=15 - Abnormal Male indicating possible myocardial injury.   Clinicians would have to utilize clinical acumen, EKG, Troponin, and serial changes to determine if it is an Acute Myocardial Infarction or myocardial injury due to an underlying chronic condition.        BNP (IN-HOUSE) - Normal    Narrative:      Among patients with dyspnea, NT-proBNP is highly sensitive for the detection of acute congestive heart failure. In addition NT-proBNP of <300 pg/ml effectively rules out acute congestive heart failure with 99% negative predictive value.     LACTIC ACID, PLASMA - Normal   HIGH SENSITIVITIY TROPONIN T 2HR   POCT GLUCOSE FINGERSTICK   POCT GLUCOSE FINGERSTICK   POCT GLUCOSE FINGERSTICK   CBC AND DIFFERENTIAL    Narrative:     The following orders were created for panel order CBC & Differential.  Procedure                               Abnormality         Status                     ---------                               -----------         ------                     CBC Auto Differential[[id number]]        Abnormal            Final result               Scan Slide[[id number]]                                                                    Please view results for these tests on the individual orders.   EXTRA TUBES    Narrative:     The following orders were created for panel order Extra Tubes.  Procedure                               Abnormality         Status                     ---------                               -----------         ------                     Green Top (No Gel)[[id number]]                                                          Gold Top - SST[[id number]]                                                                Please view results for these tests on the individual orders.   GREEN TOP NO GEL   GOLD TOP - SST       Meds given in ED:   Medications   sodium chloride 0.9 % infusion (125 mL/hr Intravenous New Bag 7/3/23 1036)   morphine injection 4 mg (has no administration in time range)   ondansetron (ZOFRAN) injection 4 mg (has no administration in time range)   lactated ringers bolus 1,000 mL (has no administration in time range)   dextrose (GLUTOSE) oral gel 15 g (has no administration in time range)   dextrose (D50W) (25 g/50 mL) IV injection 25 g (has no administration in time range)    glucagon HCl (Diagnostic) injection 1 mg (has no administration in time range)   Insulin Aspart (novoLOG) injection 0-9 Units (has no administration in time range)   lactated ringers bolus 1,000 mL (0 mL Intravenous Stopped 7/3/23 1034)   morphine injection 4 mg (4 mg Intravenous Given 7/3/23 0847)   ondansetron (ZOFRAN) injection 4 mg (4 mg Intravenous Given 7/3/23 0846)   iopamidol (ISOVUE-300) 61 % injection 100 mL (90 mL Intravenous Given 7/3/23 0917)   HYDROmorphone (DILAUDID) injection 0.5 mg (0.5 mg Intravenous Given 7/3/23 1035)     sodium chloride, 125 mL/hr, Last Rate: 125 mL/hr (07/03/23 1036)         NIH Stroke Scale:       Isolation/Infection(s):  No active isolations   No active infections     COVID Testing  Collected no  Resulted na    Nursing report ED to floor:  Mental status: a&ox4  Ambulatory status: ambulatory  Precautions: ppe    ED nurse phone [email or web address]- [phone]    Electronically signed by Patricia Frank, RN at 07/03/23 1053       Rony Baig, RN at 07/03/23 1049          Nursing report ED to floor  Scott Scanlon  67 y.o.  male    HPI:   Chief Complaint   Patient presents with    Abdominal Pain       Admitting doctor:   Clifford Cannon MD    Consulting provider(s):  Consults       Date and Time Order Name Status Description    7/3/2023 10:37 AM Gastroenterology (on-call MD unless specified)               Admitting diagnosis:   The primary encounter diagnosis was Acute pancreatitis, unspecified complication status, unspecified pancreatitis type. A diagnosis of Calculus of gallbladder with biliary obstruction but without cholecystitis was also pertinent to this visit.    Code status:   Current Code Status       Date Active Code Status Order ID Comments User Context       7/3/2023 1042 CPR (Attempt to Resuscitate) [id number]  Clifford Cannon MD ED        Question Answer    Code Status (Patient has no pulse and is not breathing) CPR (Attempt to Resuscitate)    Medical Interventions (Patient has  "pulse or is breathing) Full Support                    Allergies:   Celecoxib, Corticosteroids, and Penicillins    Intake and Output  No intake or output data in the 24 hours ending 07/03/23 1049    Weight:       07/03/23  0822   Weight: 112 kg (246 lb)       Most recent vitals:   Vitals:    07/03/23 0805 07/03/23 0822 07/03/23 0900   BP: 166/89  174/83   BP Location: Right arm     Patient Position: Sitting     Pulse: 64  58   Resp: 20     Temp: 97.7 °F (36.5 °C)     TempSrc: Oral     SpO2: 97%  97%   Weight:  112 kg (246 lb)    Height:  182.9 cm (72\")      Oxygen Therapy: RA    Active LDAs/IV Access:   Lines, Drains & Airways       Active LDAs       Name Placement date Placement time Site Days    Peripheral IV 07/03/23 0846 Left Antecubital 07/03/23  0846  Antecubital  less than 1                    Labs (abnormal labs have a star):   Labs Reviewed   COMPREHENSIVE METABOLIC PANEL - Abnormal; Notable for the following components:       Result Value    Glucose 141 (*)     Chloride 108 (*)     ALT (SGPT) 313 (*)     AST (SGOT) 196 (*)     Alkaline Phosphatase 294 (*)     Total Bilirubin 2.7 (*)     All other components within normal limits    Narrative:     GFR Normal >60  Chronic Kidney Disease <60  Kidney Failure <15     LIPASE - Abnormal; Notable for the following components:    Lipase >3,000 (*)     All other components within normal limits   URINALYSIS W/ CULTURE IF INDICATED - Abnormal; Notable for the following components:    Blood, UA Small (1+) (*)     All other components within normal limits    Narrative:     In absence of clinical symptoms, the presence of pyuria, bacteria, and/or nitrites on the urinalysis result does not correlate with infection.   CBC WITH AUTO DIFFERENTIAL - Abnormal; Notable for the following components:    MCV 97.9 (*)     RDW 15.5 (*)     RDW-SD 56.8 (*)     MPV 12.2 (*)     Neutrophil % 77.3 (*)     Lymphocyte % 14.4 (*)     All other components within normal limits   URINALYSIS, " MICROSCOPIC ONLY - Abnormal; Notable for the following components:    RBC, UA 0-2 (*)     All other components within normal limits   TROPONIN - Normal    Narrative:     High Sensitive Troponin T Reference Range:  <10.0 ng/L- Negative Female for AMI  <15.0 ng/L- Negative Male for AMI  >=10 - Abnormal Female indicating possible myocardial injury.  >=15 - Abnormal Male indicating possible myocardial injury.   Clinicians would have to utilize clinical acumen, EKG, Troponin, and serial changes to determine if it is an Acute Myocardial Infarction or myocardial injury due to an underlying chronic condition.        BNP (IN-HOUSE) - Normal    Narrative:     Among patients with dyspnea, NT-proBNP is highly sensitive for the detection of acute congestive heart failure. In addition NT-proBNP of <300 pg/ml effectively rules out acute congestive heart failure with 99% negative predictive value.     LACTIC ACID, PLASMA - Normal   HIGH SENSITIVITIY TROPONIN T 2HR   POCT GLUCOSE FINGERSTICK   POCT GLUCOSE FINGERSTICK   POCT GLUCOSE FINGERSTICK   CBC AND DIFFERENTIAL    Narrative:     The following orders were created for panel order CBC & Differential.  Procedure                               Abnormality         Status                     ---------                               -----------         ------                     CBC Auto Differential[[id number]]        Abnormal            Final result               Scan Slide[[id number]]                                                                    Please view results for these tests on the individual orders.   EXTRA TUBES    Narrative:     The following orders were created for panel order Extra Tubes.  Procedure                               Abnormality         Status                     ---------                               -----------         ------                     Green Top (No Gel)[[id number]]                                                          Gold Top -  [facility][[id number]]                                                                Please view results for these tests on the individual orders.   GREEN TOP NO GEL   GOLD TOP - SST       Meds given in ED:   Medications   sodium chloride 0.9 % infusion (125 mL/hr Intravenous New Bag 7/3/23 1036)   morphine injection 4 mg (has no administration in time range)   ondansetron (ZOFRAN) injection 4 mg (has no administration in time range)   lactated ringers bolus 1,000 mL (has no administration in time range)   dextrose (GLUTOSE) oral gel 15 g (has no administration in time range)   dextrose (D50W) (25 g/50 mL) IV injection 25 g (has no administration in time range)   glucagon HCl (Diagnostic) injection 1 mg (has no administration in time range)   Insulin Aspart (novoLOG) injection 0-9 Units (has no administration in time range)   lactated ringers bolus 1,000 mL (0 mL Intravenous Stopped 7/3/23 1034)   morphine injection 4 mg (4 mg Intravenous Given 7/3/23 0847)   ondansetron (ZOFRAN) injection 4 mg (4 mg Intravenous Given 7/3/23 0846)   iopamidol (ISOVUE-300) 61 % injection 100 mL (90 mL Intravenous Given 7/3/23 0917)   HYDROmorphone (DILAUDID) injection 0.5 mg (0.5 mg Intravenous Given 7/3/23 1035)     sodium chloride, 125 mL/hr, Last Rate: 125 mL/hr (07/03/23 1036)         NIH Stroke Scale:       Isolation/Infection(s):  No active isolations   No active infections     COVID Testing  Collected NA  Resulted NA    Nursing report ED to floor:  Mental status: A&O X4  Ambulatory status: Selfer  Precautions: None    ED nurse phone [email or web address]- [phone]     Electronically signed by Rony Baig, RN at 07/03/23 1044       Rony Baig, RN at 07/03/23 0817          Pr states his abdominal pain started this am around 2:30. Pt states he went to the bathroom  and had a BM which is unusual for him at that time. Pt states following the BM pt started having abdominal pain and burning in his esophagus.     Electronically signed by Rony Baig  JULIO AUGUSTIN at 07/03/23 0851       Wei Anglin at 07/03/23 0816          EKG Completed in triage at 0814    Electronically signed by Wei Anglin at 07/03/23 0816       Lab Results (last 48 hours)       Procedure Component Value Units Date/Time    POC Glucose Once [[id number]]  (Normal) Collected: 07/05/23 0706    Specimen: Blood Updated: 07/05/23 0729     Glucose 127 mg/dL      Comment: RN NotifiedOperator: [id number] MANJIT KATLINMeter ID: [id number]       POC Glucose Once [[id number]]  (Abnormal) Collected: 07/04/23 1921    Specimen: Blood Updated: 07/04/23 2002     Glucose 176 mg/dL      Comment: RN NotifiedOperator: [id number] GHASSAN CHRISTINAMeter ID: [id number]       POC Glucose Once [[id number]]  (Normal) Collected: 07/04/23 1630    Specimen: Blood Updated: 07/04/23 1807     Glucose 114 mg/dL      Comment: RN NotifiedOperator: [id number] CAMPLIN HEATHERMeter ID: [id number]       POC Glucose Once [[id number]]  (Abnormal) Collected: 07/04/23 1054    Specimen: Blood Updated: 07/04/23 1137     Glucose 134 mg/dL      Comment: RN NotifiedOperator: [id number] CAMPLIN HEATHERMeter ID: [id number]       Lipase [[id number]]  (Abnormal) Collected: 07/04/23 0520    Specimen: Blood Updated: 07/04/23 0624     Lipase 657 U/L     Magnesium [[id number]]  (Normal) Collected: 07/04/23 0520    Specimen: Blood Updated: 07/04/23 0617     Magnesium 2.2 mg/dL     Comprehensive Metabolic Panel [[id number]]  (Abnormal) Collected: 07/04/23 0520    Specimen: Blood Updated: 07/04/23 0617     Glucose 143 mg/dL      BUN 11 mg/dL      Creatinine 0.84 mg/dL      Sodium 138 mmol/L      Potassium 3.8 mmol/L      Chloride 106 mmol/L      CO2 23.0 mmol/L      Calcium 8.8 mg/dL      Total Protein 6.1 g/dL      Albumin 3.3 g/dL      ALT (SGPT) 250 U/L      AST (SGOT) 138 U/L      Alkaline Phosphatase 222 U/L      Total Bilirubin 1.6 mg/dL      Globulin 2.8 gm/dL      A/G Ratio 1.2 g/dL      BUN/Creatinine Ratio 13.1     Anion Gap 9.0 mmol/L       eGFR 96.2 mL/min/1.73     Narrative:      GFR Normal >60  Chronic Kidney Disease <60  Kidney Failure <15      Amylase [[id number]]  (Abnormal) Collected: 07/04/23 0520    Specimen: Blood Updated: 07/04/23 0617     Amylase 492 U/L     Phosphorus [[id number]]  (Normal) Collected: 07/04/23 0520    Specimen: Blood Updated: 07/04/23 0617     Phosphorus 3.1 mg/dL     CBC Auto Differential [[id number]]  (Abnormal) Collected: 07/04/23 0520    Specimen: Blood Updated: 07/04/23 0545     WBC 7.61 10*3/mm3      RBC 4.30 10*6/mm3      Hemoglobin 13.6 g/dL      Hematocrit 42.5 %      MCV 98.8 fL      MCH 31.6 pg      MCHC 32.0 g/dL      RDW 15.7 %      RDW-SD 57.8 fl      MPV 12.3 fL      Platelets 132 10*3/mm3      Neutrophil % 70.5 %      Lymphocyte % 20.0 %      Monocyte % 7.6 %      Eosinophil % 1.1 %      Basophil % 0.4 %      Immature Grans % 0.4 %      Neutrophils, Absolute 5.37 10*3/mm3      Lymphocytes, Absolute 1.52 10*3/mm3      Monocytes, Absolute 0.58 10*3/mm3      Eosinophils, Absolute 0.08 10*3/mm3      Basophils, Absolute 0.03 10*3/mm3      Immature Grans, Absolute 0.03 10*3/mm3      nRBC 0.0 /100 WBC     POC Glucose Once [[id number]]  (Normal) Collected: 07/03/23 1936    Specimen: Blood Updated: 07/03/23 1955     Glucose 108 mg/dL      Comment: : [id number] [facility]: [id number]       POC Glucose Once [[id number]]  (Normal) Collected: 07/03/23 1629    Specimen: Blood Updated: 07/03/23 1649     Glucose 84 mg/dL      Comment: : [id number] CARVER ALISEMeter ID: [id number]       POC Glucose Once [[id number]]  (Normal) Collected: 07/03/23 1346    Specimen: Blood Updated: 07/03/23 1636     Glucose 95 mg/dL      Comment: : [id number] CARVER ALISEMeter ID: [id number]       Extra Tubes [[id number]] Collected: 07/03/23 0835    Specimen: Blood, Venous Line Updated: 07/03/23 1619    Narrative:      The following orders were created for panel order Extra Tubes.  Procedure                                Abnormality         Status                     ---------                               -----------         ------                     Light Blue Top[[id number]]                                   Final result                 Please view results for these tests on the individual orders.    Light Blue Top [[id number]] Collected: 07/03/23 0835    Specimen: Blood Updated: 07/03/23 1615     Extra Tube Hold for add-ons.     Comment: Auto resulted       Extra Tubes [[id number]] Collected: 07/03/23 0835    Specimen: Blood, Venous Line Updated: 07/03/23 1615    Narrative:      The following orders were created for panel order Extra Tubes.  Procedure                               Abnormality         Status                     ---------                               -----------         ------                     Green Top (No Gel)[[id number]]                                                          Gold Top - SST[[id number]]                                   Final result                 Please view results for these tests on the individual orders.    Gold Top - SST [[id number]] Collected: 07/03/23 0835    Specimen: Blood Updated: 07/03/23 1615     Extra Tube Hold for add-ons.     Comment: Auto resulted.       High Sensitivity Troponin T 2Hr [[id number]]  (Normal) Collected: 07/03/23 1044    Specimen: Blood Updated: 07/03/23 1111     HS Troponin T 7 ng/L      Troponin T Delta 0 ng/L     Narrative:      High Sensitive Troponin T Reference Range:  <10.0 ng/L- Negative Female for AMI  <15.0 ng/L- Negative Male for AMI  >=10 - Abnormal Female indicating possible myocardial injury.  >=15 - Abnormal Male indicating possible myocardial injury.   Clinicians would have to utilize clinical acumen, EKG, Troponin, and serial changes to determine if it is an Acute Myocardial Infarction or myocardial injury due to an underlying chronic condition.         Urinalysis With Culture If Indicated - Urine, Clean Catch [[id number]]  (Abnormal)  Collected: 07/03/23 0943    Specimen: Urine, Clean Catch Updated: 07/03/23 1003     Color, UA Yellow     Appearance, UA Clear     pH, UA <=5.0     Specific Gravity, UA 1.026     Glucose, UA Negative     Ketones, UA Negative     Bilirubin, UA Negative     Blood, UA Small (1+)     Protein, UA Negative     Leuk Esterase, UA Negative     Nitrite, UA Negative     Urobilinogen, UA 1.0 E.U./dL    Narrative:      In absence of clinical symptoms, the presence of pyuria, bacteria, and/or nitrites on the urinalysis result does not correlate with infection.    Urinalysis, Microscopic Only - Urine, Clean Catch [[id number]]  (Abnormal) Collected: 07/03/23 0943    Specimen: Urine, Clean Catch Updated: 07/03/23 1003     RBC, UA 0-2 /HPF      WBC, UA 0-2 /HPF      Comment: Urine culture not indicated.        Bacteria, UA None Seen /HPF      Squamous Epithelial Cells, UA 0-2 /HPF      Hyaline Casts, UA None Seen /LPF      Methodology Automated Microscopy          Imaging Results (Last 48 Hours)       Procedure Component Value Units Date/Time    MRI abdomen wo contrast mrcp [[id number]] Collected: 07/03/23 1523     Updated: 07/03/23 1559    Narrative:        HISTORY:  Abdominal pain and vomiting.    COMPARISON:  CT abdomen and pelvis 7/3/2023    TECHNIQUE:  Multiplanar, multisequence imaging through the abdomen without administration of  intravenous gadolinium.  3-D imaging of the biliary tree.    FINDINGS:  Gallstones are present.  No choledocholithiasis or biliary dilatation.  Pancreatic duct also appears normal in caliber.  There is edema about the  pancreas.  This extends toward the left and right paracolic gutters.    Background fatty infiltration of the liver but no focal lesion.  Spleen is  normal in size.  The visualized kidneys appear normal.    IMPRESSIONS:  1.  Moderate acute pancreatitis.    2.  Gallstones.  There is gallbladder distention but no evidence of  choledocholithiasis or biliary dilatation.    CT Abdomen Pelvis  With Contrast [[id number]] Collected: 07/03/23 0925     Updated: 07/03/23 1025    Narrative:      History:  Abdominal pain.    comparison:  none.    TECHNIQUE:  Intravenous contrast was administered and axial images from the level of the  diaphragms through the pelvis were performed followed by 2D multiplanar  reformats.    FINDINGS:  The lung bases are clear. There is cholelithiasis. The liver, spleen, kidneys  and adrenals are normal. There is peripancreatic inflammatory change consistent  with acute pancreatitis. There are no dilated loops of bowel.      Impression:      1. Peripancreatic inflammatory change involving the pancreatic tail suggesting  acute pancreatitis.    2. Cholelithiasis.          ECG/EMG Results (last 48 hours)       Procedure Component Value Units Date/Time    ECG 12 Lead Chest Pain [[id number]] Collected: 07/03/23 0814     Updated: 07/03/23 1144     QT Interval 402 ms      QTC Interval 404 ms     Narrative:      Test Reason : CP  Blood Pressure :   */*   mmHG  Vent. Rate :  61 BPM     Atrial Rate :  61 BPM     P-R Int : 158 ms          QRS Dur :  80 ms      QT Int : 402 ms       P-R-T Axes :  88  65  70 degrees     QTc Int : 404 ms    Normal sinus rhythm  Septal infarct , age undetermined  Abnormal ECG  When compared with ECG of 28-DEC-2022 19:18,  Vent. rate has decreased BY  45 BPM  Septal infarct is now Present  QT has shortened    Referred By: ERDALISSA           Confirmed By:     SCANNED EKG [[id number]] Resulted: 07/03/23     Updated: 07/03/23 1839             Physician Progress Notes (last 48 hours)        Clifford Cannon MD at 07/04/23 0845              [facility] Medicine   INPATIENT PROGRESS NOTE      Patient Name: Scott Scanlon  Date of Admission: 7/3/2023  Today's Date: 07/04/23  Length of Stay: 1  Primary Care Physician: Gita Barnard APRN    Subjective   Chief Complaint:   HPI   Overnight course reviewed, breathing stable though  he is wearing the capnograph, the abdominal tenderness and bloating is much better, no nausea or vomiting since being here. Has not had a BM but he has mainly been NPO.  Review of Systems   All pertinent negatives and positives are as above. All other systems have been reviewed and are negative unless otherwise stated.     Objective    Temp:  [98.7 °F (37.1 °C)-99.6 °F (37.6 °C)] 99.6 °F (37.6 °C)  Heart Rate:  [58-72] 72  Resp:  [16-18] 18  BP: (133-174)/(76-90) 148/85  Physical Exam  Reclined in bed, NAD  PERRL, EOMI  Mucosae moist  Breathing stable  Heart rate controlled. Hypertensive  Upper abdomen tenderness better. Softer  Moving all limbs  Skin as before  Results Review:  I have reviewed the labs, radiology results, and diagnostic studies.    Laboratory Data:   Results from last 7 days   Lab Units 07/04/23  0520 07/03/23  0835   WBC 10*3/mm3 7.61 8.08   HEMOGLOBIN g/dL 13.6 15.1   HEMATOCRIT % 42.5 46.5   PLATELETS 10*3/mm3 132* 156        Results from last 7 days   Lab Units 07/04/23  0520 07/03/23  0835   SODIUM mmol/L 138 139   POTASSIUM mmol/L 3.8 3.9   CHLORIDE mmol/L 106 108*   CO2 mmol/L 23.0 22.0   BUN mg/dL 11 13   CREATININE mg/dL 0.84 0.81   CALCIUM mg/dL 8.8 9.3   BILIRUBIN mg/dL 1.6* 2.7*   ALK PHOS U/L 222* 294*   ALT (SGPT) U/L 250* 313*   AST (SGOT) U/L 138* 196*   GLUCOSE mg/dL 143* 141*       Culture Data:   No results found for: BLOODCX  No results found for: URINECX  No results found for: RESPCX  No results found for: WOUNDCX  No results found for: STOOLCX  No components found for: BODYFLD    Radiology Data:   Imaging Results (Last 24 Hours)       Procedure Component Value Units Date/Time    MRI abdomen wo contrast mrcp [[id number]] Collected: 07/03/23 1523     Updated: 07/03/23 1559    Narrative:        HISTORY:  Abdominal pain and vomiting.    COMPARISON:  CT abdomen and pelvis 7/3/2023    TECHNIQUE:  Multiplanar, multisequence imaging through the abdomen without administration  of  intravenous gadolinium.  3-D imaging of the biliary tree.    FINDINGS:  Gallstones are present.  No choledocholithiasis or biliary dilatation.  Pancreatic duct also appears normal in caliber.  There is edema about the  pancreas.  This extends toward the left and right paracolic gutters.    Background fatty infiltration of the liver but no focal lesion.  Spleen is  normal in size.  The visualized kidneys appear normal.    IMPRESSIONS:  1.  Moderate acute pancreatitis.    2.  Gallstones.  There is gallbladder distention but no evidence of  choledocholithiasis or biliary dilatation.    CT Abdomen Pelvis With Contrast [[id number]] Collected: 07/03/23 0925     Updated: 07/03/23 1025    Narrative:      History:  Abdominal pain.    comparison:  none.    TECHNIQUE:  Intravenous contrast was administered and axial images from the level of the  diaphragms through the pelvis were performed followed by 2D multiplanar  reformats.    FINDINGS:  The lung bases are clear. There is cholelithiasis. The liver, spleen, kidneys  and adrenals are normal. There is peripancreatic inflammatory change consistent  with acute pancreatitis. There are no dilated loops of bowel.      Impression:      1. Peripancreatic inflammatory change involving the pancreatic tail suggesting  acute pancreatitis.    2. Cholelithiasis.        I have reviewed the patient's current medications.     Assessment/Plan     Active Hospital Problems    Diagnosis     **Acute pancreatitis, unspecified complication status, unspecified pancreatitis type Clinically improving and the MRCP didn't show any acute gallstone pancreatitis hence he can be treated medically for now with fluids, analgesic or antiemetic    BPH without obstruction/lower urinary tract symptoms Continue flomax    Gallstone No acute cholecystitis at this time hence no need to involve surgery at this time    Steatosis of liver No acute changes at this time    Hypercholesterolemia Statin while here   Stay  today, advance to clear liquids and will see how he can tolerate it.  Electronically signed by Clifford Cannon MD, 07/04/23, 08:46 CDT.      Electronically signed by Clifford Canonn MD at 07/04/23 2025       Medical Student Notes (last 48 hours)  Notes from 07/03/23 1000 through 07/05/23 1000   No notes of this type exist for this encounter.          Consult Notes (last 48 hours)        Jhonny King MD at 07/03/23 1350        Consult Orders    1. Gastroenterology (on-call MD unless specified) [[id number]] ordered by Clifford Cannon MD at 07/03/23 1037                   SUBJECTIVE:   7/3/2023    Name: Scott Scanlon  DOD: 1955    REASON FOR CONSULT: Acute pancreatitis    Chief Complaint:     Chief Complaint   Patient presents with    Abdominal Pain       Subjective     Patient is 67 y.o. male with past medical history of diabetes mellitus, hypercholesterolemia, hyperlipidemia, BPH, GERD presents with acute onset epigastric pain with nausea and vomiting since morning.  Denied diarrhea, constipation, rectal bleeding or weight loss.  Noted to have elevated liver enzymes and lipase.  CT abdomen and pelvis was consistent with acute pancreatitis and cholelithiasis.  Patient drinks sixpack beer per week.  MRCP today was unremarkable for CBD pathology.  WBC and hemoglobin is normal.     ROS/HISTORY/ CURRENT MEDICATIONS/OBJECTIVE/VS/PE:   Review of Systems:   Review of Systems   Constitutional: Negative for chills, fatigue, fever and unexpected weight change.   HENT: Negative for congestion, ear discharge, hearing loss, nosebleeds and sore throat.    Eyes: Negative for pain, discharge and redness.   Respiratory: Negative for cough, chest tightness, shortness of breath and wheezing.    Cardiovascular: Negative for chest pain and palpitations.   Gastrointestinal: Positive for abdominal pain, nausea and vomiting. Negative for abdominal distention, blood in stool, constipation and diarrhea.   Endocrine: Negative for  cold intolerance, polydipsia, polyphagia and polyuria.   Genitourinary: Negative for dysuria, flank pain, frequency, hematuria and urgency.   Musculoskeletal: Negative for arthralgias, back pain, joint swelling and myalgias.   Skin: Negative for color change, pallor and rash.   Neurological: Negative for tremors, seizures, syncope, weakness and headaches.   Hematological: Negative for adenopathy. Does not bruise/bleed easily.   Psychiatric/Behavioral: Negative for behavioral problems, confusion, dysphoric mood, hallucinations and suicidal ideas. The patient is not nervous/anxious.        History:     Past Medical History:   Diagnosis Date    BPH (benign prostatic hyperplasia)     Diabetes mellitus     Hypercholesterolemia     Hyperlipidemia      Past Surgical History:   Procedure Laterality Date    COLONOSCOPY N/A 1/10/2022    COLONOSCOPY N/A 1/4/2023    Procedure: COLONOSCOPY;  Surgeon: Jhonny King MD;  Location: [facility] ENDOSCOPY;  Service: Gastroenterology;  Laterality: N/A;    ENDOSCOPY N/A 1/4/2023    Procedure: ESOPHAGOGASTRODUODENOSCOPY;  Surgeon: Jhonny King MD;  Location: [facility] ENDOSCOPY;  Service: Gastroenterology;  Laterality: N/A;    ENDOSCOPY N/A 3/10/2023    Procedure: ESOPHAGOGASTRODUODENOSCOPY;  Surgeon: Jhonny King MD;  Location: [facility] ENDOSCOPY;  Service: Gastroenterology;  Laterality: N/A;    JOINT REPLACEMENT      KNEE ARTHROSCOPY Left     TOTAL KNEE ARTHROPLASTY Right      Family History   Problem Relation Age of Onset    Hypertension Mother     Hypertension Father      Social History     Tobacco Use    Smoking status: Some Days     Years: 30.00     Types: Cigarettes    Smokeless tobacco: Never   Vaping Use    Vaping Use: Never used   Substance Use Topics    Alcohol use: Yes     Comment: occ beer    Drug use: Never     Medications Prior to Admission   Medication Sig Dispense Refill Last Dose    albuterol sulfate  (90 Base) MCG/ACT inhaler Inhale 2 puffs.   7/3/2023     aspirin 81 MG chewable tablet Chew 1 tablet Daily.   Past Week    cholecalciferol (VITAMIN D3) 1.25 MG (40203 UT) capsule Take 1 capsule by mouth 1 (One) Time Per Week.   7/2/2023    lovastatin (MEVACOR) 40 MG tablet Take 1 tablet by mouth every night at bedtime.   7/2/2023    meloxicam (MOBIC) 7.5 MG tablet Take 1 tablet by mouth Daily.   7/2/2023    metFORMIN (GLUCOPHAGE) 1000 MG tablet Take 1 tablet by mouth.   7/2/2023    multivitamin (THERAGRAN) tablet tablet Take 1 tablet by mouth Daily.   7/2/2023    sucralfate (CARAFATE) 1 g tablet Take 1 tablet by mouth 4 (Four) Times a Day Before Meals & at Bedtime As Needed.   7/3/2023    tamsulosin (FLOMAX) 0.4 MG capsule 24 hr capsule Take 1 capsule by mouth Daily.   7/2/2023     Allergies:  Celecoxib, Corticosteroids, and Penicillins    I have reviewed the patient's medical history, surgical history and family history in the available medical record system.     Current Medications:     Current Facility-Administered Medications   Medication Dose Route Frequency Provider Last Rate Last Admin    albuterol (PROVENTIL) nebulizer solution 0.083% 2.5 mg/3mL  2.5 mg Nebulization Q6H PRN Clifford Cannon MD        aspirin chewable tablet 81 mg  81 mg Oral Daily Clifford Cannon MD   81 mg at 07/03/23 1303    atorvastatin (LIPITOR) tablet 10 mg  10 mg Oral Daily Clifford Cannon MD   10 mg at 07/03/23 1303    sennosides-docusate (PERICOLACE) 8.6-50 MG per tablet 2 tablet  2 tablet Oral BID Clifford Cannon MD        And    polyethylene glycol (MIRALAX) packet 17 g  17 g Oral Daily PRN Clifford Cannon MD        And    bisacodyl (DULCOLAX) EC tablet 5 mg  5 mg Oral Daily PRN Clifford Cannon MD        And    bisacodyl (DULCOLAX) suppository 10 mg  10 mg Rectal Daily PRN Clifford Cannon MD        dextrose (D50W) (25 g/50 mL) IV injection 25 g  25 g Intravenous Q15 Min PRN Clifford Cannon MD        dextrose (GLUTOSE) oral gel 15 g  15 g Oral Q15 Min PRN Clifford Cannon MD        Enoxaparin Sodium  (LOVENOX) syringe 40 mg  40 mg Subcutaneous Daily Clifford Cannon MD   40 mg at 07/03/23 1303    glucagon HCl (Diagnostic) injection 1 mg  1 mg Intramuscular Q15 Min PRN Clifford Cannon MD        Insulin Aspart (novoLOG) injection 0-9 Units  0-9 Units Subcutaneous TID AC Clifford Cannon MD        lactated ringers bolus 1,000 mL  1,000 mL Intravenous Once Clifford Cannon MD        morphine injection 4 mg  4 mg Intravenous Q4H PRN Clifford Cannon MD        multivitamin (THERAGRAN) tablet 1 tablet  1 tablet Oral Daily Clifford Cannon MD   1 tablet at 07/03/23 1303    ondansetron (ZOFRAN) injection 4 mg  4 mg Intravenous Q6H PRN Clifford Cannon MD        sodium chloride 0.9 % flush 10 mL  10 mL Intravenous Q12H Clifford Cannon MD        sodium chloride 0.9 % flush 10 mL  10 mL Intravenous PRN Clifford Cannon MD        sodium chloride 0.9 % infusion 40 mL  40 mL Intravenous PRN Clifford Cannon MD        sodium chloride 0.9 % infusion  125 mL/hr Intravenous Continuous Clifford Cannon  mL/hr at 07/03/23 1036 125 mL/hr at 07/03/23 1036    sucralfate (CARAFATE) tablet 1 g  1 g Oral 4x Daily AC & at Bedtime Clifford Cannon MD   1 g at 07/03/23 1303    tamsulosin (FLOMAX) 24 hr capsule 0.4 mg  0.4 mg Oral Daily Clifford Cannon MD   0.4 mg at 07/03/23 1303       Objective     Physical Exam:   Temp:  [97.7 °F (36.5 °C)-99.3 °F (37.4 °C)] 99.3 °F (37.4 °C)  Heart Rate:  [58-64] 61  Resp:  [18-20] 18  BP: (158-174)/(83-90) 158/90    Physical Exam:  General Appearance:    Alert, cooperative, in no acute distress   Head:    Normocephalic, without obvious abnormality, atraumatic   Eyes:            Lids and lashes normal, conjunctivae and sclerae normal, no   icterus, no pallor, corneas clear, PERRLA   Ears:    Ears appear intact with no abnormalities noted   Throat:   No oral lesions, no thrush, oral mucosa moist   Neck:   No adenopathy, supple, trachea midline, no thyromegaly, no     carotid bruit, no JVD   Back:     No kyphosis present, no scoliosis  present, no skin lesions,       erythema or scars, no tenderness to percussion or                   palpation,   range of motion normal   Lungs:     Clear to auscultation,respirations regular, even and                   unlabored    Heart:    Regular rhythm and normal rate, normal S1 and S2, no            murmur, no gallop, no rub, no click   Breast Exam:    Deferred   Abdomen:     Normal bowel sounds, no masses, no organomegaly, soft        nontender, nondistended, no guarding, no rebound                 tenderness   Genitalia:    Deferred   Extremities:   Moves all extremities well, no edema, no cyanosis, no              redness   Pulses:   Pulses palpable and equal bilaterally   Skin:   No bleeding, bruising or rash   Lymph nodes:   No palpable adenopathy   Neurologic:   Cranial nerves 2 - 12 grossly intact, sensation intact, DTR        present and equal bilaterally      Results Review:     Lab Results   Component Value Date    WBC 8.08 07/03/2023    WBC 11.99 (H) 12/28/2022    WBC 5.45 09/05/2022    HGB 15.1 07/03/2023    HGB 13.5 12/28/2022    HGB 15.0 09/05/2022    HCT 46.5 07/03/2023    HCT 41.4 12/28/2022    HCT 46.4 09/05/2022     07/03/2023     12/28/2022     09/05/2022     Results from last 7 days   Lab Units 07/03/23  0835   ALK PHOS U/L 294*   ALT (SGPT) U/L 313*   AST (SGOT) U/L 196*     Results from last 7 days   Lab Units 07/03/23  0835   BILIRUBIN mg/dL 2.7*   ALK PHOS U/L 294*     Lipase   Date Value Ref Range Status   07/03/2023 >3,000 (H) 13 - 60 U/L Final     Lab Results   Component Value Date    INR 1.23 (H) 01/24/2022         Radiology Review:  Imaging Results (Last 72 Hours)       Procedure Component Value Units Date/Time    CT Abdomen Pelvis With Contrast [[id number]] Collected: 07/03/23 0925     Updated: 07/03/23 1025    Narrative:      History:  Abdominal pain.    comparison:  none.    TECHNIQUE:  Intravenous contrast was administered and axial images from the level of  the  diaphragms through the pelvis were performed followed by 2D multiplanar  reformats.    FINDINGS:  The lung bases are clear. There is cholelithiasis. The liver, spleen, kidneys  and adrenals are normal. There is peripancreatic inflammatory change consistent  with acute pancreatitis. There are no dilated loops of bowel.      Impression:      1. Peripancreatic inflammatory change involving the pancreatic tail suggesting  acute pancreatitis.    2. Cholelithiasis.            I reviewed the patient's new clinical results.    I reviewed the patient's new imaging results and agree with the interpretation.     ASSESSMENT/PLAN:   ASSESSMENT: 1.  Acute pancreatitis, likely due to gallstone passage.  MRCP was unremarkable for CBD stone.  Could also be due to alcohol usage.  2.  GERD, well controlled.  3.  Alcohol usage  PLAN: 1.  Continue bowel rest, pain management, antiemetics and PPI  2.  Surgery consult for cholecystectomy  3.  CIWA protocol  4.  Recommend strict alcohol cessation  The risks, benefits, and alternatives of this procedure have been discussed with the patient or the responsible party. The patient understands and agrees to proceed.         Jhonny King MD  07/03/23  13:50 CDT           Electronically signed by Jhonny King MD at 07/03/23 6435

## 2023-07-05 NOTE — PROGRESS NOTES
[facility] Medicine   INPATIENT PROGRESS NOTE      Patient Name: Scott Scanlon  Date of Admission: 7/3/2023  Today's Date: 07/05/23  Length of Stay: 2  Primary Care Physician: Gita Barnard APRN    Subjective   Chief Complaint:   HPI     He is feeling better. Tolerating liquids. No abdominal pain.     Review of Systems   Gastrointestinal: Positive for abdominal pain.      All pertinent negatives and positives are as above. All other systems have been reviewed and are negative unless otherwise stated.     Objective    Temp:  [97.5 °F (36.4 °C)-98.9 °F (37.2 °C)] 97.8 °F (36.6 °C)  Heart Rate:  [59-81] 59  Resp:  [18] 18  BP: (138-165)/(73-91) 165/91  Physical Exam  Reclined in bed, NAD  PERRL, EOMI  Mucosae moist  Breathing stable  Heart rate controlled. Hypertensive  Upper abdomen tenderness is much improved  Moving all limbs  Skin as before  Results Review:  I have reviewed the labs, radiology results, and diagnostic studies.    Laboratory Data:   Results from last 7 days   Lab Units 07/04/23  0520 07/03/23  0835   WBC 10*3/mm3 7.61 8.08   HEMOGLOBIN g/dL 13.6 15.1   HEMATOCRIT % 42.5 46.5   PLATELETS 10*3/mm3 132* 156        Results from last 7 days   Lab Units 07/04/23  0520 07/03/23  0835   SODIUM mmol/L 138 139   POTASSIUM mmol/L 3.8 3.9   CHLORIDE mmol/L 106 108*   CO2 mmol/L 23.0 22.0   BUN mg/dL 11 13   CREATININE mg/dL 0.84 0.81   CALCIUM mg/dL 8.8 9.3   BILIRUBIN mg/dL 1.6* 2.7*   ALK PHOS U/L 222* 294*   ALT (SGPT) U/L 250* 313*   AST (SGOT) U/L 138* 196*   GLUCOSE mg/dL 143* 141*       Culture Data:   No results found for: BLOODCX  No results found for: URINECX  No results found for: RESPCX  No results found for: WOUNDCX  No results found for: STOOLCX  No components found for: BODYFLD    Radiology Data:   Imaging Results (Last 24 Hours)     ** No results found for the last 24 hours. **      I have reviewed the patient's current medications.      Assessment/Plan     Active Hospital Problems    Diagnosis    • **Acute pancreatitis, unspecified complication status, unspecified pancreatitis type Clinically improving. MRCP reviewed. Has gallstones. Will consult general surgery.    • BPH without obstruction/lower urinary tract symptoms Continue flomax   • Gallstone No acute cholecystitis   • Steatosis of liver No acute changes at this time   • Hypercholesterolemia Statin while here       Dispo: general surgery consulted, advancing diet today     Electronically signed by Zoraida Mena MD, 07/05/23, 14:18 CDT.

## 2023-07-05 NOTE — PROGRESS NOTES
SUBJECTIVE:   7/5/2023  Chief Complaint:     Subjective      Patient is feeling better today.  Abdominal pain has improved.  Denies nausea or vomiting.  LFTs are improving.  Lipase is improving.    History:  Past Medical History:   Diagnosis Date    BPH (benign prostatic hyperplasia)     Diabetes mellitus     Hypercholesterolemia     Hyperlipidemia      Past Surgical History:   Procedure Laterality Date    COLONOSCOPY N/A 1/10/2022    COLONOSCOPY N/A 1/4/2023    Procedure: COLONOSCOPY;  Surgeon: Jhonny King MD;  Location: [facility] ENDOSCOPY;  Service: Gastroenterology;  Laterality: N/A;    ENDOSCOPY N/A 1/4/2023    Procedure: ESOPHAGOGASTRODUODENOSCOPY;  Surgeon: Jhonny King MD;  Location: [facility] ENDOSCOPY;  Service: Gastroenterology;  Laterality: N/A;    ENDOSCOPY N/A 3/10/2023    Procedure: ESOPHAGOGASTRODUODENOSCOPY;  Surgeon: Jhonny King MD;  Location: [facility] ENDOSCOPY;  Service: Gastroenterology;  Laterality: N/A;    JOINT REPLACEMENT      KNEE ARTHROSCOPY Left     TOTAL KNEE ARTHROPLASTY Right      Family History   Problem Relation Age of Onset    Hypertension Mother     Hypertension Father      Social History     Tobacco Use    Smoking status: Some Days     Years: 30.00     Types: Cigarettes    Smokeless tobacco: Never   Vaping Use    Vaping Use: Never used   Substance Use Topics    Alcohol use: Yes     Comment: occ beer    Drug use: Never     Medications Prior to Admission   Medication Sig Dispense Refill Last Dose    albuterol sulfate  (90 Base) MCG/ACT inhaler Inhale 2 puffs.   7/3/2023    aspirin 81 MG chewable tablet Chew 1 tablet Daily.   Past Week    cholecalciferol (VITAMIN D3) 1.25 MG (17929 UT) capsule Take 1 capsule by mouth 1 (One) Time Per Week.   7/2/2023    latanoprost (XALATAN) 0.005 % ophthalmic solution Administer 1 drop to both eyes Every Night.       lovastatin (MEVACOR) 40 MG tablet Take 1 tablet by mouth every night at bedtime.   7/2/2023    meloxicam  (MOBIC) 7.5 MG tablet Take 1 tablet by mouth Daily.   7/2/2023    metFORMIN (GLUCOPHAGE) 1000 MG tablet Take 1 tablet by mouth.   7/2/2023    multivitamin (THERAGRAN) tablet tablet Take 1 tablet by mouth Daily.   7/2/2023    sucralfate (CARAFATE) 1 g tablet Take 1 tablet by mouth 4 (Four) Times a Day Before Meals & at Bedtime As Needed.   7/3/2023    tamsulosin (FLOMAX) 0.4 MG capsule 24 hr capsule Take 1 capsule by mouth Daily.   7/2/2023     Allergies:  Celecoxib, Corticosteroids, and Penicillins     CURRENT MEDICATIONS/OBJECTIVE/VS/PE:     Current Medications:     Current Facility-Administered Medications   Medication Dose Route Frequency Provider Last Rate Last Admin    albuterol (PROVENTIL) nebulizer solution 0.083% 2.5 mg/3mL  2.5 mg Nebulization Q6H PRN Clifford Cannon MD   2.5 mg at 07/04/23 1109    aspirin chewable tablet 81 mg  81 mg Oral Daily Clifford Cannon MD   81 mg at 07/05/23 0823    atorvastatin (LIPITOR) tablet 10 mg  10 mg Oral Daily Clifford Cannon MD   10 mg at 07/05/23 0823    benzocaine (HURRICAINE) 20 % liquid solution 1 spray  1 spray Mouth/Throat Once Maria G Warner CRNA        sennosides-docusate (PERICOLACE) 8.6-50 MG per tablet 2 tablet  2 tablet Oral BID Clifford Cannon MD   2 tablet at 07/04/23 2106    And    polyethylene glycol (MIRALAX) packet 17 g  17 g Oral Daily PRN Clifford Cannon MD        And    bisacodyl (DULCOLAX) EC tablet 5 mg  5 mg Oral Daily PRN Clifford Cannon MD        And    bisacodyl (DULCOLAX) suppository 10 mg  10 mg Rectal Daily PRN Clifford Cannon MD        dextrose (D50W) (25 g/50 mL) IV injection 25 g  25 g Intravenous Q15 Min PRN Clifford Cannon MD        dextrose (GLUTOSE) oral gel 15 g  15 g Oral Q15 Min PRN Clifford Cannon MD        dextrose 5 % and sodium chloride 0.45 % with KCl 20 mEq/L infusion  125 mL/hr Intravenous Continuous Clifford Cannon  mL/hr at 07/05/23 0941 125 mL/hr at 07/05/23 0941    Enoxaparin Sodium (LOVENOX) syringe 40 mg  40 mg Subcutaneous Daily  Clifford Cannon MD   40 mg at 07/05/23 0823    glucagon HCl (Diagnostic) injection 1 mg  1 mg Intramuscular Q15 Min PRN Clifford Cannon MD        Insulin Aspart (novoLOG) injection 0-9 Units  0-9 Units Subcutaneous TID AC Clifford Cannon MD        lactated ringers bolus 1,000 mL  1,000 mL Intravenous Once Clifford Cannon MD        latanoprost (XALATAN) 0.005 % ophthalmic solution 1 drop  1 drop Both Eyes Nightly Clifford Cannon MD   1 drop at 07/04/23 2106    morphine injection 2 mg  2 mg Intravenous Q4H PRN Behroozi, Saeid, MD   2 mg at 07/05/23 0824    morphine injection 4 mg  4 mg Intravenous Q4H PRN Clifford Cannon MD   4 mg at 07/04/23 0859    multivitamin (THERAGRAN) tablet 1 tablet  1 tablet Oral Daily Clifford Cannon MD   1 tablet at 07/05/23 0823    ondansetron (ZOFRAN) injection 4 mg  4 mg Intravenous Q6H PRN Clifford Cannon MD        sodium chloride 0.9 % flush 10 mL  10 mL Intravenous Q12H Clifford Cannon MD   10 mL at 07/04/23 2106    sodium chloride 0.9 % flush 10 mL  10 mL Intravenous PRN Clifford Cannon MD        sodium chloride 0.9 % infusion 40 mL  40 mL Intravenous PRN Clifford Cannno MD        sucralfate (CARAFATE) tablet 1 g  1 g Oral 4x Daily AC & at Bedtime Clifford Cannon MD   1 g at 07/05/23 1105    tamsulosin (FLOMAX) 24 hr capsule 0.4 mg  0.4 mg Oral Daily Clifford Cannon MD   0.4 mg at 07/05/23 0823       Objective     Review of Systems:   Review of Systems   Constitutional:  Negative for chills, fatigue, fever and unexpected weight change.   HENT:  Negative for congestion, ear discharge, hearing loss, nosebleeds and sore throat.    Eyes:  Negative for pain, discharge and redness.   Respiratory:  Negative for cough, chest tightness, shortness of breath and wheezing.    Cardiovascular:  Negative for chest pain and palpitations.   Gastrointestinal:  Positive for abdominal pain. Negative for abdominal distention, blood in stool, constipation, diarrhea, nausea and vomiting.   Endocrine: Negative for cold intolerance,  polydipsia, polyphagia and polyuria.   Genitourinary:  Negative for dysuria, flank pain, frequency, hematuria and urgency.   Musculoskeletal:  Negative for arthralgias, back pain, joint swelling and myalgias.   Skin:  Negative for color change, pallor and rash.   Neurological:  Negative for tremors, seizures, syncope, weakness and headaches.   Hematological:  Negative for adenopathy. Does not bruise/bleed easily.   Psychiatric/Behavioral:  Negative for behavioral problems, confusion, dysphoric mood, hallucinations and suicidal ideas. The patient is not nervous/anxious.      Physical Exam:   Temp:  [97.5 °F (36.4 °C)-98.9 °F (37.2 °C)] 97.8 °F (36.6 °C)  Heart Rate:  [59-81] 59  Resp:  [18] 18  BP: (138-165)/(73-91) 165/91     Physical Exam:  General Appearance:    Alert, cooperative, in no acute distress   Head:    Normocephalic, without obvious abnormality, atraumatic   Eyes:            Lids and lashes normal, conjunctivae and sclerae normal, no   icterus, no pallor, corneas clear, PERRLA   Ears:    Ears appear intact with no abnormalities noted   Throat:   No oral lesions, no thrush, oral mucosa moist   Neck:   No adenopathy, supple, trachea midline, no thyromegaly, no     carotid bruit, no JVD   Back:     No kyphosis present, no scoliosis present, no skin lesions,       erythema or scars, no tenderness to percussion or                   palpation,   range of motion normal   Lungs:     Clear to auscultation,respirations regular, even and                   unlabored    Heart:    Regular rhythm and normal rate, normal S1 and S2, no            murmur, no gallop, no rub, no click   Breast Exam:    Deferred   Abdomen:     Normal bowel sounds, no masses, no organomegaly, soft        nontender, nondistended, no guarding, no rebound                 tenderness   Genitalia:    Deferred   Extremities:   Moves all extremities well, no edema, no cyanosis, no              redness   Pulses:   Pulses palpable and equal  bilaterally   Skin:   No bleeding, bruising or rash   Lymph nodes:   No palpable adenopathy   Neurologic:   Cranial nerves 2 - 12 grossly intact, sensation intact, DTR        present and equal bilaterally      Results Review:     Lab Results (last 24 hours)       Procedure Component Value Units Date/Time    POC Glucose Once [[id number]]  (Abnormal) Collected: 07/05/23 1035    Specimen: Blood Updated: 07/05/23 1100     Glucose 137 mg/dL      Comment: RN NotifiedOperator: [id number] MANJIT KATLINMeter ID: [id number]       POC Glucose Once [[id number]]  (Normal) Collected: 07/05/23 0706    Specimen: Blood Updated: 07/05/23 0729     Glucose 127 mg/dL      Comment: RN NotifiedOperator: [id number] MANJIT KATLINMeter ID: [id number]       POC Glucose Once [[id number]]  (Abnormal) Collected: 07/04/23 1921    Specimen: Blood Updated: 07/04/23 2002     Glucose 176 mg/dL      Comment: RN NotifiedOperator: [id number] GHASSAN GARLANDINAMeter ID: [id number]       POC Glucose Once [[id number]]  (Normal) Collected: 07/04/23 1630    Specimen: Blood Updated: 07/04/23 1807     Glucose 114 mg/dL      Comment: RN NotifiedOperator: [id number] MANJIT HEATHERMeter ID: [id number]                I reviewed the patient's new clinical results.  I reviewed the patient's new imaging results and agree with the interpretation.     ASSESSMENT/PLAN:   ASSESSMENT: 1.  Acute gallstone pancreatitis, improving.  MRCP unremarkable for CBD stone.  2.  Elevated LFTs, improving.  Likely due to gallstone passage or alcoholic hepatitis.  3.  Alcohol usage  4.  GERD    PLAN: 1.  Continue bowel rest, pain management, PPI and antiemetics  2.  Consult surgery for cholecystectomy  3.  Alcohol abstinence  4.  WA protocol  The risks, benefits, and alternatives of this procedure have been discussed with the patient or the responsible party- the patient understands and agrees to proceed.         Jhonny King MD  07/05/23  15:41 CDT

## 2023-07-05 NOTE — PLAN OF CARE
Goal Outcome Evaluation:  Plan of Care Reviewed With: patient           Outcome Evaluation: Pt resting well between care. No new complaints.

## 2023-07-06 LAB
ANION GAP SERPL CALCULATED.3IONS-SCNC: 11 MMOL/L (ref 5–15)
BASOPHILS # BLD AUTO: 0.04 10*3/MM3 (ref 0–0.2)
BASOPHILS NFR BLD AUTO: 0.5 % (ref 0–1.5)
BUN SERPL-MCNC: 7 MG/DL (ref 8–23)
BUN/CREAT SERPL: 8.2 (ref 7–25)
CALCIUM SPEC-SCNC: 9.5 MG/DL (ref 8.6–10.5)
CHLORIDE SERPL-SCNC: 104 MMOL/L (ref 98–107)
CO2 SERPL-SCNC: 23 MMOL/L (ref 22–29)
CREAT SERPL-MCNC: 0.85 MG/DL (ref 0.76–1.27)
DEPRECATED RDW RBC AUTO: 53.1 FL (ref 37–54)
EGFRCR SERPLBLD CKD-EPI 2021: 95.8 ML/MIN/1.73
EOSINOPHIL # BLD AUTO: 0.19 10*3/MM3 (ref 0–0.4)
EOSINOPHIL NFR BLD AUTO: 2.3 % (ref 0.3–6.2)
ERYTHROCYTE [DISTWIDTH] IN BLOOD BY AUTOMATED COUNT: 14.6 % (ref 12.3–15.4)
GLUCOSE BLDC GLUCOMTR-MCNC: 132 MG/DL (ref 70–130)
GLUCOSE BLDC GLUCOMTR-MCNC: 142 MG/DL (ref 70–130)
GLUCOSE BLDC GLUCOMTR-MCNC: 156 MG/DL (ref 70–130)
GLUCOSE BLDC GLUCOMTR-MCNC: 164 MG/DL (ref 70–130)
GLUCOSE BLDC GLUCOMTR-MCNC: 193 MG/DL (ref 70–130)
GLUCOSE SERPL-MCNC: 156 MG/DL (ref 65–99)
HCT VFR BLD AUTO: 46.6 % (ref 37.5–51)
HGB BLD-MCNC: 15 G/DL (ref 13–17.7)
IMM GRANULOCYTES # BLD AUTO: 0.02 10*3/MM3 (ref 0–0.05)
IMM GRANULOCYTES NFR BLD AUTO: 0.2 % (ref 0–0.5)
LYMPHOCYTES # BLD AUTO: 1.76 10*3/MM3 (ref 0.7–3.1)
LYMPHOCYTES NFR BLD AUTO: 21.6 % (ref 19.6–45.3)
MAGNESIUM SERPL-MCNC: 2.1 MG/DL (ref 1.6–2.4)
MCH RBC QN AUTO: 31.4 PG (ref 26.6–33)
MCHC RBC AUTO-ENTMCNC: 32.2 G/DL (ref 31.5–35.7)
MCV RBC AUTO: 97.7 FL (ref 79–97)
MONOCYTES # BLD AUTO: 0.74 10*3/MM3 (ref 0.1–0.9)
MONOCYTES NFR BLD AUTO: 9.1 % (ref 5–12)
NEUTROPHILS NFR BLD AUTO: 5.4 10*3/MM3 (ref 1.7–7)
NEUTROPHILS NFR BLD AUTO: 66.3 % (ref 42.7–76)
NRBC BLD AUTO-RTO: 0 /100 WBC (ref 0–0.2)
PLATELET # BLD AUTO: 161 10*3/MM3 (ref 140–450)
PMV BLD AUTO: 12.2 FL (ref 6–12)
POTASSIUM SERPL-SCNC: 3.8 MMOL/L (ref 3.5–5.2)
RBC # BLD AUTO: 4.77 10*6/MM3 (ref 4.14–5.8)
SODIUM SERPL-SCNC: 138 MMOL/L (ref 136–145)
WBC NRBC COR # BLD: 8.15 10*3/MM3 (ref 3.4–10.8)
WHOLE BLOOD HOLD SPECIMEN: NORMAL

## 2023-07-06 PROCEDURE — 82948 REAGENT STRIP/BLOOD GLUCOSE: CPT

## 2023-07-06 PROCEDURE — 99222 1ST HOSP IP/OBS MODERATE 55: CPT | Performed by: OTOLARYNGOLOGY

## 2023-07-06 PROCEDURE — 99232 SBSQ HOSP IP/OBS MODERATE 35: CPT | Performed by: INTERNAL MEDICINE

## 2023-07-06 PROCEDURE — 83735 ASSAY OF MAGNESIUM: CPT | Performed by: STUDENT IN AN ORGANIZED HEALTH CARE EDUCATION/TRAINING PROGRAM

## 2023-07-06 PROCEDURE — 63710000001 INSULIN ASPART PER 5 UNITS: Performed by: HOSPITALIST

## 2023-07-06 PROCEDURE — 85025 COMPLETE CBC W/AUTO DIFF WBC: CPT | Performed by: STUDENT IN AN ORGANIZED HEALTH CARE EDUCATION/TRAINING PROGRAM

## 2023-07-06 PROCEDURE — 80048 BASIC METABOLIC PNL TOTAL CA: CPT | Performed by: STUDENT IN AN ORGANIZED HEALTH CARE EDUCATION/TRAINING PROGRAM

## 2023-07-06 PROCEDURE — 30901 CONTROL OF NOSEBLEED: CPT | Performed by: OTOLARYNGOLOGY

## 2023-07-06 RX ORDER — LIDOCAINE HYDROCHLORIDE 40 MG/ML
SOLUTION TOPICAL ONCE
Status: COMPLETED | OUTPATIENT
Start: 2023-07-06 | End: 2023-07-06

## 2023-07-06 RX ORDER — CYCLOBENZAPRINE HCL 5 MG
5 TABLET ORAL 3 TIMES DAILY PRN
Status: DISCONTINUED | OUTPATIENT
Start: 2023-07-06 | End: 2023-07-08 | Stop reason: HOSPADM

## 2023-07-06 RX ORDER — OXYMETAZOLINE HYDROCHLORIDE 0.05 G/100ML
2 SPRAY NASAL 2 TIMES DAILY
Status: DISCONTINUED | OUTPATIENT
Start: 2023-07-06 | End: 2023-07-08 | Stop reason: HOSPADM

## 2023-07-06 RX ORDER — ECHINACEA PURPUREA EXTRACT 125 MG
2 TABLET ORAL
Status: DISCONTINUED | OUTPATIENT
Start: 2023-07-06 | End: 2023-07-08 | Stop reason: HOSPADM

## 2023-07-06 RX ORDER — OXYMETAZOLINE HYDROCHLORIDE 0.05 G/100ML
2 SPRAY NASAL AS NEEDED
Status: DISCONTINUED | OUTPATIENT
Start: 2023-07-06 | End: 2023-07-08 | Stop reason: HOSPADM

## 2023-07-06 RX ADMIN — TAMSULOSIN HYDROCHLORIDE 0.4 MG: 0.4 CAPSULE ORAL at 11:36

## 2023-07-06 RX ADMIN — DOCUSATE SODIUM 50 MG AND SENNOSIDES 8.6 MG 2 TABLET: 8.6; 5 TABLET, FILM COATED ORAL at 20:30

## 2023-07-06 RX ADMIN — OXYMETAZOLINE HYDROCHLORIDE 2 SPRAY: 0.05 SPRAY NASAL at 11:36

## 2023-07-06 RX ADMIN — Medication 2 SPRAY: at 20:31

## 2023-07-06 RX ADMIN — INSULIN ASPART 2 UNITS: 100 INJECTION, SOLUTION INTRAVENOUS; SUBCUTANEOUS at 17:57

## 2023-07-06 RX ADMIN — THERA TABS 1 TABLET: TAB at 11:36

## 2023-07-06 RX ADMIN — SUCRALFATE 1 G: 1 TABLET ORAL at 17:57

## 2023-07-06 RX ADMIN — ATORVASTATIN CALCIUM 10 MG: 10 TABLET, FILM COATED ORAL at 11:36

## 2023-07-06 RX ADMIN — POTASSIUM CHLORIDE, DEXTROSE MONOHYDRATE AND SODIUM CHLORIDE 125 ML/HR: 150; 5; 450 INJECTION, SOLUTION INTRAVENOUS at 17:56

## 2023-07-06 RX ADMIN — Medication 2 SPRAY: at 17:57

## 2023-07-06 RX ADMIN — LIDOCAINE HYDROCHLORIDE: 40 SOLUTION TOPICAL at 11:36

## 2023-07-06 RX ADMIN — SUCRALFATE 1 G: 1 TABLET ORAL at 20:31

## 2023-07-06 RX ADMIN — SUCRALFATE 1 G: 1 TABLET ORAL at 11:36

## 2023-07-06 RX ADMIN — Medication 2 SPRAY: at 15:22

## 2023-07-06 RX ADMIN — Medication 10 ML: at 20:32

## 2023-07-06 RX ADMIN — LATANOPROST 1 DROP: 50 SOLUTION OPHTHALMIC at 20:30

## 2023-07-06 NOTE — PROGRESS NOTES
[facility] Medicine   INPATIENT PROGRESS NOTE      Patient Name: Scott Scanlon  Date of Admission: 7/3/2023  Today's Date: 07/06/23  Length of Stay: 3  Primary Care Physician: Gita Barnard APRN    Subjective   Chief Complaint:   Abdominal Pain         Has had a nose bleed since 7am, history of nose bleeds     Review of Systems   Gastrointestinal: Positive for abdominal pain.      All pertinent negatives and positives are as above. All other systems have been reviewed and are negative unless otherwise stated.     Objective    Temp:  [97.6 °F (36.4 °C)-99.5 °F (37.5 °C)] 97.6 °F (36.4 °C)  Heart Rate:  [57-81] 73  Resp:  [16-18] 18  BP: (123-166)/(73-94) 123/73  Physical Exam  Reclined in bed, NAD  PERRL, EOMI  Mucosae moist  Breathing stable  Heart rate controlled. Hypertensive  Upper abdomen tenderness is much improved  Nose bleed - left side  Moving all limbs  Skin as before  Results Review:  I have reviewed the labs, radiology results, and diagnostic studies.    Laboratory Data:   Results from last 7 days   Lab Units 07/06/23  0939 07/04/23  0520 07/03/23  0835   WBC 10*3/mm3 8.15 7.61 8.08   HEMOGLOBIN g/dL 15.0 13.6 15.1   HEMATOCRIT % 46.6 42.5 46.5   PLATELETS 10*3/mm3 161 132* 156        Results from last 7 days   Lab Units 07/06/23  0939 07/04/23  0520 07/03/23  0835   SODIUM mmol/L 138 138 139   POTASSIUM mmol/L 3.8 3.8 3.9   CHLORIDE mmol/L 104 106 108*   CO2 mmol/L 23.0 23.0 22.0   BUN mg/dL 7* 11 13   CREATININE mg/dL 0.85 0.84 0.81   CALCIUM mg/dL 9.5 8.8 9.3   BILIRUBIN mg/dL  --  1.6* 2.7*   ALK PHOS U/L  --  222* 294*   ALT (SGPT) U/L  --  250* 313*   AST (SGOT) U/L  --  138* 196*   GLUCOSE mg/dL 156* 143* 141*       Culture Data:   No results found for: BLOODCX  No results found for: URINECX  No results found for: RESPCX  No results found for: WOUNDCX  No results found for: STOOLCX  No components found for: BODYFLD    Radiology Data:    Imaging Results (Last 24 Hours)     ** No results found for the last 24 hours. **      I have reviewed the patient's current medications.     Assessment/Plan     Active Hospital Problems    Diagnosis    • **Acute pancreatitis, unspecified complication status, unspecified pancreatitis type Clinically improving. MRCP reviewed. Has gallstones. Will consult general surgery.    • BPH without obstruction/lower urinary tract symptoms Continue flomax   • Gallstone No acute cholecystitis   • Steatosis of liver No acute changes at this time   • Hypercholesterolemia Statin while here     Nose Bleed: ENT consulted, packed      Dispo: lap carrie tomorrow     Electronically signed by Zoraida Mena MD, 07/06/23, 15:42 CDT.

## 2023-07-06 NOTE — PLAN OF CARE
Goal Outcome Evaluation:  Plan of Care Reviewed With: patient        Progress: improving  Outcome Evaluation: VSS, resting well, in good spirits

## 2023-07-06 NOTE — NURSING NOTE
Patient noted with white packing in left nose r/t nose bleeding it is dissolvable packing stated per ent Dr de leon do not remove will dissolve , patient to continue nose spray and no blowing nose

## 2023-07-06 NOTE — DISCHARGE INSTRUCTIONS
-Nasal precautions for 2 weeks following bleeding: no nose blowing, no heavy lifting or straining, no nasal manipulation    -Sneeze or cough with your mouth open to decrease pressure in the nose - do not try to suppress a sneeze, this is worse than the sneeze itself.    -Keep nose moisturized with nasal saline frequently, but at least two times daily    -Use a nasal emmollient (Vaseline, Aquaphor, Lanolin, etc) nightly - pea sized amount to each nostril, then pinch to allow breathing through the ointment    -If re-bleeding occurs, Spray Afrin (Oxymetazoline) in each side and hold the fleshy part of the nose for 10 minutes without peeking - can attempt x2. If still bleeding call Dr. Ying at [phone] or return to ER for evaluation.    -You do not need to follow up with Dr. Ying specifically for this concern

## 2023-07-06 NOTE — CONSULTS
Chief complaint: acute epistaxis    Assessment and Plan:  67M with acute left anterior epistaxis s/p absorbable packing today    1. Nasal precautions: no bending, lifting greater than 10 lbs, or straining, this includes for a bowl movement, which may necessitate a stool softener.    2. No using a straw with thick liquids, as this can increase the pressure within the nose.    3. If you must sneeze, do so with the mouth open    4. Use nasal saline spray every 2 hours while awake, more as needed for nasal moisture    5. If re-bleeding occurs, spray Afrin twice and hold anterior pressure for 10 minutes without peeking. Attempt x2.If still bleeding, call Dr. Ying    6. There is no need for outpatient follow up regarding this complaint unless bleeds persist     7.  Patient was extensively reassured that anterior nasal bleeding is incredibly unlikely to lead to life-threatening blood loss, especially in the setting of normal platelet function, minimal blood thinner use, and normal hemoglobin    8.  Discharge instructions have been placed in the patient chart regarding epistaxis care    History of present illness:    Mr. Scanlon is a 67-year-old male currently admitted with pancreatitis undergoing surgical intervention tomorrow being seen at the request of Dr. Mena for evaluation of acute epistaxis.  He states that he had some bleeding yesterday but this stopped, he bent over this morning and started bleeding from the left nostril around 7 AM and it has not stopped since, he has not consistently held anterior pressure but has put a towel to the front of his nose.  He states in the past he has had some nosebleeds but they have never been persistent or consistent.  He denies a known history of nasal trauma or nasal surgery.  He does not have any other history of easy bleeding or bruising.  He does not have any underlying known coagulopathy.  He is very anxious during our encounter and frequently mentions that he is not ready  to meet God.  He has no other acute ENT concerns today.    Vital Signs   Vitals:    07/06/23 1047   BP: 141/83   Pulse: 67   Resp: 16   Temp: 98 °F (36.7 °C)   SpO2: 98%     Physical Exam:  General: Awake and alert, mildly distressed related to active bleeding  Head: normocephalic, atraumatic  Eyes: EOMI, xanthelasma present, arcus senilis present, conjunctivae pink, mild exophthalmos bilaterally symmetrically  Ears: pinnae intact without masses or lesions  Nose: external straight without deformity, there is bleeding from the left anterior lateral nasal wall consistent with local trauma, this is controlled as below, no bleeding from the right naris, no bleeding from the septum   Mucosa pink, not edematous. No polyps seen. No purulence.   Septum: Deviated to the right with 75% obstruction, inferior buckling towards the right   Turbinates: not hypertrophied  OC/OP: mucosa moist and pink, no masses or lesions, tongue is midline and mobile. Tonsils 2+ without exudate. Uvula single and elevates symmetrically.  Neck: supple, no masses or lesions. Thyroid without palpable masses.  Neuro: no focal deficits    Procedure: Control of Epistaxis    Scott Scanlon  7/6/2023    Indications:  Scott Scanlon is an 67 y.o. male on ASA 81 and prophylactic Lovenox with anterior nasal bleeding refractory to towel in front of the nose this morning.    Findings: As above    Anesthesia: Local    Surgeon: Amelia Ying MD    Estimated Blood Loss: 5 mL    Procedure: Control of epistaxis  After informed consent was obtained verbally, the nose was sprayed with oxymetazoline and 4% topical lidocaine. Clot was cleared from the nose and nasopharynx using Arthur suction and bayonet forceps. An unrolled cotton soaked in oxymetazoline was placed within the left naris and anterior pressure held for 10 minutes. This was removed, revealing the area of bleeding at the lateral anterior nasal wall on the left, just anterior to the inferior  turbinate head with a small excoriation overlying the area consistent with local trauma. Surgicel fibrillar was then gently packed over the site of bleeding, achieving hemostasis. The patient tolerated the procedure well.    Results Review:  Spoke with Dr. Mena directly regarding this patient, he had a self-limited episode of bleeding yesterday but restarted after bending over this morning and it has not stopped, patient is very anxious regarding this complaint but normotensive he is not on blood thinning medication outside of prophylactic Lovenox and daily baby aspirin.  He is currently admitted for pancreatitis and scheduled to undergo surgical intervention tomorrow.  CBC from this morning demonstrated hemoglobin of 15 with a crit of 46.6 platelets were within normal limits at 161.    Review of Systems:  Positive ROS items: Epistaxis, abdominal pain, change in bowel habits  Otherwise, a 14 system ROS is negative except as pertinent positives are mentioned above.    Histories:  Allergies   Allergen Reactions    Celecoxib Confusion    Corticosteroids Other (See Comments)     Pt states caused convulsions    Penicillins Confusion       Prior to Admission medications    Medication Sig Start Date End Date Taking? Authorizing Provider   albuterol sulfate  (90 Base) MCG/ACT inhaler Inhale 2 puffs. 7/6/22  Yes Glory Ramos MD   aspirin 81 MG chewable tablet Chew 1 tablet Daily. 1/29/22  Yes Aj Parks MD   cholecalciferol (VITAMIN D3) 1.25 MG (53399 UT) capsule Take 1 capsule by mouth 1 (One) Time Per Week. 7/6/22  Yes Glory Ramos MD   latanoprost (XALATAN) 0.005 % ophthalmic solution Administer 1 drop to both eyes Every Night.   Yes Glory Ramos MD   lovastatin (MEVACOR) 40 MG tablet Take 1 tablet by mouth every night at bedtime. 7/6/22  Yes Glory Ramos MD   meloxicam (MOBIC) 7.5 MG tablet Take 1 tablet by mouth Daily.   Yes Glory Ramos MD   metFORMIN  (GLUCOPHAGE) 1000 MG tablet Take 1 tablet by mouth. 7/6/22  Yes ProviderGlory MD   multivitamin (THERAGRAN) tablet tablet Take 1 tablet by mouth Daily.   Yes ProviderGlory MD   sucralfate (CARAFATE) 1 g tablet Take 1 tablet by mouth 4 (Four) Times a Day Before Meals & at Bedtime As Needed. 2/7/23  Yes Glory Ramos MD   tamsulosin (FLOMAX) 0.4 MG capsule 24 hr capsule Take 1 capsule by mouth Daily. 1/29/22  Yes Aj Parks MD       Past Medical History:   Diagnosis Date    BPH (benign prostatic hyperplasia)     Diabetes mellitus     Hypercholesterolemia     Hyperlipidemia        Past Surgical History:   Procedure Laterality Date    COLONOSCOPY N/A 1/10/2022    COLONOSCOPY N/A 1/4/2023    Procedure: COLONOSCOPY;  Surgeon: Jhonny King MD;  Location: [facility] ENDOSCOPY;  Service: Gastroenterology;  Laterality: N/A;    ENDOSCOPY N/A 1/4/2023    Procedure: ESOPHAGOGASTRODUODENOSCOPY;  Surgeon: Jhonny King MD;  Location: [facility] ENDOSCOPY;  Service: Gastroenterology;  Laterality: N/A;    ENDOSCOPY N/A 3/10/2023    Procedure: ESOPHAGOGASTRODUODENOSCOPY;  Surgeon: Jhonny King MD;  Location: [facility] ENDOSCOPY;  Service: Gastroenterology;  Laterality: N/A;    JOINT REPLACEMENT      KNEE ARTHROSCOPY Left     TOTAL KNEE ARTHROPLASTY Right        Social History     Socioeconomic History    Marital status: Single   Tobacco Use    Smoking status: Some Days     Years: 30.00     Types: Cigarettes    Smokeless tobacco: Never   Vaping Use    Vaping Use: Never used   Substance and Sexual Activity    Alcohol use: Yes     Comment: occ beer    Drug use: Never    Sexual activity: Defer       Family History   Problem Relation Age of Onset    Hypertension Mother     Hypertension Father        Immunization status not specifically asked and therefore not specifically documented.    Voice dictation disclaimer:  Voice dictation was used in the creation of this note.  As such, there may be typos or  inappropriate words throughout the document.  The document is proofread for typos and errors, however some may not be caught.      This document has been electronically signed by Amelia Ying MD on July 6, 2023 11:53 CDT

## 2023-07-06 NOTE — PLAN OF CARE
Goal Outcome Evaluation:              Outcome Evaluation: labs monitored, ENt seen today for nose bleed, pain managed, able to communicate needs, skin warm and dry

## 2023-07-06 NOTE — PROGRESS NOTES
SUBJECTIVE:   7/6/2023  Chief Complaint:     Subjective      Patient is feeling better today.  Abdominal pain has improved.  Tolerating diet.  Scheduled for cholecystectomy in a.m.    History:  Past Medical History:   Diagnosis Date   • BPH (benign prostatic hyperplasia)    • Diabetes mellitus    • Hypercholesterolemia    • Hyperlipidemia      Past Surgical History:   Procedure Laterality Date   • COLONOSCOPY N/A 1/10/2022   • COLONOSCOPY N/A 1/4/2023    Procedure: COLONOSCOPY;  Surgeon: Jhonny King MD;  Location: [facility] ENDOSCOPY;  Service: Gastroenterology;  Laterality: N/A;   • ENDOSCOPY N/A 1/4/2023    Procedure: ESOPHAGOGASTRODUODENOSCOPY;  Surgeon: Jhonny King MD;  Location: [facility] ENDOSCOPY;  Service: Gastroenterology;  Laterality: N/A;   • ENDOSCOPY N/A 3/10/2023    Procedure: ESOPHAGOGASTRODUODENOSCOPY;  Surgeon: Jhonny King MD;  Location: [facility] ENDOSCOPY;  Service: Gastroenterology;  Laterality: N/A;   • JOINT REPLACEMENT     • KNEE ARTHROSCOPY Left    • TOTAL KNEE ARTHROPLASTY Right      Family History   Problem Relation Age of Onset   • Hypertension Mother    • Hypertension Father      Social History     Tobacco Use   • Smoking status: Some Days     Years: 30.00     Types: Cigarettes   • Smokeless tobacco: Never   Vaping Use   • Vaping Use: Never used   Substance Use Topics   • Alcohol use: Yes     Comment: occ beer   • Drug use: Never     Medications Prior to Admission   Medication Sig Dispense Refill Last Dose   • albuterol sulfate  (90 Base) MCG/ACT inhaler Inhale 2 puffs.   7/3/2023   • aspirin 81 MG chewable tablet Chew 1 tablet Daily.   Past Week   • cholecalciferol (VITAMIN D3) 1.25 MG (82827 UT) capsule Take 1 capsule by mouth 1 (One) Time Per Week.   7/2/2023   • latanoprost (XALATAN) 0.005 % ophthalmic solution Administer 1 drop to both eyes Every Night.      • lovastatin (MEVACOR) 40 MG tablet Take 1 tablet by mouth every night at bedtime.   7/2/2023   •  meloxicam (MOBIC) 7.5 MG tablet Take 1 tablet by mouth Daily.   7/2/2023   • metFORMIN (GLUCOPHAGE) 1000 MG tablet Take 1 tablet by mouth.   7/2/2023   • multivitamin (THERAGRAN) tablet tablet Take 1 tablet by mouth Daily.   7/2/2023   • sucralfate (CARAFATE) 1 g tablet Take 1 tablet by mouth 4 (Four) Times a Day Before Meals & at Bedtime As Needed.   7/3/2023   • tamsulosin (FLOMAX) 0.4 MG capsule 24 hr capsule Take 1 capsule by mouth Daily.   7/2/2023     Allergies:  Celecoxib, Corticosteroids, and Penicillins     CURRENT MEDICATIONS/OBJECTIVE/VS/PE:     Current Medications:     Current Facility-Administered Medications   Medication Dose Route Frequency Provider Last Rate Last Admin   • albuterol (PROVENTIL) nebulizer solution 0.083% 2.5 mg/3mL  2.5 mg Nebulization Q6H PRN Clifford Cannon MD   2.5 mg at 07/04/23 1109   • aspirin chewable tablet 81 mg  81 mg Oral Daily Clifford Cannon MD   81 mg at 07/05/23 0823   • atorvastatin (LIPITOR) tablet 10 mg  10 mg Oral Daily Clifford Cannon MD   10 mg at 07/06/23 1136   • benzocaine (HURRICAINE) 20 % liquid solution 1 spray  1 spray Mouth/Throat Once Maria G Warner CRNA       • sennosides-docusate (PERICOLACE) 8.6-50 MG per tablet 2 tablet  2 tablet Oral BID Clifford Cannon MD   2 tablet at 07/05/23 2006    And   • polyethylene glycol (MIRALAX) packet 17 g  17 g Oral Daily PRN Clifford Cannon MD        And   • bisacodyl (DULCOLAX) EC tablet 5 mg  5 mg Oral Daily PRN Clifford Cannon MD        And   • bisacodyl (DULCOLAX) suppository 10 mg  10 mg Rectal Daily PRN Clifford Cannon MD       • cyclobenzaprine (FLEXERIL) tablet 5 mg  5 mg Oral TID PRN Zoraida Mena MD       • dextrose (D50W) (25 g/50 mL) IV injection 25 g  25 g Intravenous Q15 Min PRN Clifford Cannon MD       • dextrose (GLUTOSE) oral gel 15 g  15 g Oral Q15 Min PRN Clifford Cannon MD       • dextrose 5 % and sodium chloride 0.45 % with KCl 20 mEq/L infusion  125 mL/hr Intravenous Continuous Clifford Cannon  mL/hr  at 07/06/23 1024 125 mL/hr at 07/06/23 1024   • glucagon HCl (Diagnostic) injection 1 mg  1 mg Intramuscular Q15 Min PRN Clfiford Cannon MD       • Insulin Aspart (novoLOG) injection 0-9 Units  0-9 Units Subcutaneous TID AC Clifford Cannon MD       • lactated ringers bolus 1,000 mL  1,000 mL Intravenous Once Clifford Cannon MD       • latanoprost (XALATAN) 0.005 % ophthalmic solution 1 drop  1 drop Both Eyes Nightly Clifford Cannon MD   1 drop at 07/05/23 2008   • morphine injection 2 mg  2 mg Intravenous Q4H PRN Behroozi, Saeid, MD   2 mg at 07/05/23 0824   • morphine injection 4 mg  4 mg Intravenous Q4H PRN Clifford Cannon MD   4 mg at 07/04/23 0859   • multivitamin (THERAGRAN) tablet 1 tablet  1 tablet Oral Daily Clifford Cannon MD   1 tablet at 07/06/23 1136   • ondansetron (ZOFRAN) injection 4 mg  4 mg Intravenous Q6H PRN Clifford Cannon MD       • oxymetazoline (AFRIN) nasal spray 2 spray  2 spray Each Nare BID Amelia Ying MD   2 spray at 07/06/23 1136   • oxymetazoline (AFRIN) nasal spray 2 spray  2 spray Each Nare PRN Amelia Ying MD       • sodium chloride 0.9 % flush 10 mL  10 mL Intravenous Q12H Clifford Cannon MD   10 mL at 07/04/23 2106   • sodium chloride 0.9 % flush 10 mL  10 mL Intravenous PRN Clifford Cannon MD       • sodium chloride 0.9 % infusion 40 mL  40 mL Intravenous PRN Clifford Cannon MD       • sodium chloride nasal spray 2 spray  2 spray Each Nare Q2H While Awake Amelia Ying MD       • sucralfate (CARAFATE) tablet 1 g  1 g Oral 4x Daily AC & at Bedtime Clifford Cannon MD   1 g at 07/06/23 1136   • tamsulosin (FLOMAX) 24 hr capsule 0.4 mg  0.4 mg Oral Daily Clifford Cannon MD   0.4 mg at 07/06/23 1136       Objective     Review of Systems:   Review of Systems   Constitutional: Negative for chills, fatigue, fever and unexpected weight change.   HENT: Negative for congestion, ear discharge, hearing loss, nosebleeds and sore throat.    Eyes: Negative for pain, discharge and redness.   Respiratory: Negative for  cough, chest tightness, shortness of breath and wheezing.    Cardiovascular: Negative for chest pain and palpitations.   Gastrointestinal: Negative for abdominal distention, abdominal pain, blood in stool, constipation, diarrhea, nausea and vomiting.   Endocrine: Negative for cold intolerance, polydipsia, polyphagia and polyuria.   Genitourinary: Negative for dysuria, flank pain, frequency, hematuria and urgency.   Musculoskeletal: Negative for arthralgias, back pain, joint swelling and myalgias.   Skin: Negative for color change, pallor and rash.   Neurological: Negative for tremors, seizures, syncope, weakness and headaches.   Hematological: Negative for adenopathy. Does not bruise/bleed easily.   Psychiatric/Behavioral: Negative for behavioral problems, confusion, dysphoric mood, hallucinations and suicidal ideas. The patient is not nervous/anxious.        Physical Exam:   Temp:  [97.6 °F (36.4 °C)-99.5 °F (37.5 °C)] 97.6 °F (36.4 °C)  Heart Rate:  [57-81] 73  Resp:  [16-18] 18  BP: (123-166)/(73-94) 123/73     Physical Exam:  General Appearance:    Alert, cooperative, in no acute distress   Head:    Normocephalic, without obvious abnormality, atraumatic   Eyes:            Lids and lashes normal, conjunctivae and sclerae normal, no   icterus, no pallor, corneas clear, PERRLA   Ears:    Ears appear intact with no abnormalities noted   Throat:   No oral lesions, no thrush, oral mucosa moist   Neck:   No adenopathy, supple, trachea midline, no thyromegaly, no     carotid bruit, no JVD   Back:     No kyphosis present, no scoliosis present, no skin lesions,       erythema or scars, no tenderness to percussion or                   palpation,   range of motion normal   Lungs:     Clear to auscultation,respirations regular, even and                   unlabored    Heart:    Regular rhythm and normal rate, normal S1 and S2, no            murmur, no gallop, no rub, no click   Breast Exam:    Deferred   Abdomen:     Normal  bowel sounds, no masses, no organomegaly, soft        nontender, nondistended, no guarding, no rebound                 tenderness   Genitalia:    Deferred   Extremities:   Moves all extremities well, no edema, no cyanosis, no              redness   Pulses:   Pulses palpable and equal bilaterally   Skin:   No bleeding, bruising or rash   Lymph nodes:   No palpable adenopathy   Neurologic:   Cranial nerves 2 - 12 grossly intact, sensation intact, DTR        present and equal bilaterally      Results Review:     Lab Results (last 24 hours)     Procedure Component Value Units Date/Time    POC Glucose Once [[id number]]  (Abnormal) Collected: 07/06/23 1101    Specimen: Blood Updated: 07/06/23 1116     Glucose 142 mg/dL      Comment: RN NotifiedOperator: [id number] SELAM ANGELITAMeter ID: [id number]       Extra Tubes [[id number]] Collected: 07/06/23 0944    Specimen: Blood, Venous Line Updated: 07/06/23 1045    Narrative:      The following orders were created for panel order Extra Tubes.  Procedure                               Abnormality         Status                     ---------                               -----------         ------                     Lavender Top[[id number]]                                     Final result                 Please view results for these tests on the individual orders.    Lavender Top [[id number]] Collected: 07/06/23 0944    Specimen: Blood Updated: 07/06/23 1045     Extra Tube hold for add-on     Comment: Auto resulted       Magnesium [[id number]]  (Normal) Collected: 07/06/23 0939    Specimen: Blood Updated: 07/06/23 1007     Magnesium 2.1 mg/dL     Basic Metabolic Panel [[id number]]  (Abnormal) Collected: 07/06/23 0939    Specimen: Blood Updated: 07/06/23 1007     Glucose 156 mg/dL      BUN 7 mg/dL      Creatinine 0.85 mg/dL      Sodium 138 mmol/L      Potassium 3.8 mmol/L      Chloride 104 mmol/L      CO2 23.0 mmol/L      Calcium 9.5 mg/dL      BUN/Creatinine Ratio 8.2     Anion Gap  11.0 mmol/L      eGFR 95.8 mL/min/1.73     Narrative:      GFR Normal >60  Chronic Kidney Disease <60  Kidney Failure <15      CBC & Differential [[id number]]  (Abnormal) Collected: 07/06/23 0939    Specimen: Blood Updated: 07/06/23 0947    Narrative:      The following orders were created for panel order CBC & Differential.  Procedure                               Abnormality         Status                     ---------                               -----------         ------                     CBC Auto Differential[[id number]]        Abnormal            Final result                 Please view results for these tests on the individual orders.    CBC Auto Differential [[id number]]  (Abnormal) Collected: 07/06/23 0939    Specimen: Blood Updated: 07/06/23 0947     WBC 8.15 10*3/mm3      RBC 4.77 10*6/mm3      Hemoglobin 15.0 g/dL      Hematocrit 46.6 %      MCV 97.7 fL      MCH 31.4 pg      MCHC 32.2 g/dL      RDW 14.6 %      RDW-SD 53.1 fl      MPV 12.2 fL      Platelets 161 10*3/mm3      Neutrophil % 66.3 %      Lymphocyte % 21.6 %      Monocyte % 9.1 %      Eosinophil % 2.3 %      Basophil % 0.5 %      Immature Grans % 0.2 %      Neutrophils, Absolute 5.40 10*3/mm3      Lymphocytes, Absolute 1.76 10*3/mm3      Monocytes, Absolute 0.74 10*3/mm3      Eosinophils, Absolute 0.19 10*3/mm3      Basophils, Absolute 0.04 10*3/mm3      Immature Grans, Absolute 0.02 10*3/mm3      nRBC 0.0 /100 WBC     POC Glucose Once [[id number]]  (Abnormal) Collected: 07/06/23 0711    Specimen: Blood Updated: 07/06/23 0926     Glucose 156 mg/dL      Comment: RN NotifiedOperator: [id number] ANATOLIY MONTIELAHMeter ID: [id number]       POC Glucose Once [[id number]]  (Abnormal) Collected: 07/05/23 1926    Specimen: Blood Updated: 07/06/23 0925     Glucose 164 mg/dL      Comment: RN NotifiedOperator: [id number] LONNY HARRISFERMeter ID: [id number]       POC Glucose Once [[id number]]  (Normal) Collected: 07/05/23 1627    Specimen: Blood Updated:  07/05/23 1816     Glucose 109 mg/dL      Comment: RN NotifiedOperator: [id number] MANJIT SIMMONSMeter ID: [id number]              I reviewed the patient's new clinical results.  I reviewed the patient's new imaging results and agree with the interpretation.     ASSESSMENT/PLAN:   ASSESSMENT: 1.  Acute gallstone pancreatitis, resolved.  2.  Gallstones, for cholecystectomy in a.m.  3.  Alcohol use  4.  Elevated liver enzymes, improving  PLAN: 1.  Continue PPI and current supportive care  2.  Low-fat diet as tolerated  3.  Cholecystectomy per surgery  4.  Recommend alcohol abstinence  The risks, benefits, and alternatives of this procedure have been discussed with the patient or the responsible party- the patient understands and agrees to proceed.         Jhonny King MD  07/06/23  14:29 CDT

## 2023-07-07 LAB
ANION GAP SERPL CALCULATED.3IONS-SCNC: 10 MMOL/L (ref 5–15)
BASOPHILS # BLD AUTO: 0.05 10*3/MM3 (ref 0–0.2)
BASOPHILS NFR BLD AUTO: 0.7 % (ref 0–1.5)
BUN SERPL-MCNC: 7 MG/DL (ref 8–23)
BUN/CREAT SERPL: 9 (ref 7–25)
CALCIUM SPEC-SCNC: 9 MG/DL (ref 8.6–10.5)
CHLORIDE SERPL-SCNC: 103 MMOL/L (ref 98–107)
CO2 SERPL-SCNC: 25 MMOL/L (ref 22–29)
CREAT SERPL-MCNC: 0.78 MG/DL (ref 0.76–1.27)
DEPRECATED RDW RBC AUTO: 52 FL (ref 37–54)
EGFRCR SERPLBLD CKD-EPI 2021: 98.4 ML/MIN/1.73
EOSINOPHIL # BLD AUTO: 0.19 10*3/MM3 (ref 0–0.4)
EOSINOPHIL NFR BLD AUTO: 2.7 % (ref 0.3–6.2)
ERYTHROCYTE [DISTWIDTH] IN BLOOD BY AUTOMATED COUNT: 14.3 % (ref 12.3–15.4)
FOLATE SERPL-MCNC: 18.2 NG/ML (ref 4.78–24.2)
GLUCOSE BLDC GLUCOMTR-MCNC: 134 MG/DL (ref 70–130)
GLUCOSE BLDC GLUCOMTR-MCNC: 167 MG/DL (ref 70–130)
GLUCOSE BLDC GLUCOMTR-MCNC: 215 MG/DL (ref 70–130)
GLUCOSE SERPL-MCNC: 155 MG/DL (ref 65–99)
HCT VFR BLD AUTO: 41.5 % (ref 37.5–51)
HGB BLD-MCNC: 13.6 G/DL (ref 13–17.7)
IMM GRANULOCYTES # BLD AUTO: 0.03 10*3/MM3 (ref 0–0.05)
IMM GRANULOCYTES NFR BLD AUTO: 0.4 % (ref 0–0.5)
LYMPHOCYTES # BLD AUTO: 1.8 10*3/MM3 (ref 0.7–3.1)
LYMPHOCYTES NFR BLD AUTO: 25.8 % (ref 19.6–45.3)
MCH RBC QN AUTO: 32.2 PG (ref 26.6–33)
MCHC RBC AUTO-ENTMCNC: 32.8 G/DL (ref 31.5–35.7)
MCV RBC AUTO: 98.3 FL (ref 79–97)
MONOCYTES # BLD AUTO: 0.66 10*3/MM3 (ref 0.1–0.9)
MONOCYTES NFR BLD AUTO: 9.5 % (ref 5–12)
NEUTROPHILS NFR BLD AUTO: 4.25 10*3/MM3 (ref 1.7–7)
NEUTROPHILS NFR BLD AUTO: 60.9 % (ref 42.7–76)
NRBC BLD AUTO-RTO: 0 /100 WBC (ref 0–0.2)
PLATELET # BLD AUTO: 182 10*3/MM3 (ref 140–450)
PMV BLD AUTO: 12.3 FL (ref 6–12)
POTASSIUM SERPL-SCNC: 4.1 MMOL/L (ref 3.5–5.2)
RBC # BLD AUTO: 4.22 10*6/MM3 (ref 4.14–5.8)
SODIUM SERPL-SCNC: 138 MMOL/L (ref 136–145)
VIT B12 BLD-MCNC: 1362 PG/ML (ref 211–946)
WBC NRBC COR # BLD: 6.98 10*3/MM3 (ref 3.4–10.8)

## 2023-07-07 PROCEDURE — 25010000002 HYDROMORPHONE 1 MG/ML SOLUTION: Performed by: ANESTHESIOLOGY

## 2023-07-07 PROCEDURE — 0FT44ZZ RESECTION OF GALLBLADDER, PERCUTANEOUS ENDOSCOPIC APPROACH: ICD-10-PCS | Performed by: STUDENT IN AN ORGANIZED HEALTH CARE EDUCATION/TRAINING PROGRAM

## 2023-07-07 PROCEDURE — 94640 AIRWAY INHALATION TREATMENT: CPT

## 2023-07-07 PROCEDURE — 63710000001 INSULIN ASPART PER 5 UNITS: Performed by: HOSPITALIST

## 2023-07-07 PROCEDURE — 25010000002 INDOCYANINE GREEN 25 MG RECONSTITUTED SOLUTION: Performed by: STUDENT IN AN ORGANIZED HEALTH CARE EDUCATION/TRAINING PROGRAM

## 2023-07-07 PROCEDURE — 82746 ASSAY OF FOLIC ACID SERUM: CPT | Performed by: STUDENT IN AN ORGANIZED HEALTH CARE EDUCATION/TRAINING PROGRAM

## 2023-07-07 PROCEDURE — 85025 COMPLETE CBC W/AUTO DIFF WBC: CPT | Performed by: STUDENT IN AN ORGANIZED HEALTH CARE EDUCATION/TRAINING PROGRAM

## 2023-07-07 PROCEDURE — 82948 REAGENT STRIP/BLOOD GLUCOSE: CPT

## 2023-07-07 PROCEDURE — 80048 BASIC METABOLIC PNL TOTAL CA: CPT | Performed by: STUDENT IN AN ORGANIZED HEALTH CARE EDUCATION/TRAINING PROGRAM

## 2023-07-07 PROCEDURE — S2900 ROBOTIC SURGICAL SYSTEM: HCPCS | Performed by: STUDENT IN AN ORGANIZED HEALTH CARE EDUCATION/TRAINING PROGRAM

## 2023-07-07 PROCEDURE — 47562 LAPAROSCOPIC CHOLECYSTECTOMY: CPT | Performed by: STUDENT IN AN ORGANIZED HEALTH CARE EDUCATION/TRAINING PROGRAM

## 2023-07-07 PROCEDURE — 99231 SBSQ HOSP IP/OBS SF/LOW 25: CPT | Performed by: INTERNAL MEDICINE

## 2023-07-07 PROCEDURE — 8E0W4CZ ROBOTIC ASSISTED PROCEDURE OF TRUNK REGION, PERCUTANEOUS ENDOSCOPIC APPROACH: ICD-10-PCS | Performed by: STUDENT IN AN ORGANIZED HEALTH CARE EDUCATION/TRAINING PROGRAM

## 2023-07-07 PROCEDURE — 99232 SBSQ HOSP IP/OBS MODERATE 35: CPT | Performed by: STUDENT IN AN ORGANIZED HEALTH CARE EDUCATION/TRAINING PROGRAM

## 2023-07-07 PROCEDURE — 82607 VITAMIN B-12: CPT | Performed by: STUDENT IN AN ORGANIZED HEALTH CARE EDUCATION/TRAINING PROGRAM

## 2023-07-07 PROCEDURE — 88304 TISSUE EXAM BY PATHOLOGIST: CPT

## 2023-07-07 DEVICE — CLIP LIG HEMOLOK PA LG 6CT PRP: Type: IMPLANTABLE DEVICE | Site: ABDOMEN | Status: FUNCTIONAL

## 2023-07-07 RX ORDER — PROMETHAZINE HYDROCHLORIDE 25 MG/1
25 SUPPOSITORY RECTAL ONCE AS NEEDED
Status: DISCONTINUED | OUTPATIENT
Start: 2023-07-07 | End: 2023-07-07 | Stop reason: HOSPADM

## 2023-07-07 RX ORDER — PROMETHAZINE HYDROCHLORIDE 25 MG/1
25 TABLET ORAL ONCE AS NEEDED
Status: DISCONTINUED | OUTPATIENT
Start: 2023-07-07 | End: 2023-07-07 | Stop reason: HOSPADM

## 2023-07-07 RX ORDER — INDOCYANINE GREEN AND WATER 25 MG
5 KIT INJECTION ONCE
Status: COMPLETED | OUTPATIENT
Start: 2023-07-07 | End: 2023-07-07

## 2023-07-07 RX ORDER — ALBUTEROL SULFATE 2.5 MG/3ML
2.5 SOLUTION RESPIRATORY (INHALATION) ONCE
Status: COMPLETED | OUTPATIENT
Start: 2023-07-07 | End: 2023-07-07

## 2023-07-07 RX ORDER — ACETAMINOPHEN 325 MG/1
650 TABLET ORAL ONCE AS NEEDED
Status: DISCONTINUED | OUTPATIENT
Start: 2023-07-07 | End: 2023-07-07 | Stop reason: HOSPADM

## 2023-07-07 RX ORDER — EPHEDRINE SULFATE 50 MG/ML
5 INJECTION, SOLUTION INTRAVENOUS ONCE AS NEEDED
Status: DISCONTINUED | OUTPATIENT
Start: 2023-07-07 | End: 2023-07-07 | Stop reason: HOSPADM

## 2023-07-07 RX ORDER — ONDANSETRON 2 MG/ML
4 INJECTION INTRAMUSCULAR; INTRAVENOUS ONCE AS NEEDED
Status: DISCONTINUED | OUTPATIENT
Start: 2023-07-07 | End: 2023-07-07 | Stop reason: HOSPADM

## 2023-07-07 RX ORDER — LIDOCAINE HYDROCHLORIDE AND EPINEPHRINE 10; 10 MG/ML; UG/ML
INJECTION, SOLUTION INFILTRATION; PERINEURAL AS NEEDED
Status: DISCONTINUED | OUTPATIENT
Start: 2023-07-07 | End: 2023-07-07 | Stop reason: HOSPADM

## 2023-07-07 RX ORDER — BUPIVACAINE HCL/0.9 % NACL/PF 0.1 %
2 PLASTIC BAG, INJECTION (ML) EPIDURAL ONCE
Status: COMPLETED | OUTPATIENT
Start: 2023-07-07 | End: 2023-07-07

## 2023-07-07 RX ORDER — SODIUM CHLORIDE 9 MG/ML
50 INJECTION, SOLUTION INTRAVENOUS CONTINUOUS
Status: DISCONTINUED | OUTPATIENT
Start: 2023-07-07 | End: 2023-07-08 | Stop reason: HOSPADM

## 2023-07-07 RX ORDER — FLUMAZENIL 0.1 MG/ML
0.2 INJECTION INTRAVENOUS AS NEEDED
Status: DISCONTINUED | OUTPATIENT
Start: 2023-07-07 | End: 2023-07-07 | Stop reason: HOSPADM

## 2023-07-07 RX ORDER — DIPHENHYDRAMINE HYDROCHLORIDE 50 MG/ML
12.5 INJECTION INTRAMUSCULAR; INTRAVENOUS
Status: DISCONTINUED | OUTPATIENT
Start: 2023-07-07 | End: 2023-07-07 | Stop reason: HOSPADM

## 2023-07-07 RX ORDER — NALOXONE HCL 0.4 MG/ML
0.4 VIAL (ML) INJECTION AS NEEDED
Status: DISCONTINUED | OUTPATIENT
Start: 2023-07-07 | End: 2023-07-07 | Stop reason: HOSPADM

## 2023-07-07 RX ADMIN — SUCRALFATE 1 G: 1 TABLET ORAL at 08:34

## 2023-07-07 RX ADMIN — POTASSIUM CHLORIDE, DEXTROSE MONOHYDRATE AND SODIUM CHLORIDE 125 ML/HR: 150; 5; 450 INJECTION, SOLUTION INTRAVENOUS at 10:05

## 2023-07-07 RX ADMIN — HYDROMORPHONE HYDROCHLORIDE 0.5 MG: 1 INJECTION, SOLUTION INTRAMUSCULAR; INTRAVENOUS; SUBCUTANEOUS at 16:12

## 2023-07-07 RX ADMIN — Medication 2 SPRAY: at 08:33

## 2023-07-07 RX ADMIN — Medication 2 SPRAY: at 12:45

## 2023-07-07 RX ADMIN — ALBUTEROL SULFATE 2.5 MG: 2.5 SOLUTION RESPIRATORY (INHALATION) at 14:14

## 2023-07-07 RX ADMIN — INSULIN ASPART 2 UNITS: 100 INJECTION, SOLUTION INTRAVENOUS; SUBCUTANEOUS at 08:33

## 2023-07-07 RX ADMIN — TAMSULOSIN HYDROCHLORIDE 0.4 MG: 0.4 CAPSULE ORAL at 09:31

## 2023-07-07 RX ADMIN — HYDROMORPHONE HYDROCHLORIDE 0.5 MG: 1 INJECTION, SOLUTION INTRAMUSCULAR; INTRAVENOUS; SUBCUTANEOUS at 16:33

## 2023-07-07 RX ADMIN — ATORVASTATIN CALCIUM 10 MG: 10 TABLET, FILM COATED ORAL at 08:34

## 2023-07-07 RX ADMIN — INDOCYANINE GREEN AND WATER 5 MG: KIT at 14:14

## 2023-07-07 RX ADMIN — Medication 2 SPRAY: at 05:27

## 2023-07-07 RX ADMIN — Medication 2 SPRAY: at 22:00

## 2023-07-07 RX ADMIN — Medication 2 SPRAY: at 10:06

## 2023-07-07 RX ADMIN — THERA TABS 1 TABLET: TAB at 08:34

## 2023-07-07 RX ADMIN — Medication 2 SPRAY: at 20:00

## 2023-07-07 RX ADMIN — SODIUM CHLORIDE 50 ML/HR: 9 INJECTION, SOLUTION INTRAVENOUS at 14:15

## 2023-07-07 RX ADMIN — HYDROMORPHONE HYDROCHLORIDE 0.5 MG: 1 INJECTION, SOLUTION INTRAMUSCULAR; INTRAVENOUS; SUBCUTANEOUS at 16:02

## 2023-07-07 RX ADMIN — POTASSIUM CHLORIDE, DEXTROSE MONOHYDRATE AND SODIUM CHLORIDE 125 ML/HR: 150; 5; 450 INJECTION, SOLUTION INTRAVENOUS at 21:09

## 2023-07-07 RX ADMIN — OXYMETAZOLINE HYDROCHLORIDE 2 SPRAY: 0.05 SPRAY NASAL at 09:35

## 2023-07-07 RX ADMIN — Medication 2 SPRAY: at 18:00

## 2023-07-07 RX ADMIN — HYDROMORPHONE HYDROCHLORIDE 0.5 MG: 1 INJECTION, SOLUTION INTRAMUSCULAR; INTRAVENOUS; SUBCUTANEOUS at 16:22

## 2023-07-07 RX ADMIN — LATANOPROST 1 DROP: 50 SOLUTION OPHTHALMIC at 20:32

## 2023-07-07 NOTE — PLAN OF CARE
Goal Outcome Evaluation:           Progress: improving       Pt appears to be doing well this date.  No nosebleeds reported; V/S remaining WNL for patient.  No c/o pain or discomfort at this time.  Has slept soundly throughout night. Eager for discharge but voices some anxiety related to upcoming surgery.

## 2023-07-07 NOTE — PLAN OF CARE
Goal Outcome Evaluation:   Cholecystectomy done today. Four laparoscopic sites (abdomen )  with steri strips noted. Denies pain at this time. VSS. Call light and phone in reach.

## 2023-07-07 NOTE — PROGRESS NOTES
GENERAL SURGERY PROGRESS NOTE     LOS: 4 days         Interval History:     No acute events overnight    Medication Review:   [MAR Hold] aspirin, 81 mg, Oral, Daily  [MAR Hold] atorvastatin, 10 mg, Oral, Daily  [MAR Hold] benzocaine, 1 spray, Mouth/Throat, Once  ceFAZolin, 2 g, Intravenous, Once  [MAR Hold] Insulin Aspart, 0-9 Units, Subcutaneous, TID AC  [MAR Hold] lactated ringers, 1,000 mL, Intravenous, Once  [MAR Hold] latanoprost, 1 drop, Both Eyes, Nightly  [MAR Hold] multivitamin, 1 tablet, Oral, Daily  [MAR Hold] oxymetazoline, 2 spray, Each Nare, BID  [MAR Hold] senna-docusate sodium, 2 tablet, Oral, BID  [MAR Hold] sodium chloride, 10 mL, Intravenous, Q12H  [MAR Hold] sodium chloride, 2 spray, Each Nare, Q2H While Awake  [MAR Hold] sucralfate, 1 g, Oral, 4x Daily AC & at Bedtime  [MAR Hold] tamsulosin, 0.4 mg, Oral, Daily        dextrose 5 % and sodium chloride 0.45 % with KCl 20 mEq/L, 125 mL/hr, Last Rate: 125 mL/hr (07/07/23 1005)  sodium chloride, 50 mL/hr, Last Rate: 50 mL/hr (07/07/23 1415)    Objective     Vital Signs:  Temp:  [97.6 °F (36.4 °C)-98.2 °F (36.8 °C)] 97.8 °F (36.6 °C)  Heart Rate:  [53-73] 53  Resp:  [18] 18  BP: (123-158)/(70-93) 150/85  No intake or output data in the 24 hours ending 07/07/23 1420    Physical Exam  Constitutional:       Appearance: Normal appearance.   HENT:      Head: Normocephalic and atraumatic.      Nose: Nose normal. No congestion.      Mouth/Throat:      Mouth: Mucous membranes are moist.      Pharynx: Oropharynx is clear.   Eyes:      General: No scleral icterus.     Pupils: Pupils are equal, round, and reactive to light.   Cardiovascular:      Rate and Rhythm: Normal rate and regular rhythm.   Pulmonary:      Effort: Pulmonary effort is normal. No respiratory distress.   Skin:     General: Skin is warm and dry.   Neurological:      General: No focal deficit present.      Mental Status: He is alert and oriented to person, place, and time.   Psychiatric:          Mood and Affect: Mood normal.         Behavior: Behavior normal.       Results Review:    Results from last 7 days   Lab Units 07/07/23  0902 07/06/23  0939 07/04/23  0520   SODIUM mmol/L 138 138 138   POTASSIUM mmol/L 4.1 3.8 3.8   CHLORIDE mmol/L 103 104 106   CO2 mmol/L 25.0 23.0 23.0   BUN mg/dL 7* 7* 11   CREATININE mg/dL 0.78 0.85 0.84   GLUCOSE mg/dL 155* 156* 143*   CALCIUM mg/dL 9.0 9.5 8.8     Results from last 7 days   Lab Units 07/07/23  0902 07/06/23  0939 07/04/23  0520   WBC 10*3/mm3 6.98 8.15 7.61   HEMOGLOBIN g/dL 13.6 15.0 13.6   HEMATOCRIT % 41.5 46.6 42.5   PLATELETS 10*3/mm3 182 161 132*       Assessment:    Acute pancreatitis, unspecified complication status, unspecified pancreatitis type    Hypercholesterolemia    Gallstone    Steatosis of liver    BPH without obstruction/lower urinary tract symptoms      68 yo gentleman with gallstones pancreatitis    Plan:    Will plan on robotic assisted cholecystectomy today. We rediscussed the risks of the surgery. Which are bleeding, infection, damage to surrounding structures (common bile duct or intestine), pain and scarring. The benefits are likely resolution of the pain as well as prevention of complications such as pancreatitis, cholangitis, acute cholecystitis and other biliary pathology. Alternatively we could not operate on the patient, however this puts her at risk of the above complications. The patient understands these risks and wishes to proceed with robotic assisted cholecystectomy.        This document has been electronically signed by Raymon Dennison MD on July 7, 2023 14:20 CDT        Raymon Dennison MD  07/07/23  14:20 CDT

## 2023-07-07 NOTE — PROGRESS NOTES
[facility]   INPATIENT PROGRESS NOTE      Patient Name: Scott Scanlon  Date of Admission: 7/3/2023  Today's Date: 07/07/23  Length of Stay: 4  Primary Care Physician: Gita Barnard APRN    Subjective   Chief Complaint:   Abdominal Pain       Nose bleed has resolved  No abdominal pain       Review of Systems   Gastrointestinal: Positive for abdominal pain.      All pertinent negatives and positives are as above. All other systems have been reviewed and are negative unless otherwise stated.     Objective    Temp:  [97.6 °F (36.4 °C)-98.2 °F (36.8 °C)] 97.8 °F (36.6 °C)  Heart Rate:  [53-73] 53  Resp:  [18] 18  BP: (123-158)/(70-93) 150/85  Physical Exam  Reclined in bed, NAD  PERRL, EOMI  Mucosae moist  Breathing stable  Heart rate controlled. Hypertensive  Moving all limbs  Skin as before  Results Review:  I have reviewed the labs, radiology results, and diagnostic studies.    Laboratory Data:   Results from last 7 days   Lab Units 07/07/23  0902 07/06/23  0939 07/04/23  0520   WBC 10*3/mm3 6.98 8.15 7.61   HEMOGLOBIN g/dL 13.6 15.0 13.6   HEMATOCRIT % 41.5 46.6 42.5   PLATELETS 10*3/mm3 182 161 132*        Results from last 7 days   Lab Units 07/07/23  0902 07/06/23  0939 07/04/23  0520 07/03/23  0835   SODIUM mmol/L 138 138 138 139   POTASSIUM mmol/L 4.1 3.8 3.8 3.9   CHLORIDE mmol/L 103 104 106 108*   CO2 mmol/L 25.0 23.0 23.0 22.0   BUN mg/dL 7* 7* 11 13   CREATININE mg/dL 0.78 0.85 0.84 0.81   CALCIUM mg/dL 9.0 9.5 8.8 9.3   BILIRUBIN mg/dL  --   --  1.6* 2.7*   ALK PHOS U/L  --   --  222* 294*   ALT (SGPT) U/L  --   --  250* 313*   AST (SGOT) U/L  --   --  138* 196*   GLUCOSE mg/dL 155* 156* 143* 141*       Culture Data:   No results found for: BLOODCX  No results found for: URINECX  No results found for: RESPCX  No results found for: WOUNDCX  No results found for: STOOLCX  No components found for: BODYFLD    Radiology Data:   Imaging  Results (Last 24 Hours)     ** No results found for the last 24 hours. **      I have reviewed the patient's current medications.     Assessment/Plan     Active Hospital Problems    Diagnosis    • **Acute pancreatitis, unspecified complication status, unspecified pancreatitis type Clinically improving. MRCP reviewed. Has gallstones. Will consult general surgery. Plan for OR today.    • BPH without obstruction/lower urinary tract symptoms Continue flomax   • Gallstone No acute cholecystitis   • Steatosis of liver No acute changes at this time   • Hypercholesterolemia Statin while here     Nose Bleed: resolved      Dispo: carri carrie today, home soon      Electronically signed by Zoraida Mena MD, 07/07/23, 11:16 CDT.

## 2023-07-07 NOTE — OP NOTE
CHOLECYSTECTOMY LAPAROSCOPIC WITH DAVINCI ROBOT  Procedure Note    Scott Scanlon  7/7/2023    Pre-op Diagnosis:   Acute pancreatitis, unspecified complication status, unspecified pancreatitis type [K85.90]  Calculus of gallbladder with biliary obstruction but without cholecystitis [K80.21]    Post-op Diagnosis:     Post-Op Diagnosis Codes:     * Acute pancreatitis, unspecified complication status, unspecified pancreatitis type [K85.90]     * Calculus of gallbladder with biliary obstruction but without cholecystitis [K80.21]    Procedure/CPT® Codes:      Procedure(s):  CHOLECYSTECTOMY LAPAROSCOPIC WITH DAVINCI ROBOT    Surgeon(s):  Raymon Dennison MD    Anesthesia: General    Staff:   Circulator: Jeromy Riley RN; Olive Solano RN; Sally Felix RN  Scrub Person: Claudia Caldwell RN  Assistant: Seble Henson    Assistant: Seble Henson was responsible for performing the following activities: Retraction, Suction, Irrigation, Suturing, Closing, and Placing Dressing and their skilled assistance was necessary for the success of this case.     Estimated Blood Loss: minimal    Specimens:                ID Type Source Tests Collected by Time   A (Not marked as sent) :  Tissue Gallbladder TISSUE EXAM, [facility] ([facility]) Raymon Dennison MD 7/7/2023 1539         Drains: * No LDAs found *    Findings: Cholecystitis     Complications: none    INDICATION:  68 yo gentleman with gallstone pancreatitis. He presents today for cholecystectomy.     DESCRIPTION:  The patient was brought to the operating room and placed supine on the operating table. After adequate general endotracheal anesthesia, the abdominal area was prepped and draped in a sterile manner. A briefing and timeout were performed and all parties were in agreement.     A left subcostal transverse incision was made in the midclavicular line approximately 2cm caudally from the costal margin. A 8 mm robotic optical trocar was uneventfully passed  into the abdomen under direct vision with no visceral injury. The abdomen was insufflated to a total of 15 mmHg, 4.8 L of CO2. A 5 mm laparoscope revealed an excellent view of the upper and lower abdominal areas. Three additional robotic trocars were then placed under direct visualization triangulating the gallbladder in the right upper quadrant. The bed was then tilted in reverse Trendelenburg and tipped to the left. The patient tolerated the positioning and insufflation without difficulty. The robot was then docked.    The gallbladder was retracted upwards and outwards by grasping the top and the infundibulum of the gallbladder with a ProGrasp passed through the 4th port. The peritoneum around the infundibulum was taken down for a distance of 1/3 of the length of the gallbladder on the anterior and posterior sides. The cystic duct and artery easily came into view as did the 5th segment of the liver behind these structures.     The cystic duct was doubly clipped and divided. The cystic artery was cauterized and divided in all branches. The gallbladder was then dissected out of the liver bed using electrocautery. Once it had been nearly completely excised, pressure in the abdomen was reduced to 8 mmHg with no further bleeding being noted from the liver bed. The remainder of the gallbladder was dissected free.  It was  brought out intact through one of the ports..     All areas were visualized for bleeding and bile leak. None was seen. The patient was then placed supine.  All trocars were removed and the abdomen was allowed to flatten. All port sites were closed with 4-0 subcuticular on the skin.    The procedure was terminated. It was well tolerated. Sponge and needle counts were correct, and the patient was transferred to recovery in satisfactory condition.          This document has been electronically signed by Raymon Dennison MD on July 7, 2023 15:48 CDT             Date: 7/7/2023  Time: 15:48 CDT

## 2023-07-07 NOTE — PAYOR COMM NOTE
"  Rere Shaw RN [facility]  [phone]      Phone  [phone]     Fax  Cont. [facility]    Karina Meza (67 y.o. Male)     Date of Birth   1955    Social Security Number       Address   [address] [address]    Home Phone   [phone]    MRN   [id number]       Orthodoxy   Jew    Marital Status   Single                            Admission Date   7/3/23    Admission Type   Emergency    Admitting Provider   Clifford Cannon MD    Attending Provider   Zoraida Mena MD    Department, Room/Bed   [facility] [address] [place], 427/1       Discharge Date       Discharge Disposition       Discharge Destination                               Attending Provider: Zoraida Mena MD    Allergies: Celecoxib, Corticosteroids, Penicillins    Isolation: None   Infection: None   Code Status: CPR    Ht: 182.9 cm (72\")   Wt: 112 kg (246 lb)    Admission Cmt: None   Principal Problem: Acute pancreatitis, unspecified complication status, unspecified pancreatitis type [K85.90]                 Active Insurance as of 7/3/2023     Primary Coverage     Payor Plan Insurance Group Employer/Plan Group    ANTHEM MEDICARE REPLACEMENT ANTHEM MEDICARE ADVANTAGE [id number]     Payor Plan Address Payor Plan Phone Number Payor Plan Fax Number Effective Dates    [address] [phone]  3/1/2021 - None Entered    [facility] [address]       Subscriber Name Subscriber Birth Date Member ID       KARINA MEZA 1955 [id number]           Secondary Coverage     Payor Plan Insurance Group Employer/Plan Group    [place]      Payor Plan Address Payor Plan Phone Number Payor Plan Fax Number Effective Dates    [address] [phone]  9/1/2020 - None Entered    [facility] [id number]       Subscriber Name Subscriber Birth Date Member ID       KARINA MEZA 1955 [id number]           "       Emergency Contacts      (Rel.) Home Phone Work Phone Mobile Phone    LYNNE VO (Friend) [phone] -- [phone]            Vital Signs (last day)     Date/Time Temp Temp src Pulse Resp BP Patient Position SpO2    07/07/23 1036 97.8 (36.6) Temporal 53 18 150/85 Lying 99    07/07/23 0829 98 (36.7) Temporal 70 18 158/87 Sitting 97    07/07/23 0332 98.2 (36.8) Temporal -- 18 132/70 Lying 94    07/06/23 1900 97.9 (36.6) Temporal 66 18 133/93 Lying 99    07/06/23 1423 97.6 (36.4) Temporal 73 18 123/73 Lying 96    07/06/23 1047 98 (36.7) Temporal 67 16 141/83 Sitting 98    07/06/23 0348 99.5 (37.5) Temporal 73 18 148/89 Lying 96          Oxygen Therapy (last day)     Date/Time SpO2 Device (Oxygen Therapy) Flow (L/min) Oxygen Concentration (%) ETCO2 (mmHg)    07/07/23 1036 99 room air -- -- --    07/07/23 0829 97 room air -- -- --    07/07/23 0800 -- room air -- -- --    07/07/23 0332 94 room air -- -- --    07/06/23 2000 -- room air -- -- --    07/06/23 1900 99 room air -- -- --    07/06/23 1423 96 room air -- -- --    07/06/23 1047 98 room air -- -- --    07/06/23 0348 96 room air -- -- --          Current Facility-Administered Medications   Medication Dose Route Frequency Provider Last Rate Last Admin   • albuterol (PROVENTIL) nebulizer solution 0.083% 2.5 mg/3mL  2.5 mg Nebulization Q6H PRN Clifford Cannon MD   2.5 mg at 07/04/23 1109   • albuterol (PROVENTIL) nebulizer solution 0.083% 2.5 mg/3mL  2.5 mg Nebulization Once Todd Ortiz MD       • aspirin chewable tablet 81 mg  81 mg Oral Daily Clifford Cannon MD   81 mg at 07/05/23 0823   • atorvastatin (LIPITOR) tablet 10 mg  10 mg Oral Daily Clifford Cannon MD   10 mg at 07/07/23 0834   • benzocaine (HURRICAINE) 20 % liquid solution 1 spray  1 spray Mouth/Throat Once Maria G Warner, CRNA       • sennosides-docusate (PERICOLACE) 8.6-50 MG per tablet 2 tablet  2 tablet Oral BID Clifford Cannon MD   2 tablet at 07/06/23 2030    And   •  polyethylene glycol (MIRALAX) packet 17 g  17 g Oral Daily PRN Clifford Cannon MD        And   • bisacodyl (DULCOLAX) EC tablet 5 mg  5 mg Oral Daily PRN Clifford Cannon MD        And   • bisacodyl (DULCOLAX) suppository 10 mg  10 mg Rectal Daily PRN Clifford Cannon MD       • ceFAZolin in 0.9% normal saline (ANCEF) IVPB solution 2 g  2 g Intravenous Once Raymon Dennison MD       • cyclobenzaprine (FLEXERIL) tablet 5 mg  5 mg Oral TID PRN Zoraida Mena MD       • dextrose (D50W) (25 g/50 mL) IV injection 25 g  25 g Intravenous Q15 Min PRN Clifford Cannon MD       • dextrose (GLUTOSE) oral gel 15 g  15 g Oral Q15 Min PRN Clifford Cannon MD       • dextrose 5 % and sodium chloride 0.45 % with KCl 20 mEq/L infusion  125 mL/hr Intravenous Continuous Clifford Cannon  mL/hr at 07/07/23 1005 125 mL/hr at 07/07/23 1005   • glucagon HCl (Diagnostic) injection 1 mg  1 mg Intramuscular Q15 Min PRN Clifford Cannon MD       • indocyanine green (IC-GREEN) injection 5 mg  5 mg Intravenous Once Raymon Dennison MD       • Insulin Aspart (novoLOG) injection 0-9 Units  0-9 Units Subcutaneous TID AC Clifford Cannon MD   2 Units at 07/07/23 0833   • lactated ringers bolus 1,000 mL  1,000 mL Intravenous Once Clifford Cannon MD       • latanoprost (XALATAN) 0.005 % ophthalmic solution 1 drop  1 drop Both Eyes Nightly Clifofrd Cannon MD   1 drop at 07/06/23 2030   • morphine injection 2 mg  2 mg Intravenous Q4H PRN Behroozi, Saeid, MD   2 mg at 07/05/23 0824   • morphine injection 4 mg  4 mg Intravenous Q4H PRN Clifford Cannon MD   4 mg at 07/04/23 0859   • multivitamin (THERAGRAN) tablet 1 tablet  1 tablet Oral Daily Clifford Cannon MD   1 tablet at 07/07/23 0834   • ondansetron (ZOFRAN) injection 4 mg  4 mg Intravenous Q6H PRN Clifford Cannon MD       • oxymetazoline (AFRIN) nasal spray 2 spray  2 spray Each Nare BID Amelia Ying MD   2 spray at 07/07/23 0935   • oxymetazoline (AFRIN) nasal spray 2 spray  2 spray Each Nare PRN Amelia Ying MD        • sodium chloride 0.9 % flush 10 mL  10 mL Intravenous Q12H Clifford Cannon MD   10 mL at 07/06/23 2032   • sodium chloride 0.9 % flush 10 mL  10 mL Intravenous PRN Clifford Cannon MD       • sodium chloride 0.9 % infusion 40 mL  40 mL Intravenous PRN Clifford Cannon MD       • sodium chloride nasal spray 2 spray  2 spray Each Nare Q2H While Awake Amelia Ying MD   2 spray at 07/07/23 1006   • sucralfate (CARAFATE) tablet 1 g  1 g Oral 4x Daily AC & at Bedtime Clifford Cannon MD   1 g at 07/07/23 0834   • tamsulosin (FLOMAX) 24 hr capsule 0.4 mg  0.4 mg Oral Daily Clifford Cannon MD   0.4 mg at 07/07/23 0931        Physician Progress Notes (last 48 hours)      Zoraida Mena MD at 07/07/23 1116              [facility]   INPATIENT PROGRESS NOTE      Patient Name: Scott Scanlon  Date of Admission: 7/3/2023  Today's Date: 07/07/23  Length of Stay: 4  Primary Care Physician: Gita Barnard APRN    Subjective   Chief Complaint:   Abdominal Pain       Nose bleed has resolved  No abdominal pain       Review of Systems   Gastrointestinal: Positive for abdominal pain.      All pertinent negatives and positives are as above. All other systems have been reviewed and are negative unless otherwise stated.     Objective    Temp:  [97.6 °F (36.4 °C)-98.2 °F (36.8 °C)] 97.8 °F (36.6 °C)  Heart Rate:  [53-73] 53  Resp:  [18] 18  BP: (123-158)/(70-93) 150/85  Physical Exam  Reclined in bed, NAD  PERRL, EOMI  Mucosae moist  Breathing stable  Heart rate controlled. Hypertensive  Moving all limbs  Skin as before  Results Review:  I have reviewed the labs, radiology results, and diagnostic studies.    Laboratory Data:   Results from last 7 days   Lab Units 07/07/23  0902 07/06/23  0939 07/04/23  0520   WBC 10*3/mm3 6.98 8.15 7.61   HEMOGLOBIN g/dL 13.6 15.0 13.6   HEMATOCRIT % 41.5 46.6 42.5   PLATELETS 10*3/mm3 182 161 132*        Results from last 7 days   Lab Units  07/07/23  0902 07/06/23  0939 07/04/23  0520 07/03/23  0835   SODIUM mmol/L 138 138 138 139   POTASSIUM mmol/L 4.1 3.8 3.8 3.9   CHLORIDE mmol/L 103 104 106 108*   CO2 mmol/L 25.0 23.0 23.0 22.0   BUN mg/dL 7* 7* 11 13   CREATININE mg/dL 0.78 0.85 0.84 0.81   CALCIUM mg/dL 9.0 9.5 8.8 9.3   BILIRUBIN mg/dL  --   --  1.6* 2.7*   ALK PHOS U/L  --   --  222* 294*   ALT (SGPT) U/L  --   --  250* 313*   AST (SGOT) U/L  --   --  138* 196*   GLUCOSE mg/dL 155* 156* 143* 141*       Culture Data:   No results found for: BLOODCX  No results found for: URINECX  No results found for: RESPCX  No results found for: WOUNDCX  No results found for: STOOLCX  No components found for: BODYFLD    Radiology Data:   Imaging Results (Last 24 Hours)     ** No results found for the last 24 hours. **      I have reviewed the patient's current medications.     Assessment/Plan     Active Hospital Problems    Diagnosis    • **Acute pancreatitis, unspecified complication status, unspecified pancreatitis type Clinically improving. MRCP reviewed. Has gallstones. Will consult general surgery. Plan for OR today.    • BPH without obstruction/lower urinary tract symptoms Continue flomax   • Gallstone No acute cholecystitis   • Steatosis of liver No acute changes at this time   • Hypercholesterolemia Statin while here     Nose Bleed: resolved      Dispo: lap carrie today, home soon      Electronically signed by Zoraida Mena MD, 07/07/23, 11:16 CDT.      Electronically signed by Zoraida Mena MD at 07/07/23 1117     Zoraida Mena MD at 07/06/23 1542              [facility] Medicine   INPATIENT PROGRESS NOTE      Patient Name: Scott Scanlon  Date of Admission: 7/3/2023  Today's Date: 07/06/23  Length of Stay: 3  Primary Care Physician: Evon, Gita J, APRN    Subjective   Chief Complaint:   Abdominal Pain         Has had a nose bleed since 7am, history of nose bleeds     Review  of Systems   Gastrointestinal: Positive for abdominal pain.      All pertinent negatives and positives are as above. All other systems have been reviewed and are negative unless otherwise stated.     Objective    Temp:  [97.6 °F (36.4 °C)-99.5 °F (37.5 °C)] 97.6 °F (36.4 °C)  Heart Rate:  [57-81] 73  Resp:  [16-18] 18  BP: (123-166)/(73-94) 123/73  Physical Exam  Reclined in bed, NAD  PERRL, EOMI  Mucosae moist  Breathing stable  Heart rate controlled. Hypertensive  Upper abdomen tenderness is much improved  Nose bleed - left side  Moving all limbs  Skin as before  Results Review:  I have reviewed the labs, radiology results, and diagnostic studies.    Laboratory Data:   Results from last 7 days   Lab Units 07/06/23  0939 07/04/23  0520 07/03/23  0835   WBC 10*3/mm3 8.15 7.61 8.08   HEMOGLOBIN g/dL 15.0 13.6 15.1   HEMATOCRIT % 46.6 42.5 46.5   PLATELETS 10*3/mm3 161 132* 156        Results from last 7 days   Lab Units 07/06/23  0939 07/04/23  0520 07/03/23  0835   SODIUM mmol/L 138 138 139   POTASSIUM mmol/L 3.8 3.8 3.9   CHLORIDE mmol/L 104 106 108*   CO2 mmol/L 23.0 23.0 22.0   BUN mg/dL 7* 11 13   CREATININE mg/dL 0.85 0.84 0.81   CALCIUM mg/dL 9.5 8.8 9.3   BILIRUBIN mg/dL  --  1.6* 2.7*   ALK PHOS U/L  --  222* 294*   ALT (SGPT) U/L  --  250* 313*   AST (SGOT) U/L  --  138* 196*   GLUCOSE mg/dL 156* 143* 141*       Culture Data:   No results found for: BLOODCX  No results found for: URINECX  No results found for: RESPCX  No results found for: WOUNDCX  No results found for: STOOLCX  No components found for: BODYFLD    Radiology Data:   Imaging Results (Last 24 Hours)     ** No results found for the last 24 hours. **      I have reviewed the patient's current medications.     Assessment/Plan     Active Hospital Problems    Diagnosis    • **Acute pancreatitis, unspecified complication status, unspecified pancreatitis type Clinically improving. MRCP reviewed. Has gallstones. Will consult general surgery.    • BPH  without obstruction/lower urinary tract symptoms Continue flomax   • Gallstone No acute cholecystitis   • Steatosis of liver No acute changes at this time   • Hypercholesterolemia Statin while here     Nose Bleed: ENT consulted, packed      Dispo: lap carrie tomorrow     Electronically signed by Zoraida Mena MD, 07/06/23, 15:42 CDT.      Electronically signed by Zoraida Mena MD at 07/06/23 1544     Jhonny King MD at 07/05/23 1541                  SUBJECTIVE:   7/5/2023  Chief Complaint:     Subjective      Patient is feeling better today.  Abdominal pain has improved.  Denies nausea or vomiting.  LFTs are improving.  Lipase is improving.    History:  Past Medical History:   Diagnosis Date   • BPH (benign prostatic hyperplasia)    • Diabetes mellitus    • Hypercholesterolemia    • Hyperlipidemia      Past Surgical History:   Procedure Laterality Date   • COLONOSCOPY N/A 1/10/2022   • COLONOSCOPY N/A 1/4/2023    Procedure: COLONOSCOPY;  Surgeon: Jhonny King MD;  Location: [facility] ENDOSCOPY;  Service: Gastroenterology;  Laterality: N/A;   • ENDOSCOPY N/A 1/4/2023    Procedure: ESOPHAGOGASTRODUODENOSCOPY;  Surgeon: Jhonny King MD;  Location: [facility] ENDOSCOPY;  Service: Gastroenterology;  Laterality: N/A;   • ENDOSCOPY N/A 3/10/2023    Procedure: ESOPHAGOGASTRODUODENOSCOPY;  Surgeon: Jhonny King MD;  Location: [facility] ENDOSCOPY;  Service: Gastroenterology;  Laterality: N/A;   • JOINT REPLACEMENT     • KNEE ARTHROSCOPY Left    • TOTAL KNEE ARTHROPLASTY Right      Family History   Problem Relation Age of Onset   • Hypertension Mother    • Hypertension Father      Social History     Tobacco Use   • Smoking status: Some Days     Years: 30.00     Types: Cigarettes   • Smokeless tobacco: Never   Vaping Use   • Vaping Use: Never used   Substance Use Topics   • Alcohol use: Yes     Comment: occ beer   • Drug use: Never     Medications Prior to Admission   Medication Sig Dispense Refill  Last Dose   • albuterol sulfate  (90 Base) MCG/ACT inhaler Inhale 2 puffs.   7/3/2023   • aspirin 81 MG chewable tablet Chew 1 tablet Daily.   Past Week   • cholecalciferol (VITAMIN D3) 1.25 MG (74741 UT) capsule Take 1 capsule by mouth 1 (One) Time Per Week.   7/2/2023   • latanoprost (XALATAN) 0.005 % ophthalmic solution Administer 1 drop to both eyes Every Night.      • lovastatin (MEVACOR) 40 MG tablet Take 1 tablet by mouth every night at bedtime.   7/2/2023   • meloxicam (MOBIC) 7.5 MG tablet Take 1 tablet by mouth Daily.   7/2/2023   • metFORMIN (GLUCOPHAGE) 1000 MG tablet Take 1 tablet by mouth.   7/2/2023   • multivitamin (THERAGRAN) tablet tablet Take 1 tablet by mouth Daily.   7/2/2023   • sucralfate (CARAFATE) 1 g tablet Take 1 tablet by mouth 4 (Four) Times a Day Before Meals & at Bedtime As Needed.   7/3/2023   • tamsulosin (FLOMAX) 0.4 MG capsule 24 hr capsule Take 1 capsule by mouth Daily.   7/2/2023     Allergies:  Celecoxib, Corticosteroids, and Penicillins     CURRENT MEDICATIONS/OBJECTIVE/VS/PE:     Current Medications:     Current Facility-Administered Medications   Medication Dose Route Frequency Provider Last Rate Last Admin   • albuterol (PROVENTIL) nebulizer solution 0.083% 2.5 mg/3mL  2.5 mg Nebulization Q6H PRN Clifford Cannon MD   2.5 mg at 07/04/23 1109   • aspirin chewable tablet 81 mg  81 mg Oral Daily Clifford Cannon MD   81 mg at 07/05/23 0823   • atorvastatin (LIPITOR) tablet 10 mg  10 mg Oral Daily Clifford Cannon MD   10 mg at 07/05/23 0823   • benzocaine (HURRICAINE) 20 % liquid solution 1 spray  1 spray Mouth/Throat Once Maria G Warner CRNA       • sennosides-docusate (PERICOLACE) 8.6-50 MG per tablet 2 tablet  2 tablet Oral BID Clifford Cannon MD   2 tablet at 07/04/23 2106    And   • polyethylene glycol (MIRALAX) packet 17 g  17 g Oral Daily PRN Clifford Cannon MD        And   • bisacodyl (DULCOLAX) EC tablet 5 mg  5 mg Oral Daily PRN Clifford Cannon MD        And   • bisacodyl  (DULCOLAX) suppository 10 mg  10 mg Rectal Daily PRN Clifford Cannon MD       • dextrose (D50W) (25 g/50 mL) IV injection 25 g  25 g Intravenous Q15 Min PRN Clifford Cannon MD       • dextrose (GLUTOSE) oral gel 15 g  15 g Oral Q15 Min PRN Clifford Cannon MD       • dextrose 5 % and sodium chloride 0.45 % with KCl 20 mEq/L infusion  125 mL/hr Intravenous Continuous Clifford Cannon  mL/hr at 07/05/23 0941 125 mL/hr at 07/05/23 0941   • Enoxaparin Sodium (LOVENOX) syringe 40 mg  40 mg Subcutaneous Daily Clifford Cannon MD   40 mg at 07/05/23 0823   • glucagon HCl (Diagnostic) injection 1 mg  1 mg Intramuscular Q15 Min PRN Clifford Cannon MD       • Insulin Aspart (novoLOG) injection 0-9 Units  0-9 Units Subcutaneous TID AC Clifford Cannon MD       • lactated ringers bolus 1,000 mL  1,000 mL Intravenous Once Clifford Cannon MD       • latanoprost (XALATAN) 0.005 % ophthalmic solution 1 drop  1 drop Both Eyes Nightly Clifford Cannon MD   1 drop at 07/04/23 2106   • morphine injection 2 mg  2 mg Intravenous Q4H PRN Behroozi, Saeid, MD   2 mg at 07/05/23 0824   • morphine injection 4 mg  4 mg Intravenous Q4H PRN Clifford Cannon MD   4 mg at 07/04/23 0859   • multivitamin (THERAGRAN) tablet 1 tablet  1 tablet Oral Daily Clifford Cannon MD   1 tablet at 07/05/23 0823   • ondansetron (ZOFRAN) injection 4 mg  4 mg Intravenous Q6H PRN Clifford Cannon MD       • sodium chloride 0.9 % flush 10 mL  10 mL Intravenous Q12H Clifford Cannon MD   10 mL at 07/04/23 2106   • sodium chloride 0.9 % flush 10 mL  10 mL Intravenous PRN Clifford Cannon MD       • sodium chloride 0.9 % infusion 40 mL  40 mL Intravenous PRN Clifford Cannon MD       • sucralfate (CARAFATE) tablet 1 g  1 g Oral 4x Daily AC & at Bedtime Clifford Cannon MD   1 g at 07/05/23 1105   • tamsulosin (FLOMAX) 24 hr capsule 0.4 mg  0.4 mg Oral Daily Clifford Cannon MD   0.4 mg at 07/05/23 0823       Objective     Review of Systems:   Review of Systems   Constitutional:  Negative for chills, fatigue,  fever and unexpected weight change.   HENT:  Negative for congestion, ear discharge, hearing loss, nosebleeds and sore throat.    Eyes:  Negative for pain, discharge and redness.   Respiratory:  Negative for cough, chest tightness, shortness of breath and wheezing.    Cardiovascular:  Negative for chest pain and palpitations.   Gastrointestinal:  Positive for abdominal pain. Negative for abdominal distention, blood in stool, constipation, diarrhea, nausea and vomiting.   Endocrine: Negative for cold intolerance, polydipsia, polyphagia and polyuria.   Genitourinary:  Negative for dysuria, flank pain, frequency, hematuria and urgency.   Musculoskeletal:  Negative for arthralgias, back pain, joint swelling and myalgias.   Skin:  Negative for color change, pallor and rash.   Neurological:  Negative for tremors, seizures, syncope, weakness and headaches.   Hematological:  Negative for adenopathy. Does not bruise/bleed easily.   Psychiatric/Behavioral:  Negative for behavioral problems, confusion, dysphoric mood, hallucinations and suicidal ideas. The patient is not nervous/anxious.      Physical Exam:   Temp:  [97.5 °F (36.4 °C)-98.9 °F (37.2 °C)] 97.8 °F (36.6 °C)  Heart Rate:  [59-81] 59  Resp:  [18] 18  BP: (138-165)/(73-91) 165/91     Physical Exam:  General Appearance:    Alert, cooperative, in no acute distress   Head:    Normocephalic, without obvious abnormality, atraumatic   Eyes:            Lids and lashes normal, conjunctivae and sclerae normal, no   icterus, no pallor, corneas clear, PERRLA   Ears:    Ears appear intact with no abnormalities noted   Throat:   No oral lesions, no thrush, oral mucosa moist   Neck:   No adenopathy, supple, trachea midline, no thyromegaly, no     carotid bruit, no JVD   Back:     No kyphosis present, no scoliosis present, no skin lesions,       erythema or scars, no tenderness to percussion or                   palpation,   range of motion normal   Lungs:     Clear to  auscultation,respirations regular, even and                   unlabored    Heart:    Regular rhythm and normal rate, normal S1 and S2, no            murmur, no gallop, no rub, no click   Breast Exam:    Deferred   Abdomen:     Normal bowel sounds, no masses, no organomegaly, soft        nontender, nondistended, no guarding, no rebound                 tenderness   Genitalia:    Deferred   Extremities:   Moves all extremities well, no edema, no cyanosis, no              redness   Pulses:   Pulses palpable and equal bilaterally   Skin:   No bleeding, bruising or rash   Lymph nodes:   No palpable adenopathy   Neurologic:   Cranial nerves 2 - 12 grossly intact, sensation intact, DTR        present and equal bilaterally      Results Review:     Lab Results (last 24 hours)       Procedure Component Value Units Date/Time    POC Glucose Once [[id number]]  (Abnormal) Collected: 07/05/23 1035    Specimen: Blood Updated: 07/05/23 1100     Glucose 137 mg/dL      Comment: RN NotifiedOperator: [id number] [employer] HoldingsLIN KATLINMeter ID: [id number]       POC Glucose Once [[id number]]  (Normal) Collected: 07/05/23 0706    Specimen: Blood Updated: 07/05/23 0729     Glucose 127 mg/dL      Comment: RN NotifiedOperator: [id number] CAMPLIN KATLINMeter ID: [id number]       POC Glucose Once [[id number]]  (Abnormal) Collected: 07/04/23 1921    Specimen: Blood Updated: 07/04/23 2002     Glucose 176 mg/dL      Comment: RN NotifiedOperator: [id number] GHASSAN CHRISTINAMeter ID: [id number]       POC Glucose Once [[id number]]  (Normal) Collected: 07/04/23 1630    Specimen: Blood Updated: 07/04/23 1807     Glucose 114 mg/dL      Comment: RN NotifiedOperator: [id number] GordonLIN HEATHERMeter ID: [id number]                I reviewed the patient's new clinical results.  I reviewed the patient's new imaging results and agree with the interpretation.     ASSESSMENT/PLAN:   ASSESSMENT: 1.  Acute gallstone pancreatitis, improving.  MRCP unremarkable for CBD  stone.  2.  Elevated LFTs, improving.  Likely due to gallstone passage or alcoholic hepatitis.  3.  Alcohol usage  4.  GERD    PLAN: 1.  Continue bowel rest, pain management, PPI and antiemetics  2.  Consult surgery for cholecystectomy  3.  Alcohol abstinence  4.  CIWA protocol  The risks, benefits, and alternatives of this procedure have been discussed with the patient or the responsible party- the patient understands and agrees to proceed.         Jhonny King MD  07/05/23  15:41 CDT          Electronically signed by Jhonny King MD at 07/05/23 1543     Zoraida Mena MD at 07/05/23 1418              [facility] Medicine   INPATIENT PROGRESS NOTE      Patient Name: Scott Scanlon  Date of Admission: 7/3/2023  Today's Date: 07/05/23  Length of Stay: 2  Primary Care Physician: Gita Barnard APRN    Subjective   Chief Complaint:   HPI     He is feeling better. Tolerating liquids. No abdominal pain.     Review of Systems   Gastrointestinal: Positive for abdominal pain.      All pertinent negatives and positives are as above. All other systems have been reviewed and are negative unless otherwise stated.     Objective    Temp:  [97.5 °F (36.4 °C)-98.9 °F (37.2 °C)] 97.8 °F (36.6 °C)  Heart Rate:  [59-81] 59  Resp:  [18] 18  BP: (138-165)/(73-91) 165/91  Physical Exam  Reclined in bed, NAD  PERRL, EOMI  Mucosae moist  Breathing stable  Heart rate controlled. Hypertensive  Upper abdomen tenderness is much improved  Moving all limbs  Skin as before  Results Review:  I have reviewed the labs, radiology results, and diagnostic studies.    Laboratory Data:   Results from last 7 days   Lab Units 07/04/23  0520 07/03/23  0835   WBC 10*3/mm3 7.61 8.08   HEMOGLOBIN g/dL 13.6 15.1   HEMATOCRIT % 42.5 46.5   PLATELETS 10*3/mm3 132* 156        Results from last 7 days   Lab Units 07/04/23  0520 07/03/23  0835   SODIUM mmol/L 138 139   POTASSIUM mmol/L 3.8 3.9    CHLORIDE mmol/L 106 108*   CO2 mmol/L 23.0 22.0   BUN mg/dL 11 13   CREATININE mg/dL 0.84 0.81   CALCIUM mg/dL 8.8 9.3   BILIRUBIN mg/dL 1.6* 2.7*   ALK PHOS U/L 222* 294*   ALT (SGPT) U/L 250* 313*   AST (SGOT) U/L 138* 196*   GLUCOSE mg/dL 143* 141*       Culture Data:   No results found for: BLOODCX  No results found for: URINECX  No results found for: RESPCX  No results found for: WOUNDCX  No results found for: STOOLCX  No components found for: BODYFLD    Radiology Data:   Imaging Results (Last 24 Hours)     ** No results found for the last 24 hours. **      I have reviewed the patient's current medications.     Assessment/Plan     Active Hospital Problems    Diagnosis    • **Acute pancreatitis, unspecified complication status, unspecified pancreatitis type Clinically improving. MRCP reviewed. Has gallstones. Will consult general surgery.    • BPH without obstruction/lower urinary tract symptoms Continue flomax   • Gallstone No acute cholecystitis   • Steatosis of liver No acute changes at this time   • Hypercholesterolemia Statin while here       Dispo: general surgery consulted, advancing diet today     Electronically signed by Zoraida Mena MD, 07/05/23, 14:18 CDT.      Electronically signed by Zoraida Mena MD at 07/05/23 [id number]

## 2023-07-08 VITALS
WEIGHT: 248.2 LBS | BODY MASS INDEX: 33.62 KG/M2 | OXYGEN SATURATION: 95 % | TEMPERATURE: 98.4 F | SYSTOLIC BLOOD PRESSURE: 158 MMHG | HEART RATE: 65 BPM | DIASTOLIC BLOOD PRESSURE: 86 MMHG | HEIGHT: 72 IN | RESPIRATION RATE: 18 BRPM

## 2023-07-08 LAB
ANION GAP SERPL CALCULATED.3IONS-SCNC: 13 MMOL/L (ref 5–15)
BASOPHILS # BLD AUTO: 0.03 10*3/MM3 (ref 0–0.2)
BASOPHILS NFR BLD AUTO: 0.3 % (ref 0–1.5)
BUN SERPL-MCNC: 8 MG/DL (ref 8–23)
BUN/CREAT SERPL: 9.6 (ref 7–25)
CALCIUM SPEC-SCNC: 9.3 MG/DL (ref 8.6–10.5)
CHLORIDE SERPL-SCNC: 100 MMOL/L (ref 98–107)
CO2 SERPL-SCNC: 23 MMOL/L (ref 22–29)
CREAT SERPL-MCNC: 0.83 MG/DL (ref 0.76–1.27)
DEPRECATED RDW RBC AUTO: 51 FL (ref 37–54)
EGFRCR SERPLBLD CKD-EPI 2021: 96.5 ML/MIN/1.73
EOSINOPHIL # BLD AUTO: 0.02 10*3/MM3 (ref 0–0.4)
EOSINOPHIL NFR BLD AUTO: 0.2 % (ref 0.3–6.2)
ERYTHROCYTE [DISTWIDTH] IN BLOOD BY AUTOMATED COUNT: 13.8 % (ref 12.3–15.4)
GLUCOSE BLDC GLUCOMTR-MCNC: 111 MG/DL (ref 70–130)
GLUCOSE BLDC GLUCOMTR-MCNC: 116 MG/DL (ref 70–130)
GLUCOSE BLDC GLUCOMTR-MCNC: 136 MG/DL (ref 70–130)
GLUCOSE SERPL-MCNC: 167 MG/DL (ref 65–99)
HCT VFR BLD AUTO: 43.6 % (ref 37.5–51)
HGB BLD-MCNC: 14.1 G/DL (ref 13–17.7)
IMM GRANULOCYTES # BLD AUTO: 0.04 10*3/MM3 (ref 0–0.05)
IMM GRANULOCYTES NFR BLD AUTO: 0.4 % (ref 0–0.5)
LYMPHOCYTES # BLD AUTO: 1.71 10*3/MM3 (ref 0.7–3.1)
LYMPHOCYTES NFR BLD AUTO: 15.9 % (ref 19.6–45.3)
MCH RBC QN AUTO: 31.9 PG (ref 26.6–33)
MCHC RBC AUTO-ENTMCNC: 32.3 G/DL (ref 31.5–35.7)
MCV RBC AUTO: 98.6 FL (ref 79–97)
MONOCYTES # BLD AUTO: 0.77 10*3/MM3 (ref 0.1–0.9)
MONOCYTES NFR BLD AUTO: 7.2 % (ref 5–12)
NEUTROPHILS NFR BLD AUTO: 76 % (ref 42.7–76)
NEUTROPHILS NFR BLD AUTO: 8.19 10*3/MM3 (ref 1.7–7)
NRBC BLD AUTO-RTO: 0 /100 WBC (ref 0–0.2)
PLATELET # BLD AUTO: 224 10*3/MM3 (ref 140–450)
PMV BLD AUTO: 12.3 FL (ref 6–12)
POTASSIUM SERPL-SCNC: 4.6 MMOL/L (ref 3.5–5.2)
QT INTERVAL: 402 MS
QTC INTERVAL: 404 MS
RBC # BLD AUTO: 4.42 10*6/MM3 (ref 4.14–5.8)
SODIUM SERPL-SCNC: 136 MMOL/L (ref 136–145)
WBC NRBC COR # BLD: 10.76 10*3/MM3 (ref 3.4–10.8)

## 2023-07-08 PROCEDURE — 63710000001 INSULIN ASPART PER 5 UNITS: Performed by: STUDENT IN AN ORGANIZED HEALTH CARE EDUCATION/TRAINING PROGRAM

## 2023-07-08 PROCEDURE — 25010000002 MORPHINE PER 10 MG: Performed by: STUDENT IN AN ORGANIZED HEALTH CARE EDUCATION/TRAINING PROGRAM

## 2023-07-08 PROCEDURE — 82948 REAGENT STRIP/BLOOD GLUCOSE: CPT

## 2023-07-08 PROCEDURE — 85025 COMPLETE CBC W/AUTO DIFF WBC: CPT | Performed by: STUDENT IN AN ORGANIZED HEALTH CARE EDUCATION/TRAINING PROGRAM

## 2023-07-08 PROCEDURE — 80048 BASIC METABOLIC PNL TOTAL CA: CPT | Performed by: STUDENT IN AN ORGANIZED HEALTH CARE EDUCATION/TRAINING PROGRAM

## 2023-07-08 RX ORDER — ATORVASTATIN CALCIUM 10 MG/1
10 TABLET, FILM COATED ORAL DAILY
Qty: 90 TABLET | Refills: 0 | Status: SHIPPED | OUTPATIENT
Start: 2023-07-09

## 2023-07-08 RX ADMIN — DOCUSATE SODIUM 50 MG AND SENNOSIDES 8.6 MG 2 TABLET: 8.6; 5 TABLET, FILM COATED ORAL at 08:05

## 2023-07-08 RX ADMIN — SUCRALFATE 1 G: 1 TABLET ORAL at 08:05

## 2023-07-08 RX ADMIN — ATORVASTATIN CALCIUM 10 MG: 10 TABLET, FILM COATED ORAL at 08:05

## 2023-07-08 RX ADMIN — Medication 2 SPRAY: at 08:05

## 2023-07-08 RX ADMIN — TAMSULOSIN HYDROCHLORIDE 0.4 MG: 0.4 CAPSULE ORAL at 08:05

## 2023-07-08 RX ADMIN — MORPHINE SULFATE 2 MG: 2 INJECTION, SOLUTION INTRAMUSCULAR; INTRAVENOUS at 03:28

## 2023-07-08 RX ADMIN — INSULIN ASPART 2 UNITS: 100 INJECTION, SOLUTION INTRAVENOUS; SUBCUTANEOUS at 08:05

## 2023-07-08 RX ADMIN — MORPHINE SULFATE 4 MG: 4 INJECTION, SOLUTION INTRAMUSCULAR; INTRAVENOUS at 08:06

## 2023-07-08 RX ADMIN — THERA TABS 1 TABLET: TAB at 08:05

## 2023-07-08 RX ADMIN — ASPIRIN 81 MG: 81 TABLET, CHEWABLE ORAL at 08:04

## 2023-07-08 NOTE — PROGRESS NOTES
SUBJECTIVE:   7/7/2023  Chief Complaint:     Subjective      Patient is feeling better today.  Abdominal pain has improved.  Underwent cholecystectomy today.  WBC and hemoglobin normal.    History:  Past Medical History:   Diagnosis Date   • BPH (benign prostatic hyperplasia)    • Diabetes mellitus    • Hypercholesterolemia    • Hyperlipidemia      Past Surgical History:   Procedure Laterality Date   • COLONOSCOPY N/A 1/10/2022   • COLONOSCOPY N/A 1/4/2023    Procedure: COLONOSCOPY;  Surgeon: Jhonny King MD;  Location: [facility] ENDOSCOPY;  Service: Gastroenterology;  Laterality: N/A;   • ENDOSCOPY N/A 1/4/2023    Procedure: ESOPHAGOGASTRODUODENOSCOPY;  Surgeon: Jhonny King MD;  Location: [facility] ENDOSCOPY;  Service: Gastroenterology;  Laterality: N/A;   • ENDOSCOPY N/A 3/10/2023    Procedure: ESOPHAGOGASTRODUODENOSCOPY;  Surgeon: Jhonny King MD;  Location: [facility] ENDOSCOPY;  Service: Gastroenterology;  Laterality: N/A;   • JOINT REPLACEMENT     • KNEE ARTHROSCOPY Left    • TOTAL KNEE ARTHROPLASTY Right      Family History   Problem Relation Age of Onset   • Hypertension Mother    • Hypertension Father      Social History     Tobacco Use   • Smoking status: Some Days     Years: 30.00     Types: Cigarettes   • Smokeless tobacco: Never   Vaping Use   • Vaping Use: Never used   Substance Use Topics   • Alcohol use: Yes     Comment: occ beer   • Drug use: Never     Medications Prior to Admission   Medication Sig Dispense Refill Last Dose   • albuterol sulfate  (90 Base) MCG/ACT inhaler Inhale 2 puffs.   7/3/2023   • aspirin 81 MG chewable tablet Chew 1 tablet Daily.   Past Week   • cholecalciferol (VITAMIN D3) 1.25 MG (70283 UT) capsule Take 1 capsule by mouth 1 (One) Time Per Week.   7/2/2023   • latanoprost (XALATAN) 0.005 % ophthalmic solution Administer 1 drop to both eyes Every Night.      • lovastatin (MEVACOR) 40 MG tablet Take 1 tablet by mouth every night at bedtime.   7/2/2023    • meloxicam (MOBIC) 7.5 MG tablet Take 1 tablet by mouth Daily.   7/2/2023   • metFORMIN (GLUCOPHAGE) 1000 MG tablet Take 1 tablet by mouth.   7/2/2023   • multivitamin (THERAGRAN) tablet tablet Take 1 tablet by mouth Daily.   7/2/2023   • sucralfate (CARAFATE) 1 g tablet Take 1 tablet by mouth 4 (Four) Times a Day Before Meals & at Bedtime As Needed.   7/3/2023   • tamsulosin (FLOMAX) 0.4 MG capsule 24 hr capsule Take 1 capsule by mouth Daily.   7/2/2023     Allergies:  Celecoxib, Corticosteroids, and Penicillins     CURRENT MEDICATIONS/OBJECTIVE/VS/PE:     Current Medications:     Current Facility-Administered Medications   Medication Dose Route Frequency Provider Last Rate Last Admin   • albuterol (PROVENTIL) nebulizer solution 0.083% 2.5 mg/3mL  2.5 mg Nebulization Q6H PRN Raymon Dennison MD   2.5 mg at 07/04/23 1109   • aspirin chewable tablet 81 mg  81 mg Oral Daily Raymon Dennison MD   81 mg at 07/05/23 0823   • atorvastatin (LIPITOR) tablet 10 mg  10 mg Oral Daily Raymon Dennison MD   10 mg at 07/07/23 0834   • benzocaine (HURRICAINE) 20 % liquid solution 1 spray  1 spray Mouth/Throat Once Raymon Dennison MD       • sennosides-docusate (PERICOLACE) 8.6-50 MG per tablet 2 tablet  2 tablet Oral BID Raymon Dennison MD   2 tablet at 07/06/23 2030    And   • polyethylene glycol (MIRALAX) packet 17 g  17 g Oral Daily PRN Raymon Dennison MD        And   • bisacodyl (DULCOLAX) EC tablet 5 mg  5 mg Oral Daily PRN Raymon Dennison MD        And   • bisacodyl (DULCOLAX) suppository 10 mg  10 mg Rectal Daily PRN Raymon Dennison MD       • cyclobenzaprine (FLEXERIL) tablet 5 mg  5 mg Oral TID PRN Raymon Dennison MD       • dextrose (D50W) (25 g/50 mL) IV injection 25 g  25 g Intravenous Q15 Min PRN Raymon Dennison MD       • dextrose (GLUTOSE) oral gel 15 g  15 g Oral Q15 Min PRN Raymon Dennison MD       • dextrose 5 % and sodium chloride 0.45 % with KCl 20 mEq/L infusion  125 mL/hr Intravenous Continuous  Raymon Dennison  mL/hr at 07/07/23 2109 125 mL/hr at 07/07/23 2109   • glucagon HCl (Diagnostic) injection 1 mg  1 mg Intramuscular Q15 Min PRN Raymon Dennison MD       • Insulin Aspart (novoLOG) injection 0-9 Units  0-9 Units Subcutaneous TID AC Raymon Dennison MD   2 Units at 07/07/23 0833   • lactated ringers bolus 1,000 mL  1,000 mL Intravenous Once Raymon Dennison MD       • latanoprost (XALATAN) 0.005 % ophthalmic solution 1 drop  1 drop Both Eyes Nightly Raymon Dennison MD   1 drop at 07/06/23 2030   • morphine injection 2 mg  2 mg Intravenous Q4H PRN Raymon Dennison MD   2 mg at 07/05/23 0824   • morphine injection 4 mg  4 mg Intravenous Q4H PRN Raymon Dennison MD   4 mg at 07/04/23 0859   • multivitamin (THERAGRAN) tablet 1 tablet  1 tablet Oral Daily Raymon Dennison MD   1 tablet at 07/07/23 0834   • ondansetron (ZOFRAN) injection 4 mg  4 mg Intravenous Q6H PRN Raymon Dennison MD       • [MAR Hold] oxymetazoline (AFRIN) nasal spray 2 spray  2 spray Each Nare BID Amelia Ying MD   2 spray at 07/07/23 0935   • [MAR Hold] oxymetazoline (AFRIN) nasal spray 2 spray  2 spray Each Nare PRN Amelia Ying MD       • sodium chloride 0.9 % flush 10 mL  10 mL Intravenous Q12H Raymon Dennison MD   10 mL at 07/06/23 2032   • sodium chloride 0.9 % flush 10 mL  10 mL Intravenous PRN Raymon Dennison MD       • sodium chloride 0.9 % infusion 40 mL  40 mL Intravenous PRN Raymon Dennison MD       • sodium chloride 0.9 % infusion  50 mL/hr Intravenous Continuous Raymon Dennison MD 50 mL/hr at 07/07/23 1449 Restarted at 07/07/23 1501   • sodium chloride nasal spray 2 spray  2 spray Each Nare Q2H While Awake Raymon Dennison MD   2 spray at 07/07/23 1245   • sucralfate (CARAFATE) tablet 1 g  1 g Oral 4x Daily AC & at Bedtime Raymon Dennison MD   1 g at 07/07/23 0834   • tamsulosin (FLOMAX) 24 hr capsule 0.4 mg  0.4 mg Oral Daily Raymon Dennison MD   0.4 mg at 07/07/23 0931       Objective     Review of  Systems:   Review of Systems    Physical Exam:   Temp:  [97.4 °F (36.3 °C)-98.2 °F (36.8 °C)] 97.4 °F (36.3 °C)  Heart Rate:  [53-95] 95  Resp:  [14-18] 18  BP: (128-158)/(70-87) 137/84     Physical Exam:  General Appearance:    Alert, cooperative, in no acute distress   Head:    Normocephalic, without obvious abnormality, atraumatic   Eyes:            Lids and lashes normal, conjunctivae and sclerae normal, no   icterus, no pallor, corneas clear, PERRLA   Ears:    Ears appear intact with no abnormalities noted   Throat:   No oral lesions, no thrush, oral mucosa moist   Neck:   No adenopathy, supple, trachea midline, no thyromegaly, no     carotid bruit, no JVD   Back:     No kyphosis present, no scoliosis present, no skin lesions,       erythema or scars, no tenderness to percussion or                   palpation,   range of motion normal   Lungs:     Clear to auscultation,respirations regular, even and                   unlabored    Heart:    Regular rhythm and normal rate, normal S1 and S2, no            murmur, no gallop, no rub, no click   Breast Exam:    Deferred   Abdomen:     Normal bowel sounds, no masses, no organomegaly, soft        nontender, nondistended, no guarding, no rebound                 tenderness   Genitalia:    Deferred   Extremities:   Moves all extremities well, no edema, no cyanosis, no              redness   Pulses:   Pulses palpable and equal bilaterally   Skin:   No bleeding, bruising or rash   Lymph nodes:   No palpable adenopathy   Neurologic:   Cranial nerves 2 - 12 grossly intact, sensation intact, DTR        present and equal bilaterally      Results Review:     Lab Results (last 24 hours)     Procedure Component Value Units Date/Time    POC Glucose Once [[id number]]  (Abnormal) Collected: 07/07/23 1927    Specimen: Blood Updated: 07/07/23 2007     Glucose 215 mg/dL      Comment: RN NotifiedOperator: [id number] TIMOTHY OPALMeter ID: [id number]       POC Glucose Once [[id number]]   (Abnormal) Collected: 07/07/23 1603    Specimen: Blood Updated: 07/07/23 1738     Glucose 134 mg/dL      Comment: RN NotifiedOperator: [id number] HOUSTON Rothman ID: [id number]       Folate [[id number]]  (Normal) Collected: 07/07/23 0603    Specimen: Blood Updated: 07/07/23 1311     Folate 18.20 ng/mL     Narrative:      Results may be falsely increased if patient taking Biotin.      Vitamin B12 [[id number]]  (Abnormal) Collected: 07/07/23 0603    Specimen: Blood Updated: 07/07/23 1311     Vitamin B-12 1,362 pg/mL     Narrative:      Results may be falsely increased if patient taking Biotin.      Basic Metabolic Panel [[id number]]  (Abnormal) Collected: 07/07/23 0902    Specimen: Blood Updated: 07/07/23 0947     Glucose 155 mg/dL      BUN 7 mg/dL      Creatinine 0.78 mg/dL      Sodium 138 mmol/L      Potassium 4.1 mmol/L      Chloride 103 mmol/L      CO2 25.0 mmol/L      Calcium 9.0 mg/dL      BUN/Creatinine Ratio 9.0     Anion Gap 10.0 mmol/L      eGFR 98.4 mL/min/1.73     Narrative:      GFR Normal >60  Chronic Kidney Disease <60  Kidney Failure <15      CBC & Differential [[id number]]  (Abnormal) Collected: 07/07/23 0902    Specimen: Blood Updated: 07/07/23 0924    Narrative:      The following orders were created for panel order CBC & Differential.  Procedure                               Abnormality         Status                     ---------                               -----------         ------                     CBC Auto Differential[[id number]]        Abnormal            Final result                 Please view results for these tests on the individual orders.    CBC Auto Differential [[id number]]  (Abnormal) Collected: 07/07/23 0902    Specimen: Blood Updated: 07/07/23 0924     WBC 6.98 10*3/mm3      RBC 4.22 10*6/mm3      Hemoglobin 13.6 g/dL      Hematocrit 41.5 %      MCV 98.3 fL      MCH 32.2 pg      MCHC 32.8 g/dL      RDW 14.3 %      RDW-SD 52.0 fl      MPV 12.3 fL      Platelets 182 10*3/mm3       Neutrophil % 60.9 %      Lymphocyte % 25.8 %      Monocyte % 9.5 %      Eosinophil % 2.7 %      Basophil % 0.7 %      Immature Grans % 0.4 %      Neutrophils, Absolute 4.25 10*3/mm3      Lymphocytes, Absolute 1.80 10*3/mm3      Monocytes, Absolute 0.66 10*3/mm3      Eosinophils, Absolute 0.19 10*3/mm3      Basophils, Absolute 0.05 10*3/mm3      Immature Grans, Absolute 0.03 10*3/mm3      nRBC 0.0 /100 WBC     POC Glucose Once [[id number]]  (Abnormal) Collected: 07/07/23 0718    Specimen: Blood Updated: 07/07/23 0734     Glucose 167 mg/dL      Comment: RN NotifiedOperator: [id number] MANJIT SIMMONSMeter ID: [id number]              I reviewed the patient's new clinical results.  I reviewed the patient's new imaging results and agree with the interpretation.     ASSESSMENT/PLAN:   ASSESSMENT: 1.  Acute gallstone pancreatitis, resolved.  2.  Gallstones, status postcholecystectomy    PLAN: 1.  Continue current supportive care  2.  Diet per surgery  3.  Okay to DC in a.m. from GI and GI standpoint if he continues to improve  The risks, benefits, and alternatives of this procedure have been discussed with the patient or the responsible party- the patient understands and agrees to proceed.         Jhonny King MD  07/07/23  21:31 CDT

## 2023-07-08 NOTE — PROGRESS NOTES
[facility]     Progress Note    Patient Name: Scott cSanlon  : 1955  MRN: [id number]  Primary Care Physician:  Gita Barnard APRN  Date of admission: 7/3/2023    Subjective   Subjective     Chief Complaint: POD1    History of Present Illness  Patient Reports Doing well    Review of Systems   Gastrointestinal:  Positive for abdominal pain. Negative for abdominal distention.     Objective   Objective     Vitals:   Temp:  [97.4 °F (36.3 °C)-98.4 °F (36.9 °C)] 98 °F (36.7 °C)  Heart Rate:  [53-95] 63  Resp:  [14-18] 18  BP: (128-162)/(71-87) 162/79  Flow (L/min):  [2] 2    Physical Exam  Cardiovascular:      Rate and Rhythm: Normal rate and regular rhythm.   Abdominal:      General: Abdomen is flat. There is no distension.      Palpations: Abdomen is soft. There is no mass.      Tenderness: There is abdominal tenderness (incisional tenderness). There is no guarding or rebound.      Hernia: No hernia is present.        Result Review    Result Review:  I have personally reviewed the results from the time of this admission to 2023 09:48 CDT and agree with these findings:  [x]  Laboratory list / accordion  []  Microbiology  []  Radiology  []  EKG/Telemetry   []  Cardiology/Vascular   []  Pathology  []  Old records  []  Other:  Most notable findings include:   23  Basic Metabolic Panel  Collected: 23  Final result  Specimen: Blood    Glucose 167 High  mg/dL CO2 23.0 mmol/L   BUN 8 mg/dL Calcium 9.3 mg/dL   Creatinine 0.83 mg/dL BUN/Creatinine Ratio 9.6   Sodium 136 mmol/L Anion Gap 13.0 mmol/L   Potassium 4.6 mmol/L  eGFR 96.5 mL/min/1.73   Chloride 100 mmol/L            23  CBC & Differential  Collected: 23  Final result  Specimen: Blood           23  CBC Auto Differential  Collected: 23  Final result  Specimen: Blood    WBC 10.76 10*3/mm3 Lymphocyte % 15.9 Low  %   RBC 4.42 10*6/mm3 Monocyte % 7.2 %   Hemoglobin 14.1 g/dL  Eosinophil % 0.2 Low  %   Hematocrit 43.6 % Basophil % 0.3 %   MCV 98.6 High  fL Immature Grans % 0.4 %   MCH 31.9 pg Neutrophils, Absolute 8.19 High  10*3/mm3   MCHC 32.3 g/dL Lymphocytes, Absolute 1.71 10*3/mm3   RDW 13.8 % Monocytes, Absolute 0.77 10*3/mm3   RDW-SD 51.0 fl Eosinophils, Absolute 0.02 10*3/mm3   MPV 12.3 High  fL Basophils, Absolute 0.03 10*3/mm3   Platelets 224 10*3/mm3 Immature Grans, Absolute 0.04 10*3/mm3   Neutrophil % 76.0 % nRBC 0.0 /100 WBC          07/08/23 0452  POC Glucose Once  Collected: 07/07/23 1656  Final result  Specimen: Blood    Glucose 111 mg/dL             07/08/23 0452  POC Glucose Once  Collected: 07/07/23 1039  Final result  Specimen: Blood    Glucose 116 mg/dL             Assessment & Plan   Assessment / Plan     Brief Patient Summary:  Scott Scanlon is a 67 y.o. male who s/p laparoscopic cholecystectomy.      Active Hospital Problems:  Active Hospital Problems    Diagnosis     **Acute pancreatitis, unspecified complication status, unspecified pancreatitis type     BPH without obstruction/lower urinary tract symptoms     Gallstone     Steatosis of liver     Hypercholesterolemia      Plan:   Okay for discharge    DVT prophylaxis:  No DVT prophylaxis order currently exists.    CODE STATUS:    Code Status (Patient has no pulse and is not breathing): CPR (Attempt to Resuscitate)  Medical Interventions (Patient has pulse or is breathing): Full Support          This document has been electronically signed by Malachi Rodríguez MD on July 8, 2023 09:51 CDT       Malachi Rodríguez MD

## 2023-07-08 NOTE — DISCHARGE SUMMARY
[facility] Medicine Services  DISCHARGE SUMMARY       Date of Admission: 7/3/2023  Date of Discharge:  7/8/2023  Primary Care Physician: Gita Barnard APRN    Presenting Problem/History of Present Illness:  Calculus of gallbladder with biliary obstruction but without cholecystitis [K80.21]  Acute pancreatitis, unspecified complication status, unspecified pancreatitis type [K85.90]       Final Discharge Diagnoses:  Active Hospital Problems    Diagnosis    • **Acute pancreatitis, unspecified complication status, unspecified pancreatitis type    • BPH without obstruction/lower urinary tract symptoms    • Gallstone    • Steatosis of liver    • Hypercholesterolemia        Consults:   Consults     Date and Time Order Name Status Description    7/6/2023  9:31 AM Inpatient ENT Consult Completed     7/5/2023  2:25 PM Inpatient General Surgery Consult Completed     7/5/2023 11:08 AM Inpatient General Surgery Consult      7/3/2023 10:37 AM Gastroenterology (on-call MD unless specified) Completed           Procedures Performed: Procedure(s):  CHOLECYSTECTOMY LAPAROSCOPIC WITH DAVINCI ROBOT                Pertinent Test Results:   Lab Results (most recent)       Procedure Component Value Units Date/Time    Basic Metabolic Panel [[id number]]  (Abnormal) Collected: 07/08/23 0520    Specimen: Blood Updated: 07/08/23 0650     Glucose 167 mg/dL      BUN 8 mg/dL      Creatinine 0.83 mg/dL      Sodium 136 mmol/L      Potassium 4.6 mmol/L      Comment: Slight hemolysis detected by analyzer. Results may be affected.        Chloride 100 mmol/L      CO2 23.0 mmol/L      Calcium 9.3 mg/dL      BUN/Creatinine Ratio 9.6     Anion Gap 13.0 mmol/L      eGFR 96.5 mL/min/1.73     Narrative:      GFR Normal >60  Chronic Kidney Disease <60  Kidney Failure <15      CBC & Differential [[id number]]  (Abnormal) Collected: 07/08/23 0520    Specimen: Blood Updated: 07/08/23 0615    Narrative:      The  following orders were created for panel order CBC & Differential.  Procedure                               Abnormality         Status                     ---------                               -----------         ------                     CBC Auto Differential[[id number]]        Abnormal            Final result                 Please view results for these tests on the individual orders.    CBC Auto Differential [[id number]]  (Abnormal) Collected: 07/08/23 0520    Specimen: Blood Updated: 07/08/23 0615     WBC 10.76 10*3/mm3      RBC 4.42 10*6/mm3      Hemoglobin 14.1 g/dL      Hematocrit 43.6 %      MCV 98.6 fL      MCH 31.9 pg      MCHC 32.3 g/dL      RDW 13.8 %      RDW-SD 51.0 fl      MPV 12.3 fL      Platelets 224 10*3/mm3      Neutrophil % 76.0 %      Lymphocyte % 15.9 %      Monocyte % 7.2 %      Eosinophil % 0.2 %      Basophil % 0.3 %      Immature Grans % 0.4 %      Neutrophils, Absolute 8.19 10*3/mm3      Lymphocytes, Absolute 1.71 10*3/mm3      Monocytes, Absolute 0.77 10*3/mm3      Eosinophils, Absolute 0.02 10*3/mm3      Basophils, Absolute 0.03 10*3/mm3      Immature Grans, Absolute 0.04 10*3/mm3      nRBC 0.0 /100 WBC     POC Glucose Once [[id number]]  (Normal) Collected: 07/07/23 1656    Specimen: Blood Updated: 07/08/23 0452     Glucose 111 mg/dL      Comment: RN NotifiedOperator: [id number] MANJIT KATLINMeter ID: [id number]       POC Glucose Once [[id number]]  (Normal) Collected: 07/07/23 1039    Specimen: Blood Updated: 07/08/23 0452     Glucose 116 mg/dL      Comment: RN NotifiedOperator: [id number] MANJIT KATLINMeter ID: [id number]       Folate [[id number]]  (Normal) Collected: 07/07/23 0603    Specimen: Blood Updated: 07/07/23 1311     Folate 18.20 ng/mL     Narrative:      Results may be falsely increased if patient taking Biotin.      Vitamin B12 [[id number]]  (Abnormal) Collected: 07/07/23 0603    Specimen: Blood Updated: 07/07/23 1311     Vitamin B-12 1,362 pg/mL     Narrative:       Results may be falsely increased if patient taking Biotin.      Basic Metabolic Panel [[id number]]  (Abnormal) Collected: 07/07/23 0902    Specimen: Blood Updated: 07/07/23 0947     Glucose 155 mg/dL      BUN 7 mg/dL      Creatinine 0.78 mg/dL      Sodium 138 mmol/L      Potassium 4.1 mmol/L      Chloride 103 mmol/L      CO2 25.0 mmol/L      Calcium 9.0 mg/dL      BUN/Creatinine Ratio 9.0     Anion Gap 10.0 mmol/L      eGFR 98.4 mL/min/1.73     Narrative:      GFR Normal >60  Chronic Kidney Disease <60  Kidney Failure <15      CBC & Differential [[id number]]  (Abnormal) Collected: 07/07/23 0902    Specimen: Blood Updated: 07/07/23 0924    Narrative:      The following orders were created for panel order CBC & Differential.  Procedure                               Abnormality         Status                     ---------                               -----------         ------                     CBC Auto Differential[[id number]]        Abnormal            Final result                 Please view results for these tests on the individual orders.    CBC Auto Differential [[id number]]  (Abnormal) Collected: 07/07/23 0902    Specimen: Blood Updated: 07/07/23 0924     WBC 6.98 10*3/mm3      RBC 4.22 10*6/mm3      Hemoglobin 13.6 g/dL      Hematocrit 41.5 %      MCV 98.3 fL      MCH 32.2 pg      MCHC 32.8 g/dL      RDW 14.3 %      RDW-SD 52.0 fl      MPV 12.3 fL      Platelets 182 10*3/mm3      Neutrophil % 60.9 %      Lymphocyte % 25.8 %      Monocyte % 9.5 %      Eosinophil % 2.7 %      Basophil % 0.7 %      Immature Grans % 0.4 %      Neutrophils, Absolute 4.25 10*3/mm3      Lymphocytes, Absolute 1.80 10*3/mm3      Monocytes, Absolute 0.66 10*3/mm3      Eosinophils, Absolute 0.19 10*3/mm3      Basophils, Absolute 0.05 10*3/mm3      Immature Grans, Absolute 0.03 10*3/mm3      nRBC 0.0 /100 WBC     Extra Tubes [[id number]] Collected: 07/06/23 0944    Specimen: Blood, Venous Line Updated: 07/06/23 7577    Narrative:      The  following orders were created for panel order Extra Tubes.  Procedure                               Abnormality         Status                     ---------                               -----------         ------                     Lavender Top[[id number]]                                     Final result                 Please view results for these tests on the individual orders.    Lavender Top [[id number]] Collected: 07/06/23 0944    Specimen: Blood Updated: 07/06/23 1045     Extra Tube hold for add-on     Comment: Auto resulted       Magnesium [[id number]]  (Normal) Collected: 07/06/23 0939    Specimen: Blood Updated: 07/06/23 1007     Magnesium 2.1 mg/dL     Lipase [[id number]]  (Abnormal) Collected: 07/04/23 0520    Specimen: Blood Updated: 07/04/23 0624     Lipase 657 U/L     Magnesium [[id number]]  (Normal) Collected: 07/04/23 0520    Specimen: Blood Updated: 07/04/23 0617     Magnesium 2.2 mg/dL     Comprehensive Metabolic Panel [[id number]]  (Abnormal) Collected: 07/04/23 0520    Specimen: Blood Updated: 07/04/23 0617     Glucose 143 mg/dL      BUN 11 mg/dL      Creatinine 0.84 mg/dL      Sodium 138 mmol/L      Potassium 3.8 mmol/L      Chloride 106 mmol/L      CO2 23.0 mmol/L      Calcium 8.8 mg/dL      Total Protein 6.1 g/dL      Albumin 3.3 g/dL      ALT (SGPT) 250 U/L      AST (SGOT) 138 U/L      Alkaline Phosphatase 222 U/L      Total Bilirubin 1.6 mg/dL      Globulin 2.8 gm/dL      A/G Ratio 1.2 g/dL      BUN/Creatinine Ratio 13.1     Anion Gap 9.0 mmol/L      eGFR 96.2 mL/min/1.73     Narrative:      GFR Normal >60  Chronic Kidney Disease <60  Kidney Failure <15      Amylase [[id number]]  (Abnormal) Collected: 07/04/23 0520    Specimen: Blood Updated: 07/04/23 0617     Amylase 492 U/L     Phosphorus [[id number]]  (Normal) Collected: 07/04/23 0520    Specimen: Blood Updated: 07/04/23 0617     Phosphorus 3.1 mg/dL     Extra Tubes [[id number]] Collected: 07/03/23 0835    Specimen: Blood, Venous Line Updated:  07/03/23 1615    Narrative:      The following orders were created for panel order Extra Tubes.  Procedure                               Abnormality         Status                     ---------                               -----------         ------                     Light Blue Top[[id number]]                                   Final result                 Please view results for these tests on the individual orders.    Light Blue Top [[id number]] Collected: 07/03/23 0835    Specimen: Blood Updated: 07/03/23 1615     Extra Tube Hold for add-ons.     Comment: Auto resulted       Gold Top - SST [[id number]] Collected: 07/03/23 0835    Specimen: Blood Updated: 07/03/23 1615     Extra Tube Hold for add-ons.     Comment: Auto resulted.       High Sensitivity Troponin T 2Hr [[id number]]  (Normal) Collected: 07/03/23 1044    Specimen: Blood Updated: 07/03/23 1111     HS Troponin T 7 ng/L      Troponin T Delta 0 ng/L     Narrative:      High Sensitive Troponin T Reference Range:  <10.0 ng/L- Negative Female for AMI  <15.0 ng/L- Negative Male for AMI  >=10 - Abnormal Female indicating possible myocardial injury.  >=15 - Abnormal Male indicating possible myocardial injury.   Clinicians would have to utilize clinical acumen, EKG, Troponin, and serial changes to determine if it is an Acute Myocardial Infarction or myocardial injury due to an underlying chronic condition.         Urinalysis With Culture If Indicated - Urine, Clean Catch [[id number]]  (Abnormal) Collected: 07/03/23 0943    Specimen: Urine, Clean Catch Updated: 07/03/23 1003     Color, UA Yellow     Appearance, UA Clear     pH, UA <=5.0     Specific Gravity, UA 1.026     Glucose, UA Negative     Ketones, UA Negative     Bilirubin, UA Negative     Blood, UA Small (1+)     Protein, UA Negative     Leuk Esterase, UA Negative     Nitrite, UA Negative     Urobilinogen, UA 1.0 E.U./dL    Narrative:      In absence of clinical symptoms, the presence of pyuria, bacteria,  and/or nitrites on the urinalysis result does not correlate with infection.    Urinalysis, Microscopic Only - Urine, Clean Catch [[id number]]  (Abnormal) Collected: 07/03/23 0943    Specimen: Urine, Clean Catch Updated: 07/03/23 1003     RBC, UA 0-2 /HPF      WBC, UA 0-2 /HPF      Comment: Urine culture not indicated.        Bacteria, UA None Seen /HPF      Squamous Epithelial Cells, UA 0-2 /HPF      Hyaline Casts, UA None Seen /LPF      Methodology Automated Microscopy    Lipase [[id number]]  (Abnormal) Collected: 07/03/23 0835    Specimen: Blood Updated: 07/03/23 0912     Lipase >3,000 U/L     Comprehensive Metabolic Panel [[id number]]  (Abnormal) Collected: 07/03/23 0835    Specimen: Blood Updated: 07/03/23 0904     Glucose 141 mg/dL      BUN 13 mg/dL      Creatinine 0.81 mg/dL      Sodium 139 mmol/L      Potassium 3.9 mmol/L      Chloride 108 mmol/L      CO2 22.0 mmol/L      Calcium 9.3 mg/dL      Total Protein 7.7 g/dL      Albumin 3.9 g/dL      ALT (SGPT) 313 U/L      AST (SGOT) 196 U/L      Alkaline Phosphatase 294 U/L      Total Bilirubin 2.7 mg/dL      Globulin 3.8 gm/dL      A/G Ratio 1.0 g/dL      BUN/Creatinine Ratio 16.0     Anion Gap 9.0 mmol/L      eGFR 97.2 mL/min/1.73     Narrative:      GFR Normal >60  Chronic Kidney Disease <60  Kidney Failure <15      High Sensitivity Troponin T [[id number]]  (Normal) Collected: 07/03/23 0835    Specimen: Blood Updated: 07/03/23 0904     HS Troponin T 7 ng/L     Narrative:      High Sensitive Troponin T Reference Range:  <10.0 ng/L- Negative Female for AMI  <15.0 ng/L- Negative Male for AMI  >=10 - Abnormal Female indicating possible myocardial injury.  >=15 - Abnormal Male indicating possible myocardial injury.   Clinicians would have to utilize clinical acumen, EKG, Troponin, and serial changes to determine if it is an Acute Myocardial Infarction or myocardial injury due to an underlying chronic condition.         BNP [[id number]]  (Normal) Collected: 07/03/23 0835     Specimen: Blood Updated: 07/03/23 0902     proBNP 61.6 pg/mL     Narrative:      Among patients with dyspnea, NT-proBNP is highly sensitive for the detection of acute congestive heart failure. In addition NT-proBNP of <300 pg/ml effectively rules out acute congestive heart failure with 99% negative predictive value.      Lactic Acid, Plasma [[id number]]  (Normal) Collected: 07/03/23 0835    Specimen: Blood Updated: 07/03/23 0900     Lactate 0.9 mmol/L           Imaging Results (Most Recent)     Procedure Component Value Units Date/Time    MRI abdomen wo contrast mrcp [[id number]] Collected: 07/03/23 1523     Updated: 07/03/23 1559    Narrative:        HISTORY:  Abdominal pain and vomiting.    COMPARISON:  CT abdomen and pelvis 7/3/2023    TECHNIQUE:  Multiplanar, multisequence imaging through the abdomen without administration of  intravenous gadolinium.  3-D imaging of the biliary tree.    FINDINGS:  Gallstones are present.  No choledocholithiasis or biliary dilatation.  Pancreatic duct also appears normal in caliber.  There is edema about the  pancreas.  This extends toward the left and right paracolic gutters.    Background fatty infiltration of the liver but no focal lesion.  Spleen is  normal in size.  The visualized kidneys appear normal.    IMPRESSIONS:  1.  Moderate acute pancreatitis.    2.  Gallstones.  There is gallbladder distention but no evidence of  choledocholithiasis or biliary dilatation.    CT Abdomen Pelvis With Contrast [[id number]] Collected: 07/03/23 0925     Updated: 07/03/23 1025    Narrative:      History:  Abdominal pain.    comparison:  none.    TECHNIQUE:  Intravenous contrast was administered and axial images from the level of the  diaphragms through the pelvis were performed followed by 2D multiplanar  reformats.    FINDINGS:  The lung bases are clear. There is cholelithiasis. The liver, spleen, kidneys  and adrenals are normal. There is peripancreatic inflammatory change  "consistent  with acute pancreatitis. There are no dilated loops of bowel.      Impression:      1. Peripancreatic inflammatory change involving the pancreatic tail suggesting  acute pancreatitis.    2. Cholelithiasis.          Chief Complaint on Day of Discharge: None    Hospital Course:  The patient is a 67 y.o. male who presented to [facility] with chief complaint of abdominal pain, nausea, vomiting abdomen pelvis showed acute pancreatitis and cholelithiasis.  Patient does admit to drinking alcohol.  MRCP showed no CBD pathology.  His pancreatitis pain improved significantly and he underwent laparoscopic cholecystectomy with Dr. Dennison.  He tolerated this procedure well.  On day of discharge he is ready to go home, feeling much better.  Will follow closely with surgery outpatient.    Condition on Discharge: Stable, improved    Physical Exam on Discharge:  /86 (BP Location: Left arm, Patient Position: Lying)   Pulse 65   Temp 98.4 °F (36.9 °C) (Temporal)   Resp 18   Ht 182.9 cm (72\")   Wt 113 kg (248 lb 3.2 oz)   SpO2 95%   BMI 33.66 kg/m²     Vitals and nursing note reviewed.   Constitutional:       General: No acute distress.     Appearance: Normal appearance.   HENT:      Head: Normocephalic and atraumatic.      Right Ear: External ear normal.      Left Ear: External ear normal.      Nose: Nose normal.      Mouth/Throat:      Mouth: Mucous membranes are moist.   Eyes:      Conjunctivae/sclerae: Conjunctivae normal.      Pupils: Pupils are equal, round, and reactive to light.   Cardiovascular:      Rate and Rhythm: Normal rate and regular rhythm.   Pulmonary:      Effort: Pulmonary effort is normal.      Breath sounds: Normal breath sounds.   Abdominal:      General: Abdomen is flat.      Palpations: Abdomen is soft.      Tenderness: There is no abdominal tenderness.   Genitourinary:   Musculoskeletal:         General: No signs of injury. Normal range of motion.      Cervical back: No " tenderness.   Skin:     General: Skin is warm and dry.   Neurological:      General: No focal deficit present.      Mental Status: Alert and oriented to person, place, and time.   Psychiatric:         Mood and Affect: Mood normal.         Behavior: Behavior normal.         Discharge Medications:     Discharge Medications      New Medications      Instructions Start Date   atorvastatin 10 MG tablet  Commonly known as: LIPITOR  Replaces: lovastatin 40 MG tablet   10 mg, Oral, Daily   Start Date: July 9, 2023        Continue These Medications      Instructions Start Date   albuterol sulfate  (90 Base) MCG/ACT inhaler  Commonly known as: PROVENTIL HFA;VENTOLIN HFA;PROAIR HFA   2 puffs, Inhalation      aspirin 81 MG chewable tablet   81 mg, Oral, Daily      cholecalciferol 1.25 MG (27332 UT) capsule  Commonly known as: VITAMIN D3   1 capsule, Oral, Weekly      latanoprost 0.005 % ophthalmic solution  Commonly known as: XALATAN   1 drop, Both Eyes, Nightly      meloxicam 7.5 MG tablet  Commonly known as: MOBIC   7.5 mg, Oral, Daily      metFORMIN 1000 MG tablet  Commonly known as: GLUCOPHAGE   1,000 mg, Oral      multivitamin tablet tablet   1 capsule, Oral, Daily      sucralfate 1 g tablet  Commonly known as: CARAFATE   1 g, Oral, 4 Times Daily Before Meals & Nightly PRN      tamsulosin 0.4 MG capsule 24 hr capsule  Commonly known as: FLOMAX   0.4 mg, Oral, Daily         Stop These Medications    lovastatin 40 MG tablet  Commonly known as: MEVACOR  Replaced by: atorvastatin 10 MG tablet            Discharge Diet:   Diet Instructions     Diet: Cardiac Diets, Diabetic Diets; No Salt Packet; Regular Texture (IDDSI 7); Thin (IDDSI 0); Consistent Carbohydrate      Discharge Diet:  Cardiac Diets  Diabetic Diets       Cardiac Diet: No Salt Packet    Texture: Regular Texture (IDDSI 7)    Fluid Consistency: Thin (IDDSI 0)    Diabetic Diet: Consistent Carbohydrate           Activity at Discharge:   Activity Instructions      Activity as Tolerated             Discharge Care Plan/Instructions:     • **Acute pancreatitis, unspecified complication status, unspecified pancreatitis type Clinically improving. MRCP reviewed. Has gallstones. Will consult general surgery. Plan for OR today.    • BPH without obstruction/lower urinary tract symptoms Continue flomax   • Gallstone No acute cholecystitis   • Steatosis of liver No acute changes at this time   • Hypercholesterolemia Statin while here           Follow-up Appointments:   Future Appointments   Date Time Provider Department Center   7/26/2023  3:00 PM Jhonny King MD [facility]       Test Results Pending at Discharge:   Pending Labs     Order Current Status    TISSUE EXAM, [facility] ([facility]) Collected (07/07/23 5976)            Copied text in this note has been reviewed and is accurate as of 7/8/2023     Time: 32 minutes spent preparing discharge to include chart review, patient evaluation, medication reconciliation, discharge orders, and reviewing DC plan with CM, RN, patient and family to facilitate safe discharge.

## 2023-07-10 NOTE — PAYOR COMM NOTE
"Mya Black  [facility]  Case Management Extender  [phone] phone  [phone] fax      Ref# [id number]     Scott Meza (67 y.o. Male)       Date of Birth   1955    Social Security Number       Address   [address] [address]    Home Phone   [phone]    MRN   [id number]       Jew   Congregation    Marital Status   Single                            Admission Date   7/3/23    Admission Type   Emergency    Admitting Provider   Clifford Cannon MD    Attending Provider       Department, Room/Bed   [facility] [place], 427/1       Discharge Date   7/8/2023    Discharge Disposition   Home or Self Care    Discharge Destination                                 Attending Provider: (none)   Allergies: Celecoxib, Corticosteroids, Penicillins    Isolation: None   Infection: None   Code Status: Prior    Ht: 182.9 cm (72\")   Wt: 113 kg (248 lb 3.2 oz)    Admission Cmt: None   Principal Problem: Acute pancreatitis, unspecified complication status, unspecified pancreatitis type [K85.90]                   Active Insurance as of 7/3/2023       Primary Coverage       Payor Plan Insurance Group Employer/Plan Group    ANTHEM MEDICARE REPLACEMENT ANTHEM MEDICARE ADVANTAGE [id number]       Payor Plan Address Payor Plan Phone Number Payor Plan Fax Number Effective Dates    [address] [phone]  3/1/2021 - None Entered    [facility] [address]         Subscriber Name Subscriber Birth Date Member ID       SCOTT MEZA 1955 [id number]               Secondary Coverage       Payor Plan Insurance Group Employer/Plan Group    [place]        Payor Plan Address Payor Plan Phone Number Payor Plan Fax Number Effective Dates    [address] [phone]  9/1/2020 - None Entered    [facility] [id number]         Subscriber Name Subscriber Birth Date Member ID       SCOTT MEZA 1955 " [id number]                     Emergency Contacts        (Rel.) Home Phone Work Phone Mobile Phone    LYNNE VO (Friend) [phone] -- [phone]                 Discharge Summary        Teresa Diana PA-C at 07/08/23 0849              [facility] Services  DISCHARGE SUMMARY       Date of Admission: 7/3/2023  Date of Discharge:  7/8/2023  Primary Care Physician: Gita Barnard APRN    Presenting Problem/History of Present Illness:  Calculus of gallbladder with biliary obstruction but without cholecystitis [K80.21]  Acute pancreatitis, unspecified complication status, unspecified pancreatitis type [K85.90]       Final Discharge Diagnoses:  Active Hospital Problems    Diagnosis     **Acute pancreatitis, unspecified complication status, unspecified pancreatitis type     BPH without obstruction/lower urinary tract symptoms     Gallstone     Steatosis of liver     Hypercholesterolemia        Consults:   Consults       Date and Time Order Name Status Description    7/6/2023  9:31 AM Inpatient ENT Consult Completed     7/5/2023  2:25 PM Inpatient General Surgery Consult Completed     7/5/2023 11:08 AM Inpatient General Surgery Consult      7/3/2023 10:37 AM Gastroenterology (on-call MD unless specified) Completed             Procedures Performed: Procedure(s):  CHOLECYSTECTOMY LAPAROSCOPIC WITH DAVINCI ROBOT                Pertinent Test Results:   Lab Results (most recent)       Procedure Component Value Units Date/Time    Basic Metabolic Panel [[id number]]  (Abnormal) Collected: 07/08/23 0520    Specimen: Blood Updated: 07/08/23 0650     Glucose 167 mg/dL      BUN 8 mg/dL      Creatinine 0.83 mg/dL      Sodium 136 mmol/L      Potassium 4.6 mmol/L      Comment: Slight hemolysis detected by analyzer. Results may be affected.        Chloride 100 mmol/L      CO2 23.0 mmol/L      Calcium 9.3 mg/dL      BUN/Creatinine Ratio 9.6     Anion Gap 13.0  mmol/L      eGFR 96.5 mL/min/1.73     Narrative:      GFR Normal >60  Chronic Kidney Disease <60  Kidney Failure <15      CBC & Differential [[id number]]  (Abnormal) Collected: 07/08/23 0520    Specimen: Blood Updated: 07/08/23 0615    Narrative:      The following orders were created for panel order CBC & Differential.  Procedure                               Abnormality         Status                     ---------                               -----------         ------                     CBC Auto Differential[[id number]]        Abnormal            Final result                 Please view results for these tests on the individual orders.    CBC Auto Differential [[id number]]  (Abnormal) Collected: 07/08/23 0520    Specimen: Blood Updated: 07/08/23 0615     WBC 10.76 10*3/mm3      RBC 4.42 10*6/mm3      Hemoglobin 14.1 g/dL      Hematocrit 43.6 %      MCV 98.6 fL      MCH 31.9 pg      MCHC 32.3 g/dL      RDW 13.8 %      RDW-SD 51.0 fl      MPV 12.3 fL      Platelets 224 10*3/mm3      Neutrophil % 76.0 %      Lymphocyte % 15.9 %      Monocyte % 7.2 %      Eosinophil % 0.2 %      Basophil % 0.3 %      Immature Grans % 0.4 %      Neutrophils, Absolute 8.19 10*3/mm3      Lymphocytes, Absolute 1.71 10*3/mm3      Monocytes, Absolute 0.77 10*3/mm3      Eosinophils, Absolute 0.02 10*3/mm3      Basophils, Absolute 0.03 10*3/mm3      Immature Grans, Absolute 0.04 10*3/mm3      nRBC 0.0 /100 WBC     POC Glucose Once [[id number]]  (Normal) Collected: 07/07/23 1656    Specimen: Blood Updated: 07/08/23 0452     Glucose 111 mg/dL      Comment: RN NotifiedOperator: [id number] MICHELLELIN KATLINMeter ID: [id number]       POC Glucose Once [[id number]]  (Normal) Collected: 07/07/23 1039    Specimen: Blood Updated: 07/08/23 0452     Glucose 116 mg/dL      Comment: RN NotifiedOperator: [id number] CAMPLIN KATLINMeter ID: [id number]       Folate [[id number]]  (Normal) Collected: 07/07/23 0603    Specimen: Blood Updated: 07/07/23 1311      Folate 18.20 ng/mL     Narrative:      Results may be falsely increased if patient taking Biotin.      Vitamin B12 [[id number]]  (Abnormal) Collected: 07/07/23 0603    Specimen: Blood Updated: 07/07/23 1311     Vitamin B-12 1,362 pg/mL     Narrative:      Results may be falsely increased if patient taking Biotin.      Basic Metabolic Panel [[id number]]  (Abnormal) Collected: 07/07/23 0902    Specimen: Blood Updated: 07/07/23 0947     Glucose 155 mg/dL      BUN 7 mg/dL      Creatinine 0.78 mg/dL      Sodium 138 mmol/L      Potassium 4.1 mmol/L      Chloride 103 mmol/L      CO2 25.0 mmol/L      Calcium 9.0 mg/dL      BUN/Creatinine Ratio 9.0     Anion Gap 10.0 mmol/L      eGFR 98.4 mL/min/1.73     Narrative:      GFR Normal >60  Chronic Kidney Disease <60  Kidney Failure <15      CBC & Differential [[id number]]  (Abnormal) Collected: 07/07/23 0902    Specimen: Blood Updated: 07/07/23 0924    Narrative:      The following orders were created for panel order CBC & Differential.  Procedure                               Abnormality         Status                     ---------                               -----------         ------                     CBC Auto Differential[[id number]]        Abnormal            Final result                 Please view results for these tests on the individual orders.    CBC Auto Differential [[id number]]  (Abnormal) Collected: 07/07/23 0902    Specimen: Blood Updated: 07/07/23 0924     WBC 6.98 10*3/mm3      RBC 4.22 10*6/mm3      Hemoglobin 13.6 g/dL      Hematocrit 41.5 %      MCV 98.3 fL      MCH 32.2 pg      MCHC 32.8 g/dL      RDW 14.3 %      RDW-SD 52.0 fl      MPV 12.3 fL      Platelets 182 10*3/mm3      Neutrophil % 60.9 %      Lymphocyte % 25.8 %      Monocyte % 9.5 %      Eosinophil % 2.7 %      Basophil % 0.7 %      Immature Grans % 0.4 %      Neutrophils, Absolute 4.25 10*3/mm3      Lymphocytes, Absolute 1.80 10*3/mm3      Monocytes, Absolute 0.66 10*3/mm3      Eosinophils,  Absolute 0.19 10*3/mm3      Basophils, Absolute 0.05 10*3/mm3      Immature Grans, Absolute 0.03 10*3/mm3      nRBC 0.0 /100 WBC     Extra Tubes [[id number]] Collected: 07/06/23 0944    Specimen: Blood, Venous Line Updated: 07/06/23 1045    Narrative:      The following orders were created for panel order Extra Tubes.  Procedure                               Abnormality         Status                     ---------                               -----------         ------                     Lavender Top[[id number]]                                     Final result                 Please view results for these tests on the individual orders.    Lavender Top [[id number]] Collected: 07/06/23 0944    Specimen: Blood Updated: 07/06/23 1045     Extra Tube hold for add-on     Comment: Auto resulted       Magnesium [[id number]]  (Normal) Collected: 07/06/23 0939    Specimen: Blood Updated: 07/06/23 1007     Magnesium 2.1 mg/dL     Lipase [[id number]]  (Abnormal) Collected: 07/04/23 0520    Specimen: Blood Updated: 07/04/23 0624     Lipase 657 U/L     Magnesium [[id number]]  (Normal) Collected: 07/04/23 0520    Specimen: Blood Updated: 07/04/23 0617     Magnesium 2.2 mg/dL     Comprehensive Metabolic Panel [[id number]]  (Abnormal) Collected: 07/04/23 0520    Specimen: Blood Updated: 07/04/23 0617     Glucose 143 mg/dL      BUN 11 mg/dL      Creatinine 0.84 mg/dL      Sodium 138 mmol/L      Potassium 3.8 mmol/L      Chloride 106 mmol/L      CO2 23.0 mmol/L      Calcium 8.8 mg/dL      Total Protein 6.1 g/dL      Albumin 3.3 g/dL      ALT (SGPT) 250 U/L      AST (SGOT) 138 U/L      Alkaline Phosphatase 222 U/L      Total Bilirubin 1.6 mg/dL      Globulin 2.8 gm/dL      A/G Ratio 1.2 g/dL      BUN/Creatinine Ratio 13.1     Anion Gap 9.0 mmol/L      eGFR 96.2 mL/min/1.73     Narrative:      GFR Normal >60  Chronic Kidney Disease <60  Kidney Failure <15      Amylase [[id number]]  (Abnormal) Collected: 07/04/23 0520    Specimen: Blood  Updated: 07/04/23 0617     Amylase 492 U/L     Phosphorus [[id number]]  (Normal) Collected: 07/04/23 0520    Specimen: Blood Updated: 07/04/23 0617     Phosphorus 3.1 mg/dL     Extra Tubes [[id number]] Collected: 07/03/23 0835    Specimen: Blood, Venous Line Updated: 07/03/23 1615    Narrative:      The following orders were created for panel order Extra Tubes.  Procedure                               Abnormality         Status                     ---------                               -----------         ------                     Light Blue Top[[id number]]                                   Final result                 Please view results for these tests on the individual orders.    Light Blue Top [[id number]] Collected: 07/03/23 0835    Specimen: Blood Updated: 07/03/23 1615     Extra Tube Hold for add-ons.     Comment: Auto resulted       Gold Top - SST [[id number]] Collected: 07/03/23 0835    Specimen: Blood Updated: 07/03/23 1615     Extra Tube Hold for add-ons.     Comment: Auto resulted.       High Sensitivity Troponin T 2Hr [[id number]]  (Normal) Collected: 07/03/23 1044    Specimen: Blood Updated: 07/03/23 1111     HS Troponin T 7 ng/L      Troponin T Delta 0 ng/L     Narrative:      High Sensitive Troponin T Reference Range:  <10.0 ng/L- Negative Female for AMI  <15.0 ng/L- Negative Male for AMI  >=10 - Abnormal Female indicating possible myocardial injury.  >=15 - Abnormal Male indicating possible myocardial injury.   Clinicians would have to utilize clinical acumen, EKG, Troponin, and serial changes to determine if it is an Acute Myocardial Infarction or myocardial injury due to an underlying chronic condition.         Urinalysis With Culture If Indicated - Urine, Clean Catch [[id number]]  (Abnormal) Collected: 07/03/23 0943    Specimen: Urine, Clean Catch Updated: 07/03/23 1003     Color, UA Yellow     Appearance, UA Clear     pH, UA <=5.0     Specific Gravity, UA 1.026     Glucose, UA Negative     Ketones,  UA Negative     Bilirubin, UA Negative     Blood, UA Small (1+)     Protein, UA Negative     Leuk Esterase, UA Negative     Nitrite, UA Negative     Urobilinogen, UA 1.0 E.U./dL    Narrative:      In absence of clinical symptoms, the presence of pyuria, bacteria, and/or nitrites on the urinalysis result does not correlate with infection.    Urinalysis, Microscopic Only - Urine, Clean Catch [[id number]]  (Abnormal) Collected: 07/03/23 0943    Specimen: Urine, Clean Catch Updated: 07/03/23 1003     RBC, UA 0-2 /HPF      WBC, UA 0-2 /HPF      Comment: Urine culture not indicated.        Bacteria, UA None Seen /HPF      Squamous Epithelial Cells, UA 0-2 /HPF      Hyaline Casts, UA None Seen /LPF      Methodology Automated Microscopy    Lipase [[id number]]  (Abnormal) Collected: 07/03/23 0835    Specimen: Blood Updated: 07/03/23 0912     Lipase >3,000 U/L     Comprehensive Metabolic Panel [[id number]]  (Abnormal) Collected: 07/03/23 0835    Specimen: Blood Updated: 07/03/23 0904     Glucose 141 mg/dL      BUN 13 mg/dL      Creatinine 0.81 mg/dL      Sodium 139 mmol/L      Potassium 3.9 mmol/L      Chloride 108 mmol/L      CO2 22.0 mmol/L      Calcium 9.3 mg/dL      Total Protein 7.7 g/dL      Albumin 3.9 g/dL      ALT (SGPT) 313 U/L      AST (SGOT) 196 U/L      Alkaline Phosphatase 294 U/L      Total Bilirubin 2.7 mg/dL      Globulin 3.8 gm/dL      A/G Ratio 1.0 g/dL      BUN/Creatinine Ratio 16.0     Anion Gap 9.0 mmol/L      eGFR 97.2 mL/min/1.73     Narrative:      GFR Normal >60  Chronic Kidney Disease <60  Kidney Failure <15      High Sensitivity Troponin T [[id number]]  (Normal) Collected: 07/03/23 0835    Specimen: Blood Updated: 07/03/23 0904     HS Troponin T 7 ng/L     Narrative:      High Sensitive Troponin T Reference Range:  <10.0 ng/L- Negative Female for AMI  <15.0 ng/L- Negative Male for AMI  >=10 - Abnormal Female indicating possible myocardial injury.  >=15 - Abnormal Male indicating possible myocardial  injury.   Clinicians would have to utilize clinical acumen, EKG, Troponin, and serial changes to determine if it is an Acute Myocardial Infarction or myocardial injury due to an underlying chronic condition.         BNP [[id number]]  (Normal) Collected: 07/03/23 0835    Specimen: Blood Updated: 07/03/23 0902     proBNP 61.6 pg/mL     Narrative:      Among patients with dyspnea, NT-proBNP is highly sensitive for the detection of acute congestive heart failure. In addition NT-proBNP of <300 pg/ml effectively rules out acute congestive heart failure with 99% negative predictive value.      Lactic Acid, Plasma [[id number]]  (Normal) Collected: 07/03/23 0835    Specimen: Blood Updated: 07/03/23 0900     Lactate 0.9 mmol/L           Imaging Results (Most Recent)       Procedure Component Value Units Date/Time    MRI abdomen wo contrast mrcp [[id number]] Collected: 07/03/23 1523     Updated: 07/03/23 1559    Narrative:        HISTORY:  Abdominal pain and vomiting.    COMPARISON:  CT abdomen and pelvis 7/3/2023    TECHNIQUE:  Multiplanar, multisequence imaging through the abdomen without administration of  intravenous gadolinium.  3-D imaging of the biliary tree.    FINDINGS:  Gallstones are present.  No choledocholithiasis or biliary dilatation.  Pancreatic duct also appears normal in caliber.  There is edema about the  pancreas.  This extends toward the left and right paracolic gutters.    Background fatty infiltration of the liver but no focal lesion.  Spleen is  normal in size.  The visualized kidneys appear normal.    IMPRESSIONS:  1.  Moderate acute pancreatitis.    2.  Gallstones.  There is gallbladder distention but no evidence of  choledocholithiasis or biliary dilatation.    CT Abdomen Pelvis With Contrast [[id number]] Collected: 07/03/23 0925     Updated: 07/03/23 1025    Narrative:      History:  Abdominal pain.    comparison:  none.    TECHNIQUE:  Intravenous contrast was administered and axial images from the  "level of the  diaphragms through the pelvis were performed followed by 2D multiplanar  reformats.    FINDINGS:  The lung bases are clear. There is cholelithiasis. The liver, spleen, kidneys  and adrenals are normal. There is peripancreatic inflammatory change consistent  with acute pancreatitis. There are no dilated loops of bowel.      Impression:      1. Peripancreatic inflammatory change involving the pancreatic tail suggesting  acute pancreatitis.    2. Cholelithiasis.            Chief Complaint on Day of Discharge: None    Hospital Course:  The patient is a 67 y.o. male who presented to [facility] with chief complaint of abdominal pain, nausea, vomiting abdomen pelvis showed acute pancreatitis and cholelithiasis.  Patient does admit to drinking alcohol.  MRCP showed no CBD pathology.  His pancreatitis pain improved significantly and he underwent laparoscopic cholecystectomy with Dr. Dennison.  He tolerated this procedure well.  On day of discharge he is ready to go home, feeling much better.  Will follow closely with surgery outpatient.    Condition on Discharge: Stable, improved    Physical Exam on Discharge:  /86 (BP Location: Left arm, Patient Position: Lying)   Pulse 65   Temp 98.4 °F (36.9 °C) (Temporal)   Resp 18   Ht 182.9 cm (72\")   Wt 113 kg (248 lb 3.2 oz)   SpO2 95%   BMI 33.66 kg/m²     Vitals and nursing note reviewed.   Constitutional:       General: No acute distress.     Appearance: Normal appearance.   HENT:      Head: Normocephalic and atraumatic.      Right Ear: External ear normal.      Left Ear: External ear normal.      Nose: Nose normal.      Mouth/Throat:      Mouth: Mucous membranes are moist.   Eyes:      Conjunctivae/sclerae: Conjunctivae normal.      Pupils: Pupils are equal, round, and reactive to light.   Cardiovascular:      Rate and Rhythm: Normal rate and regular rhythm.   Pulmonary:      Effort: Pulmonary effort is normal.      Breath sounds: Normal " breath sounds.   Abdominal:      General: Abdomen is flat.      Palpations: Abdomen is soft.      Tenderness: There is no abdominal tenderness.   Genitourinary:   Musculoskeletal:         General: No signs of injury. Normal range of motion.      Cervical back: No tenderness.   Skin:     General: Skin is warm and dry.   Neurological:      General: No focal deficit present.      Mental Status: Alert and oriented to person, place, and time.   Psychiatric:         Mood and Affect: Mood normal.         Behavior: Behavior normal.         Discharge Medications:     Discharge Medications        New Medications        Instructions Start Date   atorvastatin 10 MG tablet  Commonly known as: LIPITOR  Replaces: lovastatin 40 MG tablet   10 mg, Oral, Daily   Start Date: July 9, 2023            Continue These Medications        Instructions Start Date   albuterol sulfate  (90 Base) MCG/ACT inhaler  Commonly known as: PROVENTIL HFA;VENTOLIN HFA;PROAIR HFA   2 puffs, Inhalation      aspirin 81 MG chewable tablet   81 mg, Oral, Daily      cholecalciferol 1.25 MG (17275 UT) capsule  Commonly known as: VITAMIN D3   1 capsule, Oral, Weekly      latanoprost 0.005 % ophthalmic solution  Commonly known as: XALATAN   1 drop, Both Eyes, Nightly      meloxicam 7.5 MG tablet  Commonly known as: MOBIC   7.5 mg, Oral, Daily      metFORMIN 1000 MG tablet  Commonly known as: GLUCOPHAGE   1,000 mg, Oral      multivitamin tablet tablet   1 capsule, Oral, Daily      sucralfate 1 g tablet  Commonly known as: CARAFATE   1 g, Oral, 4 Times Daily Before Meals & Nightly PRN      tamsulosin 0.4 MG capsule 24 hr capsule  Commonly known as: FLOMAX   0.4 mg, Oral, Daily             Stop These Medications      lovastatin 40 MG tablet  Commonly known as: MEVACOR  Replaced by: atorvastatin 10 MG tablet              Discharge Diet:   Diet Instructions       Diet: Cardiac Diets, Diabetic Diets; No Salt Packet; Regular Texture (IDDSI 7); Thin (IDDSI 0);  Consistent Carbohydrate      Discharge Diet:  Cardiac Diets  Diabetic Diets       Cardiac Diet: No Salt Packet    Texture: Regular Texture (IDDSI 7)    Fluid Consistency: Thin (IDDSI 0)    Diabetic Diet: Consistent Carbohydrate             Activity at Discharge:   Activity Instructions       Activity as Tolerated               Discharge Care Plan/Instructions:      **Acute pancreatitis, unspecified complication status, unspecified pancreatitis type Clinically improving. MRCP reviewed. Has gallstones. Will consult general surgery. Plan for OR today.     BPH without obstruction/lower urinary tract symptoms Continue flomax    Gallstone No acute cholecystitis    Steatosis of liver No acute changes at this time    Hypercholesterolemia Statin while here           Follow-up Appointments:   Future Appointments   Date Time Provider Department Center   7/26/2023  3:00 PM Jhonny King MD [facility]       Test Results Pending at Discharge:   Pending Labs       Order Current Status    TISSUE EXAM, [facility] ([facility]) Collected (07/07/23 1539)              Copied text in this note has been reviewed and is accurate as of 7/8/2023     Time: 32 minutes spent preparing discharge to include chart review, patient evaluation, medication reconciliation, discharge orders, and reviewing DC plan with CM, RN, patient and family to facilitate safe discharge.               Electronically signed by Teresa Diana PA-C at 07/08/23 1226       Discharge Order (From admission, onward)       Start     Ordered    07/08/23 0850  Discharge patient  Once        Expected Discharge Date: 07/08/23    Discharge Disposition: Home or Self Care    Physician of Record for Attribution - Please select from Treatment Team: JOHNATHON BHAKTA [[id number]]    Review needed by CMO to determine Physician of Record: No       Question Answer Comment   Physician of Record for Attribution - Please select from Treatment Team JOHNATHON BHAKTA    Review needed by  CMO to determine Physician of Record No        07/08/23 0853

## 2023-07-12 LAB — REF LAB TEST METHOD: NORMAL

## 2023-07-24 ENCOUNTER — OFFICE VISIT (OUTPATIENT)
Dept: SURGERY | Facility: CLINIC | Age: 68
End: 2023-07-24
Payer: MEDICARE

## 2023-07-24 VITALS
HEART RATE: 51 BPM | TEMPERATURE: 97.9 F | BODY MASS INDEX: 33.46 KG/M2 | WEIGHT: 247 LBS | HEIGHT: 72 IN | DIASTOLIC BLOOD PRESSURE: 86 MMHG | SYSTOLIC BLOOD PRESSURE: 150 MMHG | OXYGEN SATURATION: 97 %

## 2023-07-24 DIAGNOSIS — K85.10 GALLSTONE PANCREATITIS: ICD-10-CM

## 2023-07-24 DIAGNOSIS — R58 BLEEDING: Primary | ICD-10-CM

## 2023-07-24 PROCEDURE — 1159F MED LIST DOCD IN RCRD: CPT | Performed by: STUDENT IN AN ORGANIZED HEALTH CARE EDUCATION/TRAINING PROGRAM

## 2023-07-24 PROCEDURE — 99024 POSTOP FOLLOW-UP VISIT: CPT | Performed by: STUDENT IN AN ORGANIZED HEALTH CARE EDUCATION/TRAINING PROGRAM

## 2023-07-24 PROCEDURE — 1160F RVW MEDS BY RX/DR IN RCRD: CPT | Performed by: STUDENT IN AN ORGANIZED HEALTH CARE EDUCATION/TRAINING PROGRAM

## 2023-07-24 NOTE — PROGRESS NOTES
CHIEF COMPLAINT:   Chief Complaint   Patient presents with    Post-op     Robotic lap carrie 07/07/2023       HPI: This patient presents for a post-operative visit after undergoing a robotic assisted  cholecystectomy.  Patient reports he has had mulitple recurrent episodes of bleeding and is requesting a referral to Dr. Ying. Eating well without any significant nausea. Having good bowel function. No problems with constipation or diarrhea. No urinary complaints. Denies fever. Ambulating well and slowly returning to normal activities.    PATHOLOGY:     PHYSICAL EXAM:    ABD: Incisions are healing well without any erythema or signs of infection. Remains tender around his incision sites.     ASSESSMENT:    Diagnoses and all orders for this visit:    1. Bleeding (Primary)  -     Ambulatory Referral to ENT (Otolaryngology)    2. Gallstone pancreatitis        PLAN:    1.The patient will follow-up in two weeks for recheck  2. Okay to return to normal activities        This document has been electronically signed by Raymon Dennison MD on July 24, 2023 10:05 CDT

## 2023-07-26 ENCOUNTER — OFFICE VISIT (OUTPATIENT)
Dept: GASTROENTEROLOGY | Facility: CLINIC | Age: 68
End: 2023-07-26
Payer: MEDICARE

## 2023-07-26 ENCOUNTER — READMISSION MANAGEMENT (OUTPATIENT)
Dept: CALL CENTER | Facility: HOSPITAL | Age: 68
End: 2023-07-26
Payer: MEDICARE

## 2023-07-26 VITALS
WEIGHT: 248.2 LBS | SYSTOLIC BLOOD PRESSURE: 157 MMHG | DIASTOLIC BLOOD PRESSURE: 75 MMHG | HEART RATE: 63 BPM | HEIGHT: 72 IN | BODY MASS INDEX: 33.62 KG/M2

## 2023-07-26 DIAGNOSIS — R79.89 ELEVATED LFTS: Primary | ICD-10-CM

## 2023-07-26 NOTE — PROGRESS NOTES
Chief Complaint   Patient presents with    Gastric Ulcer       Subjective    Scott Scanlon is a 67 y.o. male.    History of Present Illness  Patient presented to GI clinic for follow-up visit today.  Feels better currently.  Abdominal pain has improved with cholecystectomy.  Denies nausea or vomiting.  Weight is stable.  Has epistaxis.  Has ENT evaluation scheduled with Dr. Ying next week.       The following portions of the patient's history were reviewed and updated as appropriate:   Past Medical History:   Diagnosis Date    BPH (benign prostatic hyperplasia)     Diabetes mellitus     Hypercholesterolemia     Hyperlipidemia      Past Surgical History:   Procedure Laterality Date    CHOLECYSTECTOMY N/A 7/7/2023    Procedure: CHOLECYSTECTOMY LAPAROSCOPIC WITH DAVINCI ROBOT;  Surgeon: Raymon Dennison MD;  Location: [facility] OR;  Service: Robotics - DaVinci;  Laterality: N/A;    COLONOSCOPY N/A 1/10/2022    COLONOSCOPY N/A 1/4/2023    Procedure: COLONOSCOPY;  Surgeon: Jhonny King MD;  Location: [facility] ENDOSCOPY;  Service: Gastroenterology;  Laterality: N/A;    ENDOSCOPY N/A 1/4/2023    Procedure: ESOPHAGOGASTRODUODENOSCOPY;  Surgeon: Jhonny King MD;  Location: [facility] ENDOSCOPY;  Service: Gastroenterology;  Laterality: N/A;    ENDOSCOPY N/A 3/10/2023    Procedure: ESOPHAGOGASTRODUODENOSCOPY;  Surgeon: Jhonny King MD;  Location: [facility] ENDOSCOPY;  Service: Gastroenterology;  Laterality: N/A;    JOINT REPLACEMENT      KNEE ARTHROSCOPY Left     TOTAL KNEE ARTHROPLASTY Right      Family History   Problem Relation Age of Onset    Hypertension Mother     Hypertension Father        Prior to Admission medications    Medication Sig Start Date End Date Taking? Authorizing Provider   albuterol sulfate  (90 Base) MCG/ACT inhaler Inhale 2 puffs. 7/6/22  Yes Provider, MD Glory   aspirin 81 MG chewable tablet Chew 1 tablet Daily. 1/29/22  Yes Aj Parks MD   atorvastatin (LIPITOR) 10 MG  tablet Take 1 tablet by mouth Daily. 7/9/23  Yes Teresa Diana PA-C   cholecalciferol (VITAMIN D3) 1.25 MG (88140 UT) capsule Take 1 capsule by mouth 1 (One) Time Per Week. 7/6/22  Yes Glory Ramos MD   latanoprost (XALATAN) 0.005 % ophthalmic solution Administer 1 drop to both eyes Every Night.   Yes Glory Ramos MD   meloxicam (MOBIC) 7.5 MG tablet Take 1 tablet by mouth Daily.   Yes Glory Ramos MD   metFORMIN (GLUCOPHAGE) 1000 MG tablet Take 1 tablet by mouth. 7/6/22  Yes Glory Ramos MD   multivitamin (THERAGRAN) tablet tablet Take 1 tablet by mouth Daily.   Yes Glory Ramos MD   sucralfate (CARAFATE) 1 g tablet Take 1 tablet by mouth 4 (Four) Times a Day Before Meals & at Bedtime As Needed. 2/7/23  Yes Glory Ramos MD   tamsulosin (FLOMAX) 0.4 MG capsule 24 hr capsule Take 1 capsule by mouth Daily. 1/29/22  Yes Aj Parks MD     Allergies   Allergen Reactions    Celecoxib Confusion    Corticosteroids Other (See Comments)     Pt states caused convulsions    Penicillins Confusion     Social History     Socioeconomic History    Marital status: Single   Tobacco Use    Smoking status: Some Days     Years: 30.00     Types: Cigarettes     Passive exposure: Current    Smokeless tobacco: Never   Vaping Use    Vaping Use: Never used   Substance and Sexual Activity    Alcohol use: Yes     Comment: occ beer    Drug use: Never    Sexual activity: Defer       Review of Systems  Review of Systems   Constitutional:  Negative for chills, fatigue, fever and unexpected weight change.   HENT:  Positive for nosebleeds. Negative for congestion, ear discharge, hearing loss and sore throat.    Eyes:  Negative for pain, discharge and redness.   Respiratory:  Negative for cough, chest tightness, shortness of breath and wheezing.    Cardiovascular:  Negative for chest pain and palpitations.   Gastrointestinal:  Negative for abdominal distention, abdominal pain, blood in  "stool, constipation, diarrhea, nausea and vomiting.   Endocrine: Negative for cold intolerance, polydipsia, polyphagia and polyuria.   Genitourinary:  Negative for dysuria, flank pain, frequency, hematuria and urgency.   Musculoskeletal:  Negative for arthralgias, back pain, joint swelling and myalgias.   Skin:  Negative for color change, pallor and rash.   Neurological:  Negative for tremors, seizures, syncope, weakness and headaches.   Hematological:  Negative for adenopathy. Does not bruise/bleed easily.   Psychiatric/Behavioral:  Negative for behavioral problems, confusion, dysphoric mood, hallucinations and suicidal ideas. The patient is not nervous/anxious.       /75   Pulse 63   Ht 182.9 cm (72\")   Wt 113 kg (248 lb 3.2 oz)   BMI 33.66 kg/m²     Objective    Physical Exam  Constitutional:       Appearance: He is well-developed.   HENT:      Head: Normocephalic and atraumatic.   Eyes:      Conjunctiva/sclera: Conjunctivae normal.      Pupils: Pupils are equal, round, and reactive to light.   Neck:      Thyroid: No thyromegaly.   Cardiovascular:      Rate and Rhythm: Normal rate and regular rhythm.      Heart sounds: Normal heart sounds. No murmur heard.  Pulmonary:      Effort: Pulmonary effort is normal.      Breath sounds: Normal breath sounds. No wheezing.   Abdominal:      General: Bowel sounds are normal. There is no distension.      Palpations: Abdomen is soft. There is no mass.      Tenderness: There is no abdominal tenderness.      Hernia: No hernia is present.   Genitourinary:     Comments: No lesions noted  Musculoskeletal:         General: No tenderness. Normal range of motion.      Cervical back: Normal range of motion and neck supple.   Lymphadenopathy:      Cervical: No cervical adenopathy.   Skin:     General: Skin is warm and dry.      Findings: No rash.   Neurological:      Mental Status: He is alert and oriented to person, place, and time.      Cranial Nerves: No cranial nerve " deficit.   Psychiatric:         Thought Content: Thought content normal.     No results displayed because visit has over 200 results.        Assessment & Plan      1. Elevated LFTs    1.  Abdominal pain, resolved with cholecystectomy.  2.  Gastric ulcer, healed upon repeat endoscopy.  3.  Diverticulosis, continue high-fiber diet.  4.  Elevated liver enzymes, improving.  Repeat LFTs today.  4.  Colon polyps, repeat colonoscopy in 3 years.  5.  Epistaxis, awaiting ENT evaluation.  Most recent hemoglobin was normal at 14.1.  6.  Abdominal pain with rectal bleeding, well controlled.  7.  Obesity, recommend exercise and diet control.       Orders placed during this encounter include:  Orders Placed This Encounter   Procedures    Hepatic Function Panel     Order Specific Question:   Release to patient     Answer:   Routine Release       * Surgery not found *    Review and/or summary of lab tests, radiology, procedures, medications. Review and summary of old records and obtaining of history. The risks and benefits of my recommendations, as well as other treatment options were discussed with the patient and family member(s) today. Questions were answered.    No orders of the defined types were placed in this encounter.      Follow-up: Return in about 2 months (around 9/26/2023).               Results for orders placed or performed during the hospital encounter of 07/03/23   Gold Top - SST   Result Value Ref Range    Extra Tube Hold for add-ons.    High Sensitivity Troponin T 2Hr    Specimen: Blood   Result Value Ref Range    HS Troponin T 7 <15 ng/L    Troponin T Delta 0 >=-4 - <+4 ng/L   TISSUE EXAM, [facility] ([facility])    Specimen: Gallbladder; Tissue   Result Value Ref Range    Reference Lab Report       [facility]  [address] [place] [address]  Phone: [phone] or [phone]  Fax: [phone]  Umberto Maynard M.D., Medical Director    PATIENT NAME                            "LABORATORY NO.  [id number]  [address].                [id number]  [id number]                         AGE              SEX  N           CLIENT REF #  [facility]           67      1955  IRA    [id number]   [id number]    [place]                       REQUESTING M.D.     ATTENDING M.D.     COPY TO.  [address] [place] [address]                 [place] [address]             DATE COLLECTED      DATE RECEIVED      DATE REPORTED  2023          07/10/2023         2023    DIAGNOSIS:  GALLBLADDER:  Chronic cholecystitis.  Cholelithiasis.      CLINICAL HISTORY:  Acute pancreatitis, unspecified complication status, unspecified pancreatitis  type, calculus of gallbladder with biliary obstruction but without  cholecystitis    SPECIMENS RECEIVED:  GALLBLADDER    MICROSCOPIC DESCRIPTION:  Tissue blocks are prepared and slides are examined microscopically on all  specimens. See diagnosis for details.    Professional interpretation rendered by Christiano Orozco M.D. at [facility], [address] [place] [address].    GROSS DESCRIPTION:  Received in formalin labeled \"gallbladder\", is a 9.4 x 2.7 x 2.7 cm focally  disrupted gallbladder.  The serosa is green-yellow, smooth and glistening.  The  hepatic surface is significant for a single surgical defect that measures up to 2.0  cm.  The cystic duct margin is inked blue.  Opening of the gallbladder reveals a  moderate amount of green-yellow, viscous to sludgelike bile and several black,  fragmented to oxalate stones.  The stones range from less than 0.1 cm to 0.8 cm  and are scattered throughout the entirety of the specimen including the cystic  duct.  The mucosa is yellow-green, velvety and moderately sloughing.  No  masses, lesions or  polyps are identified.  The wall thickness measures up to 0.2  cm.  No pericystic lymph node is identified..  Representative sections are  submitted in cassette A1 to " include the cystic duct margin, en face and  gallbladder mucosa from the body, neck, fundus.  MLA    REVIEWED, DIAGNOSED AND ELECTRONICALLY  SIGNED BY:    Christiano Orozco M.D.  CPT CODES:  74042     Urinalysis, Microscopic Only - Urine, Clean Catch    Specimen: Urine, Clean Catch   Result Value Ref Range    RBC, UA 0-2 (A) None Seen /HPF    WBC, UA 0-2 None Seen, 0-2, 3-5 /HPF    Bacteria, UA None Seen None Seen /HPF    Squamous Epithelial Cells, UA 0-2 None Seen, 0-2 /HPF    Hyaline Casts, UA None Seen None Seen /LPF    Methodology Automated Microscopy    Urinalysis With Culture If Indicated - Urine, Clean Catch    Specimen: Urine, Clean Catch   Result Value Ref Range    Color, UA Yellow Yellow, Straw, Dark Yellow, Connie    Appearance, UA Clear Clear    pH, UA <=5.0 5.0 - 9.0    Specific [place], UA 1.026 1.003 - 1.030    Glucose, UA Negative Negative    Ketones, UA Negative Negative    Bilirubin, UA Negative Negative    Blood, UA Small (1+) (A) Negative    Protein, UA Negative Negative    Leuk Esterase, UA Negative Negative    Nitrite, UA Negative Negative    Urobilinogen, UA 1.0 E.U./dL 0.2 - 1.0 E.U./dL   CBC Auto Differential    Specimen: Blood   Result Value Ref Range    WBC 10.76 3.40 - 10.80 10*3/mm3    RBC 4.42 4.14 - 5.80 10*6/mm3    Hemoglobin 14.1 13.0 - 17.7 g/dL    Hematocrit 43.6 37.5 - 51.0 %    MCV 98.6 (H) 79.0 - 97.0 fL    MCH 31.9 26.6 - 33.0 pg    MCHC 32.3 31.5 - 35.7 g/dL    RDW 13.8 12.3 - 15.4 %    RDW-SD 51.0 37.0 - 54.0 fl    MPV 12.3 (H) 6.0 - 12.0 fL    Platelets 224 140 - 450 10*3/mm3    Neutrophil % 76.0 42.7 - 76.0 %    Lymphocyte % 15.9 (L) 19.6 - 45.3 %    Monocyte % 7.2 5.0 - 12.0 %    Eosinophil % 0.2 (L) 0.3 - 6.2 %    Basophil % 0.3 0.0 - 1.5 %    Immature Grans % 0.4 0.0 - 0.5 %    Neutrophils, Absolute 8.19 (H) 1.70 - 7.00 10*3/mm3    Lymphocytes, Absolute 1.71 0.70 - 3.10 10*3/mm3    Monocytes, Absolute 0.77 0.10 - 0.90 10*3/mm3    Eosinophils, Absolute 0.02 0.00 - 0.40  10*3/mm3    Basophils, Absolute 0.03 0.00 - 0.20 10*3/mm3    Immature Grans, Absolute 0.04 0.00 - 0.05 10*3/mm3    nRBC 0.0 0.0 - 0.2 /100 WBC   CBC Auto Differential    Specimen: Blood   Result Value Ref Range    WBC 6.98 3.40 - 10.80 10*3/mm3    RBC 4.22 4.14 - 5.80 10*6/mm3    Hemoglobin 13.6 13.0 - 17.7 g/dL    Hematocrit 41.5 37.5 - 51.0 %    MCV 98.3 (H) 79.0 - 97.0 fL    MCH 32.2 26.6 - 33.0 pg    MCHC 32.8 31.5 - 35.7 g/dL    RDW 14.3 12.3 - 15.4 %    RDW-SD 52.0 37.0 - 54.0 fl    MPV 12.3 (H) 6.0 - 12.0 fL    Platelets 182 140 - 450 10*3/mm3    Neutrophil % 60.9 42.7 - 76.0 %    Lymphocyte % 25.8 19.6 - 45.3 %    Monocyte % 9.5 5.0 - 12.0 %    Eosinophil % 2.7 0.3 - 6.2 %    Basophil % 0.7 0.0 - 1.5 %    Immature Grans % 0.4 0.0 - 0.5 %    Neutrophils, Absolute 4.25 1.70 - 7.00 10*3/mm3    Lymphocytes, Absolute 1.80 0.70 - 3.10 10*3/mm3    Monocytes, Absolute 0.66 0.10 - 0.90 10*3/mm3    Eosinophils, Absolute 0.19 0.00 - 0.40 10*3/mm3    Basophils, Absolute 0.05 0.00 - 0.20 10*3/mm3    Immature Grans, Absolute 0.03 0.00 - 0.05 10*3/mm3    nRBC 0.0 0.0 - 0.2 /100 WBC   CBC Auto Differential    Specimen: Blood   Result Value Ref Range    WBC 8.15 3.40 - 10.80 10*3/mm3    RBC 4.77 4.14 - 5.80 10*6/mm3    Hemoglobin 15.0 13.0 - 17.7 g/dL    Hematocrit 46.6 37.5 - 51.0 %    MCV 97.7 (H) 79.0 - 97.0 fL    MCH 31.4 26.6 - 33.0 pg    MCHC 32.2 31.5 - 35.7 g/dL    RDW 14.6 12.3 - 15.4 %    RDW-SD 53.1 37.0 - 54.0 fl    MPV 12.2 (H) 6.0 - 12.0 fL    Platelets 161 140 - 450 10*3/mm3    Neutrophil % 66.3 42.7 - 76.0 %    Lymphocyte % 21.6 19.6 - 45.3 %    Monocyte % 9.1 5.0 - 12.0 %    Eosinophil % 2.3 0.3 - 6.2 %    Basophil % 0.5 0.0 - 1.5 %    Immature Grans % 0.2 0.0 - 0.5 %    Neutrophils, Absolute 5.40 1.70 - 7.00 10*3/mm3    Lymphocytes, Absolute 1.76 0.70 - 3.10 10*3/mm3    Monocytes, Absolute 0.74 0.10 - 0.90 10*3/mm3    Eosinophils, Absolute 0.19 0.00 - 0.40 10*3/mm3    Basophils, Absolute 0.04 0.00 - 0.20  10*3/mm3    Immature Grans, Absolute 0.02 0.00 - 0.05 10*3/mm3    nRBC 0.0 0.0 - 0.2 /100 WBC   CBC Auto Differential    Specimen: Blood   Result Value Ref Range    WBC 7.61 3.40 - 10.80 10*3/mm3    RBC 4.30 4.14 - 5.80 10*6/mm3    Hemoglobin 13.6 13.0 - 17.7 g/dL    Hematocrit 42.5 37.5 - 51.0 %    MCV 98.8 (H) 79.0 - 97.0 fL    MCH 31.6 26.6 - 33.0 pg    MCHC 32.0 31.5 - 35.7 g/dL    RDW 15.7 (H) 12.3 - 15.4 %    RDW-SD 57.8 (H) 37.0 - 54.0 fl    MPV 12.3 (H) 6.0 - 12.0 fL    Platelets 132 (L) 140 - 450 10*3/mm3    Neutrophil % 70.5 42.7 - 76.0 %    Lymphocyte % 20.0 19.6 - 45.3 %    Monocyte % 7.6 5.0 - 12.0 %    Eosinophil % 1.1 0.3 - 6.2 %    Basophil % 0.4 0.0 - 1.5 %    Immature Grans % 0.4 0.0 - 0.5 %    Neutrophils, Absolute 5.37 1.70 - 7.00 10*3/mm3    Lymphocytes, Absolute 1.52 0.70 - 3.10 10*3/mm3    Monocytes, Absolute 0.58 0.10 - 0.90 10*3/mm3    Eosinophils, Absolute 0.08 0.00 - 0.40 10*3/mm3    Basophils, Absolute 0.03 0.00 - 0.20 10*3/mm3    Immature Grans, Absolute 0.03 0.00 - 0.05 10*3/mm3    nRBC 0.0 0.0 - 0.2 /100 WBC     *Note: Due to a large number of results and/or encounters for the requested time period, some results have not been displayed. A complete set of results can be found in Results Review.         This document has been electronically signed by Jhonny King MD on July 26, 2023 15:22 CDT

## 2023-07-27 NOTE — OUTREACH NOTE
Medical Week 3 Survey      Flowsheet Row Responses   [facility] patient discharged from? [place]   Does the patient have one of the following disease processes/diagnoses(primary or secondary)? Other   Week 3 attempt successful? Yes   Call start time 2018   Call end time 2021   Discharge diagnosis *Acute pancreatitis, unspecified complication status, unspecified pancreatitis type   Meds reviewed with patient/caregiver? Yes   Is the patient having any side effects they believe may be caused by any medication additions or changes? No   Does the patient have all medications ordered at discharge? Yes   Is the patient taking all medications as directed (includes completed medication regime)? Yes   Does the patient have a primary care provider?  Yes   Has the patient kept scheduled appointments due by today? Yes   Comments Followed up with surgeon and Dr. King   Psychosocial issues? No   What is the patient's perception of their health status since discharge? Improving   Is the patient/caregiver able to teach back signs and symptoms related to disease process for when to call PCP? Yes   Is the patient/caregiver able to teach back signs and symptoms related to disease process for when to call 911? Yes   Is the patient/caregiver able to teach back the hierarchy of who to call/visit for symptoms/problems? PCP, Specialist, Home health nurse, Urgent Care, ED, 911 Yes   Additional teach back comments States he is doing well.   Week 3 Call Completed? Yes   Graduated Yes   Graduated/Revoked comments Denies questions or needs at this time.   Call end time 2021            Becca MACARIO - Licensed Nurse

## 2023-07-31 ENCOUNTER — OFFICE VISIT (OUTPATIENT)
Dept: OTOLARYNGOLOGY | Facility: CLINIC | Age: 68
End: 2023-07-31
Payer: MEDICARE

## 2023-07-31 VITALS — OXYGEN SATURATION: 97 % | HEART RATE: 79 BPM | BODY MASS INDEX: 33.26 KG/M2 | HEIGHT: 72 IN | WEIGHT: 245.6 LBS

## 2023-07-31 DIAGNOSIS — R04.0 RECURRENT EPISTAXIS: Primary | ICD-10-CM

## 2023-08-31 ENCOUNTER — OFFICE VISIT (OUTPATIENT)
Dept: SURGERY | Facility: CLINIC | Age: 68
End: 2023-08-31
Payer: MEDICARE

## 2023-08-31 VITALS
HEIGHT: 72 IN | DIASTOLIC BLOOD PRESSURE: 74 MMHG | WEIGHT: 249.9 LBS | OXYGEN SATURATION: 97 % | BODY MASS INDEX: 33.85 KG/M2 | TEMPERATURE: 97.5 F | SYSTOLIC BLOOD PRESSURE: 138 MMHG | HEART RATE: 64 BPM

## 2023-08-31 DIAGNOSIS — K85.10 GALLSTONE PANCREATITIS: Primary | ICD-10-CM

## 2023-08-31 PROCEDURE — 99024 POSTOP FOLLOW-UP VISIT: CPT | Performed by: STUDENT IN AN ORGANIZED HEALTH CARE EDUCATION/TRAINING PROGRAM

## 2023-08-31 PROCEDURE — 1160F RVW MEDS BY RX/DR IN RCRD: CPT | Performed by: STUDENT IN AN ORGANIZED HEALTH CARE EDUCATION/TRAINING PROGRAM

## 2023-08-31 PROCEDURE — 1159F MED LIST DOCD IN RCRD: CPT | Performed by: STUDENT IN AN ORGANIZED HEALTH CARE EDUCATION/TRAINING PROGRAM

## 2023-08-31 RX ORDER — CHLORHEXIDINE GLUCONATE 0.12 MG/ML
RINSE ORAL 2 TIMES DAILY
COMMUNITY
Start: 2023-08-11

## 2023-09-07 NOTE — PROGRESS NOTES
CHIEF COMPLAINT:   Chief Complaint   Patient presents with    Follow-up     2 week follow-up       HPI: This patient presents for a post-operative visit after undergoing a robotic assisted  cholecystectomy.  Patient reports no problems. No pain tolerating a regular diet without nausea/vomiting. No diarrhea. Incisions have healed      PHYSICAL EXAM:    ABD: Incisions are healing well without any erythema or signs of infection.    ASSESSMENT:    Diagnoses and all orders for this visit:    1. Gallstone pancreatitis (Primary)        PLAN:    1.The patient will follow-up on a prn basis unless there are any problems.  2. May return to normal activity without restrictions.        This document has been electronically signed by Raymon Dennison MD on September 7, 2023 08:05 CDT

## (undated) DEVICE — BLADELESS OBTURATOR: Brand: WECK VISTA

## (undated) DEVICE — Device: Brand: DISPOSABLE ELECTROSURGICAL SNARE

## (undated) DEVICE — SINGLE-USE BIOPSY FORCEPS: Brand: RADIAL JAW 4

## (undated) DEVICE — BITEBLOCK ENDO W/STRAP 60F A/ LF DISP

## (undated) DEVICE — PATIENT RETURN ELECTRODE, SINGLE-USE, CONTACT QUALITY MONITORING, ADULT, WITH 9FT CORD, FOR PATIENTS WEIGING OVER 33LBS. (15KG): Brand: MEGADYNE

## (undated) DEVICE — ADHS LIQ MASTISOL 2/3ML

## (undated) DEVICE — CANN SMPL SOFTECH BIFLO ETCO2 A/M 7FT

## (undated) DEVICE — STERILE POLYISOPRENE POWDER-FREE SURGICAL GLOVES: Brand: PROTEXIS

## (undated) DEVICE — ENDOPATH XCEL BLADELESS TROCARS WITH STABILITY SLEEVES: Brand: ENDOPATH XCEL

## (undated) DEVICE — DBD-DRAPE,LAP,CHOLE,W/TROUGHS,STERILE: Brand: MEDLINE

## (undated) DEVICE — ARM DRAPE

## (undated) DEVICE — STERILE POLYISOPRENE POWDER-FREE SURGICAL GLOVES WITH EMOLLIENT COATING: Brand: PROTEXIS

## (undated) DEVICE — COLUMN DRAPE

## (undated) DEVICE — LAPAROVUE VISIBILITY SYSTEM LAPAROSCOPIC SOLUTIONS: Brand: LAPAROVUE

## (undated) DEVICE — TIP COVER ACCESSORY

## (undated) DEVICE — SUT MONOCRYL 4/0 PS2 27IN Y426H ETY426H

## (undated) DEVICE — MSK ENDO PORT O2 POM ELITE CURAPLEX A/

## (undated) DEVICE — 3M™ STERI-STRIP™ REINFORCED ADHESIVE SKIN CLOSURES, R1548, 1 IN X 5 IN (25 MM X 125 MM), 4 STRIPS/ENVELOPE: Brand: 3M™ STERI-STRIP™

## (undated) DEVICE — CANNULA SEAL

## (undated) DEVICE — SUT VIC 0/0 UR6 27IN DYED J603H

## (undated) DEVICE — TRAP SXN POLYP QUICKCATCH LF